# Patient Record
Sex: MALE | Race: WHITE | NOT HISPANIC OR LATINO | Employment: OTHER | URBAN - METROPOLITAN AREA
[De-identification: names, ages, dates, MRNs, and addresses within clinical notes are randomized per-mention and may not be internally consistent; named-entity substitution may affect disease eponyms.]

---

## 2017-01-30 ENCOUNTER — TELEPHONE (OUTPATIENT)
Dept: NEUROLOGY | Facility: CLINIC | Age: 72
End: 2017-01-30

## 2017-02-10 ENCOUNTER — OFFICE VISIT (OUTPATIENT)
Dept: NEUROLOGY | Facility: CLINIC | Age: 72
End: 2017-02-10

## 2017-02-10 VITALS
HEART RATE: 83 BPM | DIASTOLIC BLOOD PRESSURE: 70 MMHG | SYSTOLIC BLOOD PRESSURE: 138 MMHG | OXYGEN SATURATION: 97 % | HEIGHT: 74 IN | BODY MASS INDEX: 27.21 KG/M2 | WEIGHT: 212 LBS

## 2017-02-10 DIAGNOSIS — G44.301 INTRACTABLE POST-TRAUMATIC HEADACHE, UNSPECIFIED CHRONICITY PATTERN: ICD-10-CM

## 2017-02-10 DIAGNOSIS — G40.219 PARTIAL SYMPTOMATIC EPILEPSY WITH COMPLEX PARTIAL SEIZURES, INTRACTABLE, WITHOUT STATUS EPILEPTICUS (HCC): ICD-10-CM

## 2017-02-10 PROBLEM — G44.309 POST-TRAUMATIC HEADACHE: Status: ACTIVE | Noted: 2017-02-10

## 2017-02-10 PROCEDURE — 99214 OFFICE O/P EST MOD 30 MIN: CPT | Performed by: PSYCHIATRY & NEUROLOGY

## 2017-02-10 RX ORDER — TOPIRAMATE 25 MG/1
25 CAPSULE, COATED PELLETS ORAL DAILY
COMMUNITY
End: 2017-06-16

## 2017-02-10 NOTE — PROGRESS NOTES
Subjective   Jason Arceo is a 71 y.o. male complex partial epilepsy came for follow-up appointment.    History of Present Illness he was accompanied by his wife and according to them, he did not have any seizures since last visit but complaining of worsening of episodes of smoking smell and having multiple episodes a day and complaint with the medication topiramate and denies any side effects. Patient had a fall twice and trips while walking and fell on the left side of the head with no loss of consciousness, jerking movements or seizure-like activity and since then patient complaining of headaches mainly on the left side of the head and pressure-like pain but not associated with nausea, photophobia or phonophobia and denies any blurred vision, double vision, weakness, tingling numbness, dizziness or balance problems when walking.  Denies any passing out spells or recent hospitalizations since last visit.  Denies any major depression or anxiety issues.    The following portions of the patient's history were reviewed and updated as appropriate: allergies, current medications, past family history, past medical history, past social history, past surgical history and problem list.    Review of Systems   Constitutional: Negative for chills, fatigue and fever.   HENT: Positive for hearing loss. Negative for tinnitus and trouble swallowing.    Eyes: Positive for pain and visual disturbance.   Respiratory: Negative for shortness of breath and wheezing.    Cardiovascular: Negative for chest pain and palpitations.   Gastrointestinal: Negative for constipation, diarrhea, nausea and vomiting.   Endocrine: Negative for cold intolerance and heat intolerance.   Genitourinary: Negative for decreased urine volume, difficulty urinating and urgency.   Musculoskeletal: Positive for gait problem (HAD 2 FALLS IN DEC.). Negative for back pain, neck pain and neck stiffness.   Skin: Negative for rash and wound.   Allergic/Immunologic:  Negative for environmental allergies and food allergies.   Neurological: Positive for headaches (PRESSURE). Negative for dizziness, weakness, light-headedness and numbness.   Hematological: Does not bruise/bleed easily.   Psychiatric/Behavioral: Negative for confusion, hallucinations and sleep disturbance.       Objective   Physical Exam   Vitals reviewed.    GENERAL EXAMINATION : Patient is well-developed, well-nourished, well-groomed, alert and approriate in no apparent distress.  HEENT: Normocephalic, normal funduscopic exam, no visible oral lesions.  NECK : Normal range of motion, no tenderness, no malalignment.  CARDIAC : Regular rate and rhythm, no murmurs.  CHEST : Clear to auscultation, bilateral.   No wheezing .  ABDOMEN : Soft, non tender and non distended. EXTREMITIES: No edema. SKIN : No rashes or lesions visible. MUSCULOSKELETAL : No muscle weakness or muscle pain. No joint pains. PSYCHIATRIC : Awake and follwoing verbal commands well.     NEUROLOGICAL EXAMINATION  :  Higher integrative functions : No aphasia or dysarthria.  CRANIAL NERVES : Cranial nerve II: Normal visual acuity and visual fields.  On funduscopic exam, discs are flat with sharp margins cranial nerves III, IV, VI: Extraocular movements are full without nystagmus; pupils are equal, round and reactive to light.  Cranial nerve V: Normal facial sensation and strength of muscles of mastication.  Cranial nerve: VII: Facial movements are symmetric.  No weakness.  Cranial nerve VIII: Auditory acuity is normal.  Cranial nerve IX, X: Symmetric palate movement.  Cranial nerve XI sternocleidomastoid and trapezius are normal.  Cranial nerve XII: Tongue in the midline with no atrophy or fasciculations.      MOTOR : Normal muscle strength and tone.  SENSATION : Normal to light touch, pinprick, vibration sensations intact and Romberg is negative.  MUSCLE STRETCH REFLEXES : Normal and symmetric in the upper extremities and lower extremities and the  plantar responses are flexor bilaterally. COORDINATION : Finger to nose test showed no dysmetria.  Rapid alternating movements are normal.   GAIT : Walks on heels, toes except for mild difficulty with tandem walk.      Assessment/Plan   Jason was seen today for seizures.    Diagnoses and all orders for this visit:    Partial symptomatic epilepsy with complex partial seizures, intractable, without status epilepticus  -     EEG (Hospital Performed); Future    Intractable post-traumatic headache, unspecified chronicity pattern  -     CT Head Without Contrast; Future    71-year-old right-handed white male with history of complex partial epilepsy and had a recent fall and complaining of headaches concerning for posttraumatic headache came for follow-up appointment.  On exam, no new focal deficits were seen except for mild difficulty with tandem walk.  Discussed with the patient and his wife regarding his signs and symptoms and ordered for CT head without contrast to evaluate for posttraumatic headaches.  Also ordered for 4 hour EEG to evaluate for these episodes of smoking smell.  Told him to continue topiramate 25 mg by mouth twice a day and discussed about side effects and will consider increasing the dose in future depending on EEG.  Discussed about occasional or use headaches and told him not to take over-the-counter medication as it can cause rebound headaches.  Discussed about seizure precautions and told him not to drive for 90 days after last seizure, no climbing ladders or cleaning gutters, no swimming while alone and no tub baths while alone.  Will follow-up in 4 months or earlier if problems arise.    Thank you for allowing me to participate in the care of the patient.  Please feel free to call for any questions at your convenience.    Yours sincerely,    Genevieve Joshi M.D

## 2017-03-02 ENCOUNTER — HOSPITAL ENCOUNTER (OUTPATIENT)
Dept: CT IMAGING | Facility: HOSPITAL | Age: 72
Discharge: HOME | End: 2017-03-02
Attending: PSYCHIATRY & NEUROLOGY

## 2017-03-02 ENCOUNTER — HOSPITAL ENCOUNTER (OUTPATIENT)
Dept: NEUROLOGY | Facility: HOSPITAL | Age: 72
Discharge: HOME | End: 2017-03-02
Attending: PSYCHIATRY & NEUROLOGY | Admitting: PSYCHIATRY & NEUROLOGY

## 2017-03-02 DIAGNOSIS — G40.219 PARTIAL SYMPTOMATIC EPILEPSY WITH COMPLEX PARTIAL SEIZURES, INTRACTABLE, WITHOUT STATUS EPILEPTICUS (HCC): ICD-10-CM

## 2017-03-02 DIAGNOSIS — G44.301 INTRACTABLE POST-TRAUMATIC HEADACHE, UNSPECIFIED CHRONICITY PATTERN: ICD-10-CM

## 2017-03-02 PROCEDURE — 95813 EEG EXTND MNTR 61-119 MIN: CPT | Performed by: PSYCHIATRY & NEUROLOGY

## 2017-03-02 PROCEDURE — 70450 CT HEAD/BRAIN W/O DYE: CPT

## 2017-03-02 PROCEDURE — 95813 EEG EXTND MNTR 61-119 MIN: CPT

## 2017-03-02 NOTE — PROCEDURES
DATE OF EVALUATION:  03/02/2017     TEST DURATION: 2 hours 43 minutes.     INDICATIONS: Epilepsy.     TECHNIQUE: This is a digitally recorded non-video monitored EEG study. The 10-20 placement electrode system was utilized for this test. Hyperventilation was not performed.     FINDINGS:   There was severe muscular artifact affecting all electrode locations at the onset of this recording. The resting heart rate is 70. With reduction of muscle artifact, there is electrode artifact prominent at T6. During drowsiness, there is a posterior dominant rhythm of about 6 Hz theta. With deeper stages of drowsiness, 14 Hz sleep spindles, vertex sharp waves and K-complexes are noted.  Within 30 minutes at the onset of the recording, the F7 electrode showed severe artifact. Muscular artifact continued. With further relaxation, the artifact resolved. Mostly sleep spindle activity was intermixed with theta as described earlier and these activities were also identified over the bilateral temporal head regions. During the prolonged drowsy/waking recording, focal slowing of the background was not noted.  Spikes and sharp waves were not seen. During wakefulness 8.5 to 9 Hz low amplitude rhythmic alpha could be seen intermittently over the bioccipital head regions. T6 artifact remained fairly constant throughout the recording. Muscle artifact also remained severe throughout this EEG.     INTERPRETATION: During drowsiness and light sleep, the background appeared normal.  During wakefulness, there was severe artifact, although the posterior rhythm appeared intact and within the normal limits.     EEG CODE: 41174      JEANINE Arreola:jeffrey  D:   03/02/2017 09:53:47  T:   03/02/2017 12:54:56  Job ID:   [id number]  Document ID:   [id number]  cc:

## 2017-03-07 ENCOUNTER — TELEPHONE (OUTPATIENT)
Dept: NEUROLOGY | Facility: CLINIC | Age: 72
End: 2017-03-07

## 2017-03-10 ENCOUNTER — TELEPHONE (OUTPATIENT)
Dept: NEUROLOGY | Facility: CLINIC | Age: 72
End: 2017-03-10

## 2017-03-13 ENCOUNTER — TELEPHONE (OUTPATIENT)
Dept: NEUROLOGY | Facility: CLINIC | Age: 72
End: 2017-03-13

## 2017-06-16 ENCOUNTER — OFFICE VISIT (OUTPATIENT)
Dept: NEUROLOGY | Facility: CLINIC | Age: 72
End: 2017-06-16

## 2017-06-16 VITALS
WEIGHT: 212 LBS | BODY MASS INDEX: 27.21 KG/M2 | OXYGEN SATURATION: 97 % | SYSTOLIC BLOOD PRESSURE: 134 MMHG | DIASTOLIC BLOOD PRESSURE: 72 MMHG | HEIGHT: 74 IN | HEART RATE: 62 BPM

## 2017-06-16 DIAGNOSIS — G40.209 PARTIAL SYMPTOMATIC EPILEPSY WITH COMPLEX PARTIAL SEIZURES, NOT INTRACTABLE, WITHOUT STATUS EPILEPTICUS (HCC): ICD-10-CM

## 2017-06-16 PROBLEM — G44.309 POST-TRAUMATIC HEADACHE: Status: RESOLVED | Noted: 2017-02-10 | Resolved: 2017-06-16

## 2017-06-16 PROCEDURE — 99212 OFFICE O/P EST SF 10 MIN: CPT | Performed by: PSYCHIATRY & NEUROLOGY

## 2017-06-16 RX ORDER — TOPIRAMATE 25 MG/1
TABLET ORAL
COMMUNITY
Start: 2017-05-04 | End: 2017-06-16 | Stop reason: SDUPTHER

## 2017-06-16 RX ORDER — POTASSIUM CHLORIDE 750 MG/1
TABLET, FILM COATED, EXTENDED RELEASE ORAL
COMMUNITY
Start: 2017-05-04 | End: 2018-12-18 | Stop reason: SDUPTHER

## 2017-06-16 RX ORDER — TOPIRAMATE 25 MG/1
25 TABLET ORAL 2 TIMES DAILY
Qty: 180 TABLET | Refills: 3 | Status: SHIPPED | OUTPATIENT
Start: 2017-06-16 | End: 2020-12-31

## 2017-06-16 RX ORDER — AZITHROMYCIN 250 MG/1
TABLET, FILM COATED ORAL
Refills: 0 | COMMUNITY
Start: 2017-05-18 | End: 2017-12-18

## 2017-06-16 NOTE — PROGRESS NOTES
Subjective   Jason Arceo is a 72 y.o. male with history of complex partial epilepsy and posttraumatic headaches came for follow-up appointment.    History of Present Illness   He was accompanied by his wife and according to them, he denies any seizures since last visit and denies any abnormal smell and complaint with the medication topiramate and denies any side effects.  Denies any headaches since the last visit and denies any blurred vision, double vision, weakness, tingling numbness, dizziness or balance problems when walking.  Denies any falls, passing out spells or recent hospitalizations since last visit except for hospitalized for pneumonia and had rib fractures.  Denies any major depression or anxiety issues.    The following portions of the patient's history were reviewed and updated as appropriate: allergies, current medications, past family history, past medical history, past social history, past surgical history and problem list.    Review of Systems   Constitutional: Positive for fatigue. Negative for chills and fever.   HENT: Negative for hearing loss, tinnitus and trouble swallowing.    Eyes: Negative for pain, redness and itching.   Respiratory: Negative for cough, shortness of breath and wheezing.    Cardiovascular: Negative for chest pain, palpitations and leg swelling.   Gastrointestinal: Negative for constipation, diarrhea, nausea and vomiting.   Endocrine: Negative for cold intolerance and heat intolerance.   Genitourinary: Negative for decreased urine volume, difficulty urinating, frequency and urgency.   Musculoskeletal: Negative for back pain, gait problem, neck pain and neck stiffness.   Skin: Negative for color change, rash and wound.   Allergic/Immunologic: Negative for environmental allergies and food allergies.   Neurological: Negative for dizziness, tremors, seizures, syncope, weakness, light-headedness, numbness and headaches.   Hematological: Negative for adenopathy. Does not  bruise/bleed easily.   Psychiatric/Behavioral: Negative for confusion, hallucinations and sleep disturbance. The patient is not nervous/anxious.        Objective   Physical Exam   Vitals reviewed.    GENERAL EXAMINATION : Patient is well-developed, well-nourished, well-groomed, alert and approriate in no apparent distress.  HEENT: Normocephalic, normal funduscopic exam, no visible oral lesions.  NECK : Normal range of motion, no tenderness, no malalignment.  CARDIAC : Regular rate and rhythm, no murmurs.  CHEST : Clear to auscultation, bilateral.   No wheezing .  ABDOMEN : Soft, non tender and non distended. EXTREMITIES: No edema. SKIN : No rashes or lesions visible. MUSCULOSKELETAL : No muscle weakness or muscle pain. No joint pains. PSYCHIATRIC : Awake and follwoing verbal commands well.     NEUROLOGICAL EXAMINATION  :  Higher integrative functions : No aphasia or dysarthria.  CRANIAL NERVES : Cranial nerve II: Normal visual acuity and visual fields.  On funduscopic exam, discs are flat with sharp margins cranial nerves III, IV, VI: Extraocular movements are full without nystagmus; pupils are equal, round and reactive to light.  Cranial nerve V: Normal facial sensation and strength of muscles of mastication.  Cranial nerve: VII: Facial movements are symmetric.  No weakness.  Cranial nerve VIII: Auditory acuity is normal.  Cranial nerve IX, X: Symmetric palate movement.  Cranial nerve XI sternocleidomastoid and trapezius are normal.  Cranial nerve XII: Tongue in the midline with no atrophy or fasciculations.      MOTOR : Normal muscle strength and tone.  SENSATION : Normal to light touch, pinprick, vibration sensations intact and Romberg is negative.  MUSCLE STRETCH REFLEXES : Normal and symmetric in the upper extremities and lower extremities and the plantar responses are flexor bilaterally. COORDINATION : Finger to nose test showed no dysmetria.  Rapid alternating movements are normal.   GAIT : Walks on heels,  toes, except for mild difficulty with tandem walk.    Assessment/Plan   Jason was seen today for seizures.    Diagnoses and all orders for this visit:    Partial symptomatic epilepsy with complex partial seizures, not intractable, without status epilepticus    Other orders  -     topiramate (TOPAMAX) 25 MG tablet; Take 1 tablet by mouth 2 (Two) Times a Day for 90 days.    72-year-old right-handed white male with history of complex partial epilepsy with posttraumatic headaches came for follow-up appointment.  On exam, no new focal deficits were seen except for mild difficulty with tandem walk.  Reviewed hospital records and CT head dated March 2017 and showed atrophy with encephalomalacia in the right frontal lobe and did not any acute abnormality and also reviewed 4 hour EEG and did not show any epileptic discharges and discussed with the patient and his wife regarding his signs and symptoms and told him to continue topiramate 25 mg by mouth twice a day and discussed about side effects.  Discussed about seizure precautions and told him not to drive for 90 days after last seizure, no climbing ladders or cleaning gutters, no swimming while alone and not a baths while alone.  Will follow-up in 6 months or earlier if problems arise.    Thank you for allowing me to participate in the care of the patient.  Please feel free to call for any questions at your convenience.    Yours sincerely,    Genevieve Joshi M.D

## 2017-12-18 ENCOUNTER — OFFICE VISIT (OUTPATIENT)
Dept: NEUROLOGY | Facility: CLINIC | Age: 72
End: 2017-12-18

## 2017-12-18 VITALS
HEIGHT: 74 IN | BODY MASS INDEX: 28.75 KG/M2 | SYSTOLIC BLOOD PRESSURE: 122 MMHG | WEIGHT: 224 LBS | HEART RATE: 60 BPM | OXYGEN SATURATION: 96 % | DIASTOLIC BLOOD PRESSURE: 68 MMHG

## 2017-12-18 DIAGNOSIS — G40.209 PARTIAL SYMPTOMATIC EPILEPSY WITH COMPLEX PARTIAL SEIZURES, NOT INTRACTABLE, WITHOUT STATUS EPILEPTICUS (HCC): Primary | ICD-10-CM

## 2017-12-18 DIAGNOSIS — G56.03 BILATERAL CARPAL TUNNEL SYNDROME: ICD-10-CM

## 2017-12-18 DIAGNOSIS — S06.9X5D CLOSED TRAUMATIC BRAIN INJURY, WITH LOSS OF CONSCIOUSNESS GREATER THAN 24 HOURS WITH RETURN TO PRE-EXISTING CONSCIOUS LEVEL, SUBSEQUENT ENCOUNTER: ICD-10-CM

## 2017-12-18 PROBLEM — S06.9XAA TRAUMATIC BRAIN INJURY, CLOSED: Status: ACTIVE | Noted: 2017-12-18

## 2017-12-18 PROCEDURE — 99214 OFFICE O/P EST MOD 30 MIN: CPT | Performed by: PSYCHIATRY & NEUROLOGY

## 2017-12-18 RX ORDER — TOPIRAMATE 25 MG/1
TABLET ORAL
COMMUNITY
Start: 2017-11-04 | End: 2021-01-01 | Stop reason: HOSPADM

## 2017-12-18 RX ORDER — PANTOPRAZOLE SODIUM 40 MG/1
TABLET, DELAYED RELEASE ORAL
COMMUNITY
Start: 2017-10-21 | End: 2021-01-01 | Stop reason: HOSPADM

## 2017-12-18 RX ORDER — LOSARTAN POTASSIUM 50 MG/1
TABLET ORAL
COMMUNITY
Start: 2017-10-21 | End: 2021-01-01 | Stop reason: HOSPADM

## 2017-12-18 RX ORDER — POTASSIUM CHLORIDE 750 MG/1
TABLET, EXTENDED RELEASE ORAL
COMMUNITY
Start: 2017-10-21 | End: 2017-12-18 | Stop reason: SDUPTHER

## 2017-12-18 RX ORDER — PREDNISONE 1 MG/1
TABLET ORAL
COMMUNITY
Start: 2017-10-26 | End: 2017-12-18

## 2017-12-18 RX ORDER — AMOXICILLIN 500 MG/1
CAPSULE ORAL
COMMUNITY
Start: 2017-10-13 | End: 2017-12-18

## 2017-12-18 RX ORDER — ROSUVASTATIN CALCIUM 5 MG/1
TABLET, COATED ORAL
Status: ON HOLD | COMMUNITY
Start: 2017-11-16 | End: 2020-12-26

## 2017-12-18 NOTE — PROGRESS NOTES
Subjective:     Patient ID: Jason Arceo is a 72 y.o. male.    History of Present Illness  The following portions of the patient's history were reviewed and updated as appropriate: allergies, current medications, past family history, past medical history, past social history, past surgical history and problem list.    [facility]- May 2010. At the shop, fell 8  Feet to concrete floor, struck right side of head.He was unconscious x 11 days, (blood clots in legs, then 59 d of rehab at [facility]. Couldn't walk or feed himself.  He returned to normal about a year. Deficits since then- ? Prior to injury hea  Added lists of numbers in his head easily, but this is no longer possible. Still farms, works on a tractor, and has no complaint of imbalance or motor deficit.       One seizure in  October 2010.  Also under a lot of stress at that time. Went to . Was previously on Vimpat but had allergy, and and also allergic to keppra.  SO--  On tpmx- 25 bid- seems to ameliorate all seizures and headache  No real headaches at this time.     Drives tractor, car.     EEG- 2017 normal    CT 2/10/17- old encephalomalacia, RF.    Other hx- DM oral agents; HTN- off and on x 25 yrs;  AMI x2 with CABG in 2006; hip replacement.     New diagnosis today-typical symptoms of carpal tunnel syndrome with bilateral numbness and tingling.  Has to shake his hands at night.  Bothers him while using the tractor.  Review of Systems   Constitutional: Negative for activity change, appetite change and fatigue.   HENT: Negative for ear pain, facial swelling and trouble swallowing.    Eyes: Positive for visual disturbance. Negative for photophobia and pain.   Respiratory: Negative for choking, chest tightness and shortness of breath.    Cardiovascular: Positive for leg swelling. Negative for chest pain and palpitations.   Gastrointestinal: Negative for abdominal pain, constipation and nausea.   Endocrine: Negative for polydipsia, polyphagia and polyuria.    Genitourinary: Negative for difficulty urinating, frequency and urgency.   Musculoskeletal: Positive for neck pain. Negative for back pain and gait problem.   Skin: Negative for color change, rash and wound.   Allergic/Immunologic: Negative for environmental allergies, food allergies and immunocompromised state.   Neurological: Negative for dizziness, tremors, seizures, syncope, facial asymmetry, speech difficulty, weakness, light-headedness, numbness and headaches.   Hematological: Negative for adenopathy. Bruises/bleeds easily.   Psychiatric/Behavioral: Negative for agitation, behavioral problems, confusion, decreased concentration, dysphoric mood, hallucinations, self-injury, sleep disturbance and suicidal ideas. The patient is not nervous/anxious and is not hyperactive.         Objective:    Neurologic Exam  This patient was well-developed, well-nourished and in no acute distress.      GAIT:  Gait, station, heel, toe and tandem walk were normal.  There was no drift of the arms.  Romberg’s sign was not present.      VASCULAR: The carotid arteries had normal upstroke to palpation without bruits.     MENTAL STATUS: This patient was alert and oriented to person, place, time and situation. Fund of knowledge was intact.    CRANIAL NERVES:  Olfaction-not tested.  Visual fields full in all quadrants to confrontation.  The pupils were equally round and reactive to light at 4 mm.  There was no evidence of a Braxton Parvez pupil.  Funduscopic exam revealed no papilledema, hemorrhage or exudate.  The gaze was conjugate. The vertical and horizontal eye movements were full without nystagmus.  There was no facial weakness.  There was no facial sensory loss to pinprick bilaterally.  Hearing was normal for light finger rub and casual speech.  Speech is normal without dysarthria.  The neck is supple and strength is normal in rotation, flexion and extension.  Tongue and palate movements are normal.    MOTOR UPPER EXTREMTIES:    Bilaterally the deltoid, biceps, triceps, wrist extensorsgrade 5 and normal.  Bulk, tone and strength of these muscles were normal without abnormal movements.    Bilaterally the APB strength was grade 4 worse on the right than the left with bilateral atrophy also worse on the right.    MOTOR LOWER EXTREMITIES: Bilaterally the hip flexors, hip abductors, knee flexors and extensors, ankle plantar flexors and dorsiflexors were grade 5 with normal. Bulk, tone and strength of these muscles were normal without abnormal movements    DEEP TENDON REFLEXES:  Bilateral biceps, triceps, and brachioradialis reflexes were trace symmetric.  Bilateral knee, hamstrings and ankle reflexes were trace and symmetric.   Ankle clonus was not present.    CEREBELLAR:  Finger to nose, rapid finger opposition, and gait were performed normally, bilaterally.    MUSCULOSKELETAL:  Normal range of motion of the neck is noted.  There was no back pain with straight leg raise.  l.     SENSORY: There was normal upper and lower extremity sensation to vibration, and pinprick with the following exception.  Markedly decreased sensation to pinprick over the volar aspect of the index and long fingers down to the level of the MP joint.  Normal pinprick sensation in ulnar and radial distribution      Speech-slight hesitance but intact and fluent  Physical Exam   Constitutional: He appears well-developed and well-nourished.   Neck: Normal range of motion. Neck supple.   Cardiovascular: Normal rate.    Pulmonary/Chest: Effort normal.   Musculoskeletal:   Bilateral thenar atrophy.  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   Nursing note and vitals reviewed.      Assessment/Plan:       Problems Addressed this Visit        Unprioritized    Complex partial epilepsy - Primary    Relevant Medications    topiramate (TOPAMAX) 25 MG tablet    Traumatic brain injury, closed    Bilateral carpal tunnel syndrome    Relevant  Orders    EMG & Nerve Conduction Test           Brain injury by CT- frontal.  This is stable.    Seizure disorder-a single seizure.  None since then.  Stable on very low-dose Topamax.  No renal stones and no adverse effects on this drug.  Weight is stable.  Renal stone risk  discussed      Bilateral carpal tunnel syndrome.  This is severe.  Worse on the right.  I will refer for EMG nerve conduction studies.  Diabetes is a mitigating factor as is his work

## 2017-12-28 ENCOUNTER — HOSPITAL ENCOUNTER (OUTPATIENT)
Dept: INFUSION THERAPY | Facility: HOSPITAL | Age: 72
Discharge: HOME | End: 2017-12-28
Attending: PSYCHIATRY & NEUROLOGY | Admitting: PSYCHIATRY & NEUROLOGY

## 2017-12-28 DIAGNOSIS — G56.03 BILATERAL CARPAL TUNNEL SYNDROME: ICD-10-CM

## 2017-12-28 PROCEDURE — 95886 MUSC TEST DONE W/N TEST COMP: CPT

## 2017-12-28 PROCEDURE — 95911 NRV CNDJ TEST 9-10 STUDIES: CPT | Performed by: PSYCHIATRY & NEUROLOGY

## 2017-12-28 PROCEDURE — 95886 MUSC TEST DONE W/N TEST COMP: CPT | Performed by: PSYCHIATRY & NEUROLOGY

## 2017-12-28 PROCEDURE — 95911 NRV CNDJ TEST 9-10 STUDIES: CPT

## 2017-12-28 PROCEDURE — 95910 NRV CNDJ TEST 7-8 STUDIES: CPT

## 2018-09-04 ENCOUNTER — AMBULATORY SURGICAL CENTER (AMBULATORY)
Dept: URBAN - METROPOLITAN AREA SURGERY 17 | Facility: SURGERY | Age: 73
End: 2018-09-04

## 2018-09-04 VITALS
SYSTOLIC BLOOD PRESSURE: 90 MMHG | SYSTOLIC BLOOD PRESSURE: 123 MMHG | WEIGHT: 195 LBS | HEART RATE: 49 BPM | DIASTOLIC BLOOD PRESSURE: 69 MMHG | SYSTOLIC BLOOD PRESSURE: 95 MMHG | DIASTOLIC BLOOD PRESSURE: 43 MMHG | RESPIRATION RATE: 26 BRPM | OXYGEN SATURATION: 88 % | RESPIRATION RATE: 17 BRPM | DIASTOLIC BLOOD PRESSURE: 70 MMHG | SYSTOLIC BLOOD PRESSURE: 134 MMHG | DIASTOLIC BLOOD PRESSURE: 62 MMHG | RESPIRATION RATE: 25 BRPM | SYSTOLIC BLOOD PRESSURE: 83 MMHG | HEART RATE: 58 BPM | DIASTOLIC BLOOD PRESSURE: 42 MMHG | SYSTOLIC BLOOD PRESSURE: 85 MMHG | HEART RATE: 57 BPM | SYSTOLIC BLOOD PRESSURE: 74 MMHG | HEART RATE: 50 BPM | DIASTOLIC BLOOD PRESSURE: 66 MMHG | OXYGEN SATURATION: 100 % | SYSTOLIC BLOOD PRESSURE: 118 MMHG | DIASTOLIC BLOOD PRESSURE: 56 MMHG | DIASTOLIC BLOOD PRESSURE: 54 MMHG | TEMPERATURE: 97 F | SYSTOLIC BLOOD PRESSURE: 73 MMHG | DIASTOLIC BLOOD PRESSURE: 38 MMHG | DIASTOLIC BLOOD PRESSURE: 55 MMHG | HEART RATE: 55 BPM | OXYGEN SATURATION: 96 % | RESPIRATION RATE: 18 BRPM | RESPIRATION RATE: 20 BRPM | DIASTOLIC BLOOD PRESSURE: 61 MMHG | DIASTOLIC BLOOD PRESSURE: 60 MMHG | DIASTOLIC BLOOD PRESSURE: 35 MMHG | OXYGEN SATURATION: 98 % | SYSTOLIC BLOOD PRESSURE: 128 MMHG | HEIGHT: 75 IN | HEART RATE: 51 BPM | HEART RATE: 54 BPM | TEMPERATURE: 96.8 F | OXYGEN SATURATION: 99 % | RESPIRATION RATE: 19 BRPM | SYSTOLIC BLOOD PRESSURE: 80 MMHG | HEART RATE: 53 BPM | SYSTOLIC BLOOD PRESSURE: 94 MMHG | HEART RATE: 45 BPM | DIASTOLIC BLOOD PRESSURE: 63 MMHG | RESPIRATION RATE: 16 BRPM | HEART RATE: 52 BPM

## 2018-09-04 DIAGNOSIS — K57.30 DIVERTICULOSIS OF LARGE INTESTINE WITHOUT PERFORATION OR ABS: ICD-10-CM

## 2018-09-04 DIAGNOSIS — K64.8 OTHER HEMORRHOIDS: ICD-10-CM

## 2018-09-04 DIAGNOSIS — Z86.010 PERSONAL HISTORY OF COLONIC POLYPS: ICD-10-CM

## 2018-09-04 PROCEDURE — G0105 COLORECTAL SCRN; HI RISK IND: HCPCS

## 2018-12-18 ENCOUNTER — OFFICE VISIT (OUTPATIENT)
Dept: NEUROLOGY | Facility: CLINIC | Age: 73
End: 2018-12-18

## 2018-12-18 VITALS
WEIGHT: 206 LBS | HEART RATE: 60 BPM | OXYGEN SATURATION: 98 % | BODY MASS INDEX: 26.44 KG/M2 | SYSTOLIC BLOOD PRESSURE: 152 MMHG | HEIGHT: 74 IN | DIASTOLIC BLOOD PRESSURE: 70 MMHG

## 2018-12-18 DIAGNOSIS — G40.209 PARTIAL SYMPTOMATIC EPILEPSY WITH COMPLEX PARTIAL SEIZURES, NOT INTRACTABLE, WITHOUT STATUS EPILEPTICUS (HCC): Primary | ICD-10-CM

## 2018-12-18 DIAGNOSIS — M65.311 TRIGGER THUMB OF RIGHT HAND: ICD-10-CM

## 2018-12-18 DIAGNOSIS — G56.03 BILATERAL CARPAL TUNNEL SYNDROME: ICD-10-CM

## 2018-12-18 DIAGNOSIS — M19.049 CMC ARTHRITIS: ICD-10-CM

## 2018-12-18 DIAGNOSIS — R43.9 DYSOSMIA: ICD-10-CM

## 2018-12-18 PROCEDURE — 99214 OFFICE O/P EST MOD 30 MIN: CPT | Performed by: PSYCHIATRY & NEUROLOGY

## 2018-12-18 RX ORDER — POTASSIUM CHLORIDE 750 MG/1
10 TABLET, EXTENDED RELEASE ORAL EVERY MORNING
COMMUNITY

## 2018-12-18 RX ORDER — DEXLANSOPRAZOLE 60 MG/1
60 CAPSULE, DELAYED RELEASE ORAL DAILY
COMMUNITY
End: 2021-10-11

## 2018-12-18 RX ORDER — SENNA PLUS 8.6 MG/1
TABLET ORAL
Status: ON HOLD | COMMUNITY
End: 2020-12-26

## 2018-12-18 RX ORDER — AMOXICILLIN 500 MG/1
CAPSULE ORAL
Refills: 0 | COMMUNITY
Start: 2018-11-26 | End: 2021-06-13

## 2018-12-18 RX ORDER — FOLIC ACID 1 MG/1
1000 TABLET ORAL DAILY
Refills: 11 | COMMUNITY
Start: 2018-10-22

## 2018-12-18 NOTE — PROGRESS NOTES
Subjective:     Patient ID: Jason Arceo is a 73 y.o. male.    History of Present Illness  The following portions of the patient's history were reviewed and updated as appropriate: allergies, current medications, past family history, past medical history, past social history, past surgical history and problem list.    SEIZURE DISORDER-     [facility]- May 2010. At the shop, fell 8  Feet to concrete floor, struck right side of head.He was unconscious x 11 days, (blood clots in legs, then 59 d of rehab at [facility]. Couldn't walk or feed himself.  He returned to normal about a year LATER.     One seizure in  October 2010.  Also under a lot of stress at that time. Went to . Was previously on Vimpat but had allergy, and and also allergic to keppra. Now on low-dose topiramate.    On tpmx- 25 bid- seems to ameliorate all seizures and headache  No real headaches at this time.          Deficits :Added lists of numbers in his head easily, but this is no longer possible. Still farms, works on a tractor, and has no complaint of imbalance or motor deficit. 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 Driving w/o difficulty     LAB REVIEW-      EEG- 2017 normal     CT 2/10/17- old encephalomalacia, RF.     Other hx- DM oral agents; HTN- off and on x 25 yrs;  AMI x2 with CABG in 2006; hip replacement.     He has new sx of dysosmia- for the last month gets a' smell of tobacco' and this smells 'bad, but all other odors are fine. This is almost daily occurrence. ]No seizures.] It lasts hours. Unlikely to be a seizure due to daily nature, lasting hours.  He has had some bronchitis sx, spitting up dark color mucus f same duration as the dysosmia , about 3 weeks.      Carpal tunnel syndrome:  will perform surgery on the right hand which was successful.  Patient notes some trigger finger problems involving the right thumb and some arthritis at the base of the thumb.  .  Review of Systems   Constitutional: Positive for appetite change.  Negative for activity change and fatigue.   HENT: Positive for trouble swallowing. Negative for ear pain and facial swelling.    Eyes: Negative for photophobia, pain and visual disturbance.   Respiratory: Negative for choking, chest tightness and shortness of breath.    Cardiovascular: Negative for chest pain, palpitations and leg swelling.   Gastrointestinal: Positive for abdominal pain, constipation and nausea.   Endocrine: Negative for polydipsia, polyphagia and polyuria.   Genitourinary: Negative for difficulty urinating, frequency and urgency.   Musculoskeletal: Positive for back pain. Negative for gait problem and neck pain.   Skin: Negative for color change, rash and wound.   Allergic/Immunologic: Negative for environmental allergies, food allergies and immunocompromised state.   Neurological: Positive for headaches (feels like he has pressure just nehind his temples on both side). Negative for dizziness, tremors, seizures, syncope, facial asymmetry, speech difficulty, weakness, light-headedness and numbness.   Hematological: Negative for adenopathy. Does not bruise/bleed easily.   Psychiatric/Behavioral: Positive for confusion and decreased concentration. Negative for agitation, behavioral problems, dysphoric mood, hallucinations, self-injury, sleep disturbance and suicidal ideas. The patient is not nervous/anxious and is not hyperactive.         Objective:    Neurologic Exam     Mental Status   Oriented to person, place, and time.   Attention: normal. Concentration: normal.   Speech: speech is normal   Level of consciousness: alert  Knowledge: good. Unable to perform simple calculations.   Able to name object. Able to read.     Cranial Nerves     CN II   Visual fields full to confrontation.     CN III, IV, VI   Pupils are equal, round, and reactive to light.  Extraocular motions are normal.   Right pupil: Size: 4 mm.   Left pupil: Size: 4 mm.     CN V   Facial sensation intact.     CN VII   Facial  expression full, symmetric.     CN VIII   Hearing: intact    CN XI   CN XI normal.     CN XII   CN XII normal.   Normal fundi     Motor Exam Grade 5 strength throughout.  Intrinsic hand muscles graded 5.  However APB is atrophied right worse than left with about grade 4 strength on the right.  No fasciculations.     Sensory Exam   Light touch normal.     Gait, Coordination, and Reflexes     Gait  Gait: normal    Tremor   Resting tremor: absent  Intention tremor: absent  Action tremor: absent    Reflexes   Right triceps: 1+  Left triceps: 1+With the eyes closed he sways slightly but does not lose his balance.       Physical Exam   Constitutional: He is oriented to person, place, and time. He appears well-developed and well-nourished.   Eyes: EOM are normal. Pupils are equal, round, and reactive to light.   Neck: Normal range of motion. Neck supple.   Cardiovascular: Normal rate.   Pulmonary/Chest: Effort normal.   Musculoskeletal:   Arthritic deformities of the PIP and DIP joints bilaterally in the hands.  Finkelstein's maneuver negative on the right.  CMC grind test positive on the right.  Mild catching of the right thumb consistent with trigger thumb.   Neurological: He is oriented to person, place, and time. Gait normal.   Reflex Scores:       Tricep reflexes are 1+ on the right side and 1+ on the left side.  Psychiatric: He has a normal mood and affect. His speech is normal.   Nursing note and vitals reviewed.      Assessment/Plan:       Problems Addressed this Visit        Unprioritized    Complex partial epilepsy (CMS/HCC) - Primary    Bilateral carpal tunnel syndrome    Dysosmia    CMC arthritis    Trigger thumb of right hand           epilepsy is stable and he can remain on low-dose topiramate indefinitely.  Due to his abnormal brain anatomy with injury I would recommend that he remain on low-dose seizure medicine.      His hand problems are going to be readdressed by Dr. Cardoso.  He may need a steroid  injection for a diagnosis of trigger thumb and 4 a diagnosis of CMC arthritis.    Dysosmia is likely due to a recent upper respiratory infection, possibly sinusitis or bronchitis.  He will discuss this with his PCP.  I showed him an anatomical drawing of the olfactory nerve in the mucosa and noted that this is how infections can cause dysosmia    Follow-up will be when necessary

## 2019-02-11 ENCOUNTER — HOSPITAL ENCOUNTER (OUTPATIENT)
Dept: OTHER | Facility: HOSPITAL | Age: 74
Discharge: HOME | End: 2019-02-11
Attending: FAMILY MEDICINE

## 2019-07-30 ENCOUNTER — HOSPITAL ENCOUNTER (OUTPATIENT)
Dept: OTHER | Facility: HOSPITAL | Age: 74
Discharge: HOME | End: 2019-07-30
Attending: FAMILY MEDICINE

## 2019-10-27 VITALS
SYSTOLIC BLOOD PRESSURE: 112 MMHG | HEART RATE: 64 BPM | WEIGHT: 211 LBS | DIASTOLIC BLOOD PRESSURE: 62 MMHG | HEIGHT: 75 IN

## 2019-10-27 PROBLEM — Z86.010 PERSONAL HISTORY OF COLONIC POLYPS: Status: ACTIVE | Noted: 2018-09-04

## 2019-10-27 PROBLEM — K57.30 DVRTCLOS OF LG INT W/O PERFORATION OR ABSCESS W/O BLEEDING: Status: ACTIVE | Noted: 2018-09-04

## 2019-11-06 ENCOUNTER — OFFICE (AMBULATORY)
Dept: URBAN - METROPOLITAN AREA CLINIC 75 | Facility: CLINIC | Age: 74
End: 2019-11-06

## 2019-11-06 DIAGNOSIS — K64.8 OTHER HEMORRHOIDS: ICD-10-CM

## 2019-11-06 DIAGNOSIS — Z86.010 PERSONAL HISTORY OF COLONIC POLYPS: ICD-10-CM

## 2019-11-06 DIAGNOSIS — K57.30 DIVERTICULOSIS OF LARGE INTESTINE WITHOUT PERFORATION OR ABS: ICD-10-CM

## 2019-11-06 PROCEDURE — 99213 OFFICE O/P EST LOW 20 MIN: CPT

## 2019-11-25 ENCOUNTER — TELEPHONE (OUTPATIENT)
Dept: NEUROLOGY | Facility: CLINIC | Age: 74
End: 2019-11-25

## 2019-11-25 NOTE — TELEPHONE ENCOUNTER
I called and spoke to Mrs. Arceo and she stated they will call back when he decides what he is going to do.    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 Wife is wanting to know if patient needs to make an appointment for paperwork to be signed.

## 2019-12-05 ENCOUNTER — OFFICE (AMBULATORY)
Dept: URBAN - METROPOLITAN AREA CLINIC 75 | Facility: CLINIC | Age: 74
End: 2019-12-05

## 2019-12-05 VITALS — HEIGHT: 75 IN

## 2019-12-05 DIAGNOSIS — K64.1 SECOND DEGREE HEMORRHOIDS: ICD-10-CM

## 2019-12-05 PROBLEM — K62.5 RECTAL BLEEDING: Status: ACTIVE | Noted: 2019-12-05

## 2019-12-05 PROCEDURE — 46221 LIGATION OF HEMORRHOID(S): CPT | Performed by: INTERNAL MEDICINE

## 2019-12-05 NOTE — SERVICEHPINOTES
The patient presents with symptomatic grade   2  internal hemorrhoids , unresponsive to maximal medical therapy, requesting rubber band ligation of   his   hemorrhoidal disease. Xarelto has been held by his cardiologist due to significant rectal bleeding. He is on aspirin. Blood drips into the toilet. He was planned to have flex sig with banding by Dr. Ramirez but due to an emergency this needed to be rescheduled and he presents here for in office hemorrhoid banding today.We discussed that we would start the banding series today, and he could follow up with Dr. Ramirez. If bleeding persists, I would advise he go ahead with flex sig to ensure no lesions more proximal (polyp, mass, cancer, proctitis, diverticulosis) as etiology of bleeding and he and his wife agree.

## 2019-12-19 ENCOUNTER — OFFICE (AMBULATORY)
Dept: URBAN - METROPOLITAN AREA CLINIC 75 | Facility: CLINIC | Age: 74
End: 2019-12-19

## 2019-12-19 VITALS — HEIGHT: 75 IN

## 2019-12-19 DIAGNOSIS — K62.5 HEMORRHAGE OF ANUS AND RECTUM: ICD-10-CM

## 2019-12-19 DIAGNOSIS — K64.8 OTHER HEMORRHOIDS: ICD-10-CM

## 2019-12-19 DIAGNOSIS — K64.1 SECOND DEGREE HEMORRHOIDS: ICD-10-CM

## 2019-12-19 PROCEDURE — 46221 LIGATION OF HEMORRHOID(S): CPT | Performed by: INTERNAL MEDICINE

## 2019-12-19 NOTE — SERVICEHPINOTES
The patient presents with symptomatic grade 2 internal hemorrhoids , unresponsive to maximal medical therapy, requesting rubber band ligation of his hemorrhoidal disease. Xarelto has been held by his cardiologist due to significant rectal bleeding. He is on aspirin. Blood drips into the toilet. Plan is for flex sig with Dr. Ramirez if bleeding persists after banding session. BR

## 2020-01-15 ENCOUNTER — OFFICE (AMBULATORY)
Dept: URBAN - METROPOLITAN AREA CLINIC 75 | Facility: CLINIC | Age: 75
End: 2020-01-15

## 2020-01-15 VITALS — HEIGHT: 75 IN

## 2020-01-15 DIAGNOSIS — K64.1 SECOND DEGREE HEMORRHOIDS: ICD-10-CM

## 2020-01-15 PROBLEM — K64.8 OTHER HEMORRHOIDS: Status: ACTIVE | Noted: 2018-09-04

## 2020-01-15 PROCEDURE — 46221 LIGATION OF HEMORRHOID(S): CPT | Performed by: INTERNAL MEDICINE

## 2020-01-15 NOTE — SERVICEHPINOTES
The patient presents with symptomatic grade 2 internal hemorrhoids , unresponsive to maximal medical therapy, requesting rubber band ligation of his hemorrhoidal disease. Xarelto has been held by his cardiologist due to significant rectal bleeding. He is on aspirin. Plan is for flex sig with Dr. Ramirez if bleeding persists after banding session.

## 2020-02-18 ENCOUNTER — HOSPITAL ENCOUNTER (OUTPATIENT)
Dept: OTHER | Facility: HOSPITAL | Age: 75
Discharge: HOME | End: 2020-02-18

## 2020-12-26 ENCOUNTER — HOSPITAL ENCOUNTER (INPATIENT)
Facility: HOSPITAL | Age: 75
LOS: 6 days | Discharge: HOME HEALTH | End: 2021-01-01
Attending: ORTHOPAEDIC SURGERY | Admitting: INTERNAL MEDICINE

## 2020-12-26 DIAGNOSIS — S72.001A CLOSED FRACTURE OF RIGHT HIP, INITIAL ENCOUNTER (HCC): Primary | ICD-10-CM

## 2020-12-26 PROBLEM — E87.6 HYPOKALEMIA: Status: ACTIVE | Noted: 2020-12-26

## 2020-12-26 PROBLEM — G89.29 CHRONIC BACK PAIN: Status: ACTIVE | Noted: 2020-12-26

## 2020-12-26 PROBLEM — I48.91 ATRIAL FIBRILLATION AND FLUTTER (HCC): Status: ACTIVE | Noted: 2020-12-26

## 2020-12-26 PROBLEM — I48.92 ATRIAL FIBRILLATION AND FLUTTER: Status: ACTIVE | Noted: 2020-12-26

## 2020-12-26 PROBLEM — I74.5: Status: ACTIVE | Noted: 2020-12-26

## 2020-12-26 PROBLEM — S09.90XA CLOSED HEAD INJURY: Status: ACTIVE | Noted: 2020-12-26

## 2020-12-26 PROBLEM — N40.0 BPH (BENIGN PROSTATIC HYPERPLASIA): Status: ACTIVE | Noted: 2020-12-26

## 2020-12-26 PROBLEM — M54.9 CHRONIC BACK PAIN: Status: ACTIVE | Noted: 2020-12-26

## 2020-12-26 PROBLEM — K21.9 GASTROESOPHAGEAL REFLUX DISEASE: Status: ACTIVE | Noted: 2020-12-26

## 2020-12-26 PROBLEM — I25.10 ATHEROSCLEROSIS OF CORONARY ARTERY: Status: ACTIVE | Noted: 2020-12-26

## 2020-12-26 PROBLEM — I21.9 MYOCARDIAL INFARCTION: Status: ACTIVE | Noted: 2020-12-26

## 2020-12-26 PROBLEM — E11.9 TYPE 2 DIABETES MELLITUS: Status: ACTIVE | Noted: 2020-12-26

## 2020-12-26 PROBLEM — S72.009A HIP FRACTURE: Status: ACTIVE | Noted: 2020-12-26

## 2020-12-26 LAB
BASOPHILS # BLD AUTO: 0 10*3/MM3 (ref 0–0.2)
BASOPHILS NFR BLD AUTO: 0.4 % (ref 0–1.5)
DEPRECATED RDW RBC AUTO: 57.3 FL (ref 37–54)
EOSINOPHIL # BLD AUTO: 0 10*3/MM3 (ref 0–0.4)
EOSINOPHIL NFR BLD AUTO: 0.6 % (ref 0.3–6.2)
ERYTHROCYTE [DISTWIDTH] IN BLOOD BY AUTOMATED COUNT: 16.9 % (ref 12.3–15.4)
HCT VFR BLD AUTO: 30.6 % (ref 37.5–51)
HGB BLD-MCNC: 10.1 G/DL (ref 13–17.7)
LYMPHOCYTES # BLD AUTO: 0.6 10*3/MM3 (ref 0.7–3.1)
LYMPHOCYTES NFR BLD AUTO: 9 % (ref 19.6–45.3)
MCH RBC QN AUTO: 32.2 PG (ref 26.6–33)
MCHC RBC AUTO-ENTMCNC: 33 G/DL (ref 31.5–35.7)
MCV RBC AUTO: 97.7 FL (ref 79–97)
MONOCYTES # BLD AUTO: 0.5 10*3/MM3 (ref 0.1–0.9)
MONOCYTES NFR BLD AUTO: 8.7 % (ref 5–12)
NEUTROPHILS NFR BLD AUTO: 5.1 10*3/MM3 (ref 1.7–7)
NEUTROPHILS NFR BLD AUTO: 81.3 % (ref 42.7–76)
NRBC BLD AUTO-RTO: 0.1 /100 WBC (ref 0–0.2)
PLATELET # BLD AUTO: 179 10*3/MM3 (ref 140–450)
PMV BLD AUTO: 7.2 FL (ref 6–12)
RBC # BLD AUTO: 3.13 10*6/MM3 (ref 4.14–5.8)
SARS-COV-2 RNA PNL SPEC NAA+PROBE: NOT DETECTED
WBC # BLD AUTO: 6.3 10*3/MM3 (ref 3.4–10.8)

## 2020-12-26 PROCEDURE — 99222 1ST HOSP IP/OBS MODERATE 55: CPT | Performed by: STUDENT IN AN ORGANIZED HEALTH CARE EDUCATION/TRAINING PROGRAM

## 2020-12-26 PROCEDURE — U0003 INFECTIOUS AGENT DETECTION BY NUCLEIC ACID (DNA OR RNA); SEVERE ACUTE RESPIRATORY SYNDROME CORONAVIRUS 2 (SARS-COV-2) (CORONAVIRUS DISEASE [COVID-19]), AMPLIFIED PROBE TECHNIQUE, MAKING USE OF HIGH THROUGHPUT TECHNOLOGIES AS DESCRIBED BY CMS-2020-01-R: HCPCS | Performed by: ORTHOPAEDIC SURGERY

## 2020-12-26 PROCEDURE — 83036 HEMOGLOBIN GLYCOSYLATED A1C: CPT | Performed by: STUDENT IN AN ORGANIZED HEALTH CARE EDUCATION/TRAINING PROGRAM

## 2020-12-26 PROCEDURE — 85025 COMPLETE CBC W/AUTO DIFF WBC: CPT | Performed by: STUDENT IN AN ORGANIZED HEALTH CARE EDUCATION/TRAINING PROGRAM

## 2020-12-26 PROCEDURE — 93005 ELECTROCARDIOGRAM TRACING: CPT | Performed by: INTERNAL MEDICINE

## 2020-12-26 PROCEDURE — 80048 BASIC METABOLIC PNL TOTAL CA: CPT | Performed by: STUDENT IN AN ORGANIZED HEALTH CARE EDUCATION/TRAINING PROGRAM

## 2020-12-26 RX ORDER — SODIUM CHLORIDE 0.9 % (FLUSH) 0.9 %
10 SYRINGE (ML) INJECTION EVERY 12 HOURS SCHEDULED
Status: DISCONTINUED | OUTPATIENT
Start: 2020-12-26 | End: 2021-01-01 | Stop reason: HOSPADM

## 2020-12-26 RX ORDER — POTASSIUM CHLORIDE 7.45 MG/ML
10 INJECTION INTRAVENOUS
Status: DISCONTINUED | OUTPATIENT
Start: 2020-12-26 | End: 2021-01-01 | Stop reason: HOSPADM

## 2020-12-26 RX ORDER — POTASSIUM CHLORIDE 20 MEQ/1
40 TABLET, EXTENDED RELEASE ORAL AS NEEDED
Status: DISCONTINUED | OUTPATIENT
Start: 2020-12-26 | End: 2021-01-01 | Stop reason: HOSPADM

## 2020-12-26 RX ORDER — TAMSULOSIN HYDROCHLORIDE 0.4 MG/1
0.4 CAPSULE ORAL DAILY
Status: DISCONTINUED | OUTPATIENT
Start: 2020-12-27 | End: 2020-12-30

## 2020-12-26 RX ORDER — ACETAMINOPHEN 650 MG/1
650 SUPPOSITORY RECTAL EVERY 4 HOURS PRN
Status: DISCONTINUED | OUTPATIENT
Start: 2020-12-26 | End: 2021-01-01 | Stop reason: HOSPADM

## 2020-12-26 RX ORDER — CHOLECALCIFEROL (VITAMIN D3) 125 MCG
5 CAPSULE ORAL NIGHTLY PRN
Status: DISCONTINUED | OUTPATIENT
Start: 2020-12-26 | End: 2021-01-01 | Stop reason: HOSPADM

## 2020-12-26 RX ORDER — TOPIRAMATE 25 MG/1
50 TABLET ORAL DAILY
Status: DISCONTINUED | OUTPATIENT
Start: 2020-12-27 | End: 2021-01-01 | Stop reason: HOSPADM

## 2020-12-26 RX ORDER — ACETAMINOPHEN 325 MG/1
650 TABLET ORAL EVERY 4 HOURS PRN
Status: DISCONTINUED | OUTPATIENT
Start: 2020-12-26 | End: 2021-01-01 | Stop reason: HOSPADM

## 2020-12-26 RX ORDER — MAGNESIUM SULFATE HEPTAHYDRATE 40 MG/ML
2 INJECTION, SOLUTION INTRAVENOUS AS NEEDED
Status: DISCONTINUED | OUTPATIENT
Start: 2020-12-26 | End: 2021-01-01 | Stop reason: HOSPADM

## 2020-12-26 RX ORDER — ONDANSETRON 4 MG/1
4 TABLET, FILM COATED ORAL EVERY 6 HOURS PRN
Status: DISCONTINUED | OUTPATIENT
Start: 2020-12-26 | End: 2021-01-01 | Stop reason: HOSPADM

## 2020-12-26 RX ORDER — NITROGLYCERIN 0.4 MG/1
0.4 TABLET SUBLINGUAL
Status: DISCONTINUED | OUTPATIENT
Start: 2020-12-26 | End: 2021-01-01 | Stop reason: HOSPADM

## 2020-12-26 RX ORDER — INSULIN LISPRO 100 [IU]/ML
0-9 INJECTION, SOLUTION INTRAVENOUS; SUBCUTANEOUS AS NEEDED
Status: DISCONTINUED | OUTPATIENT
Start: 2020-12-26 | End: 2021-01-01 | Stop reason: HOSPADM

## 2020-12-26 RX ORDER — PANTOPRAZOLE SODIUM 40 MG/1
40 TABLET, DELAYED RELEASE ORAL
Status: DISCONTINUED | OUTPATIENT
Start: 2020-12-27 | End: 2021-01-01 | Stop reason: HOSPADM

## 2020-12-26 RX ORDER — NICOTINE POLACRILEX 4 MG
15 LOZENGE BUCCAL
Status: DISCONTINUED | OUTPATIENT
Start: 2020-12-26 | End: 2021-01-01 | Stop reason: HOSPADM

## 2020-12-26 RX ORDER — FENTANYL CITRATE 50 UG/ML
25 INJECTION, SOLUTION INTRAMUSCULAR; INTRAVENOUS EVERY 4 HOURS PRN
Status: DISCONTINUED | OUTPATIENT
Start: 2020-12-26 | End: 2021-01-01 | Stop reason: HOSPADM

## 2020-12-26 RX ORDER — INSULIN LISPRO 100 [IU]/ML
0-9 INJECTION, SOLUTION INTRAVENOUS; SUBCUTANEOUS
Status: DISCONTINUED | OUTPATIENT
Start: 2020-12-27 | End: 2021-01-01 | Stop reason: HOSPADM

## 2020-12-26 RX ORDER — ACETAMINOPHEN 160 MG/5ML
650 SOLUTION ORAL EVERY 4 HOURS PRN
Status: DISCONTINUED | OUTPATIENT
Start: 2020-12-26 | End: 2021-01-01 | Stop reason: HOSPADM

## 2020-12-26 RX ORDER — ONDANSETRON 2 MG/ML
4 INJECTION INTRAMUSCULAR; INTRAVENOUS EVERY 6 HOURS PRN
Status: DISCONTINUED | OUTPATIENT
Start: 2020-12-26 | End: 2021-01-01 | Stop reason: HOSPADM

## 2020-12-26 RX ORDER — FOLIC ACID 1 MG/1
1000 TABLET ORAL DAILY
Status: DISCONTINUED | OUTPATIENT
Start: 2020-12-27 | End: 2021-01-01 | Stop reason: HOSPADM

## 2020-12-26 RX ORDER — SODIUM CHLORIDE 0.9 % (FLUSH) 0.9 %
10 SYRINGE (ML) INJECTION AS NEEDED
Status: DISCONTINUED | OUTPATIENT
Start: 2020-12-26 | End: 2021-01-01 | Stop reason: HOSPADM

## 2020-12-26 RX ORDER — HYDROCODONE BITARTRATE AND ACETAMINOPHEN 5; 325 MG/1; MG/1
1 TABLET ORAL EVERY 6 HOURS PRN
Status: DISCONTINUED | OUTPATIENT
Start: 2020-12-26 | End: 2021-01-01 | Stop reason: HOSPADM

## 2020-12-26 RX ORDER — LOSARTAN POTASSIUM 50 MG/1
50 TABLET ORAL
Status: DISCONTINUED | OUTPATIENT
Start: 2020-12-27 | End: 2020-12-28

## 2020-12-26 RX ORDER — POTASSIUM CHLORIDE 1.5 G/1.77G
40 POWDER, FOR SOLUTION ORAL AS NEEDED
Status: DISCONTINUED | OUTPATIENT
Start: 2020-12-26 | End: 2021-01-01 | Stop reason: HOSPADM

## 2020-12-26 RX ORDER — MAGNESIUM SULFATE 1 G/100ML
1 INJECTION INTRAVENOUS AS NEEDED
Status: DISCONTINUED | OUTPATIENT
Start: 2020-12-26 | End: 2021-01-01 | Stop reason: HOSPADM

## 2020-12-26 RX ORDER — SODIUM CHLORIDE 9 MG/ML
100 INJECTION, SOLUTION INTRAVENOUS CONTINUOUS
Status: DISCONTINUED | OUTPATIENT
Start: 2020-12-27 | End: 2021-01-01 | Stop reason: HOSPADM

## 2020-12-26 RX ORDER — DEXTROSE MONOHYDRATE 25 G/50ML
25 INJECTION, SOLUTION INTRAVENOUS
Status: DISCONTINUED | OUTPATIENT
Start: 2020-12-26 | End: 2021-01-01 | Stop reason: HOSPADM

## 2020-12-26 RX ADMIN — Medication 10 ML: at 21:53

## 2020-12-26 RX ADMIN — SODIUM CHLORIDE 100 ML/HR: 9 INJECTION, SOLUTION INTRAVENOUS at 23:57

## 2020-12-27 ENCOUNTER — APPOINTMENT (OUTPATIENT)
Dept: GENERAL RADIOLOGY | Facility: HOSPITAL | Age: 75
End: 2020-12-27

## 2020-12-27 ENCOUNTER — ANESTHESIA (OUTPATIENT)
Dept: SURGERY | Facility: HOSPITAL | Age: 75
End: 2020-12-27

## 2020-12-27 ENCOUNTER — ANESTHESIA EVENT (OUTPATIENT)
Dept: SURGERY | Facility: HOSPITAL | Age: 75
End: 2020-12-27

## 2020-12-27 PROBLEM — E11.9 TYPE 2 DIABETES MELLITUS: Chronic | Status: ACTIVE | Noted: 2020-12-26

## 2020-12-27 PROBLEM — Z87.820 HISTORY OF TRAUMATIC BRAIN INJURY: Status: ACTIVE | Noted: 2017-12-18

## 2020-12-27 PROBLEM — S06.9XAA TRAUMATIC BRAIN INJURY, CLOSED: Chronic | Status: ACTIVE | Noted: 2017-12-18

## 2020-12-27 PROBLEM — Z87.820 HISTORY OF TRAUMATIC BRAIN INJURY: Chronic | Status: ACTIVE | Noted: 2017-12-18

## 2020-12-27 PROBLEM — Z86.718 HISTORY OF ARTERIAL THROMBOSIS: Status: ACTIVE | Noted: 2020-12-26

## 2020-12-27 PROBLEM — I48.0 PAROXYSMAL ATRIAL FIBRILLATION (HCC): Chronic | Status: ACTIVE | Noted: 2020-12-27

## 2020-12-27 PROBLEM — Z86.718 HISTORY OF ARTERIAL THROMBOSIS: Chronic | Status: ACTIVE | Noted: 2020-12-26

## 2020-12-27 PROBLEM — N40.0 BPH (BENIGN PROSTATIC HYPERPLASIA): Chronic | Status: ACTIVE | Noted: 2020-12-26

## 2020-12-27 PROBLEM — K21.9 GASTROESOPHAGEAL REFLUX DISEASE: Chronic | Status: ACTIVE | Noted: 2020-12-26

## 2020-12-27 LAB
ABO GROUP BLD: NORMAL
ANION GAP SERPL CALCULATED.3IONS-SCNC: 5 MMOL/L (ref 5–15)
ANION GAP SERPL CALCULATED.3IONS-SCNC: 6 MMOL/L (ref 5–15)
BASOPHILS # BLD AUTO: 0 10*3/MM3 (ref 0–0.2)
BASOPHILS NFR BLD AUTO: 0.5 % (ref 0–1.5)
BLD GP AB SCN SERPL QL: NEGATIVE
BUN SERPL-MCNC: 15 MG/DL (ref 8–23)
BUN SERPL-MCNC: 17 MG/DL (ref 8–23)
BUN/CREAT SERPL: 11 (ref 7–25)
BUN/CREAT SERPL: 12.8 (ref 7–25)
CALCIUM SPEC-SCNC: 8.6 MG/DL (ref 8.6–10.5)
CALCIUM SPEC-SCNC: 8.6 MG/DL (ref 8.6–10.5)
CHLORIDE SERPL-SCNC: 111 MMOL/L (ref 98–107)
CHLORIDE SERPL-SCNC: 111 MMOL/L (ref 98–107)
CO2 SERPL-SCNC: 24 MMOL/L (ref 22–29)
CO2 SERPL-SCNC: 25 MMOL/L (ref 22–29)
CREAT SERPL-MCNC: 1.33 MG/DL (ref 0.76–1.27)
CREAT SERPL-MCNC: 1.36 MG/DL (ref 0.76–1.27)
DEPRECATED RDW RBC AUTO: 57.8 FL (ref 37–54)
EOSINOPHIL # BLD AUTO: 0.1 10*3/MM3 (ref 0–0.4)
EOSINOPHIL NFR BLD AUTO: 1.2 % (ref 0.3–6.2)
ERYTHROCYTE [DISTWIDTH] IN BLOOD BY AUTOMATED COUNT: 17.1 % (ref 12.3–15.4)
GFR SERPL CREATININE-BSD FRML MDRD: 51 ML/MIN/1.73
GFR SERPL CREATININE-BSD FRML MDRD: 52 ML/MIN/1.73
GLUCOSE BLDC GLUCOMTR-MCNC: 105 MG/DL (ref 70–105)
GLUCOSE BLDC GLUCOMTR-MCNC: 105 MG/DL (ref 70–105)
GLUCOSE BLDC GLUCOMTR-MCNC: 107 MG/DL (ref 70–105)
GLUCOSE BLDC GLUCOMTR-MCNC: 88 MG/DL (ref 70–105)
GLUCOSE BLDC GLUCOMTR-MCNC: 91 MG/DL (ref 70–105)
GLUCOSE SERPL-MCNC: 113 MG/DL (ref 65–99)
GLUCOSE SERPL-MCNC: 131 MG/DL (ref 65–99)
HCT VFR BLD AUTO: 29.5 % (ref 37.5–51)
HGB BLD-MCNC: 9.8 G/DL (ref 13–17.7)
LYMPHOCYTES # BLD AUTO: 0.5 10*3/MM3 (ref 0.7–3.1)
LYMPHOCYTES NFR BLD AUTO: 9 % (ref 19.6–45.3)
MAGNESIUM SERPL-MCNC: 2.1 MG/DL (ref 1.6–2.4)
MCH RBC QN AUTO: 32.8 PG (ref 26.6–33)
MCHC RBC AUTO-ENTMCNC: 33.3 G/DL (ref 31.5–35.7)
MCV RBC AUTO: 98.5 FL (ref 79–97)
MONOCYTES # BLD AUTO: 0.7 10*3/MM3 (ref 0.1–0.9)
MONOCYTES NFR BLD AUTO: 11.5 % (ref 5–12)
NEUTROPHILS NFR BLD AUTO: 4.7 10*3/MM3 (ref 1.7–7)
NEUTROPHILS NFR BLD AUTO: 77.8 % (ref 42.7–76)
NRBC BLD AUTO-RTO: 0 /100 WBC (ref 0–0.2)
PLATELET # BLD AUTO: 169 10*3/MM3 (ref 140–450)
PMV BLD AUTO: 8.1 FL (ref 6–12)
POTASSIUM SERPL-SCNC: 4 MMOL/L (ref 3.5–5.2)
POTASSIUM SERPL-SCNC: 4 MMOL/L (ref 3.5–5.2)
RBC # BLD AUTO: 2.99 10*6/MM3 (ref 4.14–5.8)
RH BLD: POSITIVE
SODIUM SERPL-SCNC: 141 MMOL/L (ref 136–145)
SODIUM SERPL-SCNC: 141 MMOL/L (ref 136–145)
T&S EXPIRATION DATE: NORMAL
WBC # BLD AUTO: 6 10*3/MM3 (ref 3.4–10.8)

## 2020-12-27 PROCEDURE — 80048 BASIC METABOLIC PNL TOTAL CA: CPT | Performed by: STUDENT IN AN ORGANIZED HEALTH CARE EDUCATION/TRAINING PROGRAM

## 2020-12-27 PROCEDURE — 86900 BLOOD TYPING SEROLOGIC ABO: CPT | Performed by: STUDENT IN AN ORGANIZED HEALTH CARE EDUCATION/TRAINING PROGRAM

## 2020-12-27 PROCEDURE — 73502 X-RAY EXAM HIP UNI 2-3 VIEWS: CPT

## 2020-12-27 PROCEDURE — 86901 BLOOD TYPING SEROLOGIC RH(D): CPT | Performed by: STUDENT IN AN ORGANIZED HEALTH CARE EDUCATION/TRAINING PROGRAM

## 2020-12-27 PROCEDURE — 99232 SBSQ HOSP IP/OBS MODERATE 35: CPT | Performed by: INTERNAL MEDICINE

## 2020-12-27 PROCEDURE — 86900 BLOOD TYPING SEROLOGIC ABO: CPT

## 2020-12-27 PROCEDURE — 86850 RBC ANTIBODY SCREEN: CPT | Performed by: STUDENT IN AN ORGANIZED HEALTH CARE EDUCATION/TRAINING PROGRAM

## 2020-12-27 PROCEDURE — 82962 GLUCOSE BLOOD TEST: CPT

## 2020-12-27 PROCEDURE — 83735 ASSAY OF MAGNESIUM: CPT | Performed by: STUDENT IN AN ORGANIZED HEALTH CARE EDUCATION/TRAINING PROGRAM

## 2020-12-27 PROCEDURE — 86901 BLOOD TYPING SEROLOGIC RH(D): CPT

## 2020-12-27 PROCEDURE — 85025 COMPLETE CBC W/AUTO DIFF WBC: CPT | Performed by: STUDENT IN AN ORGANIZED HEALTH CARE EDUCATION/TRAINING PROGRAM

## 2020-12-27 PROCEDURE — 86923 COMPATIBILITY TEST ELECTRIC: CPT

## 2020-12-27 RX ORDER — POLYETHYLENE GLYCOL 3350 17 G/17G
17 POWDER, FOR SOLUTION ORAL DAILY
Status: DISCONTINUED | OUTPATIENT
Start: 2020-12-27 | End: 2021-01-01 | Stop reason: HOSPADM

## 2020-12-27 RX ADMIN — FOLIC ACID 1000 MCG: 1 TABLET ORAL at 10:00

## 2020-12-27 RX ADMIN — PANTOPRAZOLE SODIUM 40 MG: 40 TABLET, DELAYED RELEASE ORAL at 17:21

## 2020-12-27 RX ADMIN — POLYETHYLENE GLYCOL 3350 17 G: 17 POWDER, FOR SOLUTION ORAL at 21:52

## 2020-12-27 RX ADMIN — TOPIRAMATE 50 MG: 25 TABLET, FILM COATED ORAL at 10:00

## 2020-12-27 RX ADMIN — HYDROCODONE BITARTRATE AND ACETAMINOPHEN 1 TABLET: 5; 325 TABLET ORAL at 06:13

## 2020-12-27 RX ADMIN — LOSARTAN POTASSIUM 50 MG: 50 TABLET, FILM COATED ORAL at 10:00

## 2020-12-27 RX ADMIN — Medication 10 ML: at 10:01

## 2020-12-27 RX ADMIN — TAMSULOSIN HYDROCHLORIDE 0.4 MG: 0.4 CAPSULE ORAL at 10:00

## 2020-12-27 RX ADMIN — HYDROCODONE BITARTRATE AND ACETAMINOPHEN 1 TABLET: 5; 325 TABLET ORAL at 21:51

## 2020-12-27 RX ADMIN — Medication 10 ML: at 21:52

## 2020-12-28 ENCOUNTER — APPOINTMENT (OUTPATIENT)
Dept: CARDIOLOGY | Facility: HOSPITAL | Age: 75
End: 2020-12-28

## 2020-12-28 ENCOUNTER — APPOINTMENT (OUTPATIENT)
Dept: OTHER | Facility: HOSPITAL | Age: 75
End: 2020-12-28

## 2020-12-28 ENCOUNTER — ANESTHESIA EVENT (OUTPATIENT)
Dept: PERIOP | Facility: HOSPITAL | Age: 75
End: 2020-12-28

## 2020-12-28 DIAGNOSIS — Z09 FOLLOW UP: ICD-10-CM

## 2020-12-28 PROBLEM — S72.001A CLOSED FRACTURE OF RIGHT HIP (HCC): Status: ACTIVE | Noted: 2020-12-26

## 2020-12-28 LAB
ANION GAP SERPL CALCULATED.3IONS-SCNC: 7 MMOL/L (ref 5–15)
BASOPHILS # BLD AUTO: 0 10*3/MM3 (ref 0–0.2)
BASOPHILS NFR BLD AUTO: 0.5 % (ref 0–1.5)
BUN SERPL-MCNC: 15 MG/DL (ref 8–23)
BUN/CREAT SERPL: 11.4 (ref 7–25)
CALCIUM SPEC-SCNC: 8.6 MG/DL (ref 8.6–10.5)
CHLORIDE SERPL-SCNC: 109 MMOL/L (ref 98–107)
CO2 SERPL-SCNC: 24 MMOL/L (ref 22–29)
CREAT SERPL-MCNC: 1.32 MG/DL (ref 0.76–1.27)
DEPRECATED RDW RBC AUTO: 62.6 FL (ref 37–54)
EOSINOPHIL # BLD AUTO: 0.2 10*3/MM3 (ref 0–0.4)
EOSINOPHIL NFR BLD AUTO: 2.9 % (ref 0.3–6.2)
ERYTHROCYTE [DISTWIDTH] IN BLOOD BY AUTOMATED COUNT: 18.1 % (ref 12.3–15.4)
GFR SERPL CREATININE-BSD FRML MDRD: 53 ML/MIN/1.73
GLUCOSE BLDC GLUCOMTR-MCNC: 104 MG/DL (ref 70–105)
GLUCOSE BLDC GLUCOMTR-MCNC: 128 MG/DL (ref 70–105)
GLUCOSE BLDC GLUCOMTR-MCNC: 161 MG/DL (ref 70–105)
GLUCOSE BLDC GLUCOMTR-MCNC: 93 MG/DL (ref 70–105)
GLUCOSE SERPL-MCNC: 94 MG/DL (ref 65–99)
HBA1C MFR BLD: 5.9 % (ref 3.5–5.6)
HCT VFR BLD AUTO: 32.8 % (ref 37.5–51)
HGB BLD-MCNC: 10.7 G/DL (ref 13–17.7)
LYMPHOCYTES # BLD AUTO: 0.8 10*3/MM3 (ref 0.7–3.1)
LYMPHOCYTES NFR BLD AUTO: 12.9 % (ref 19.6–45.3)
MAGNESIUM SERPL-MCNC: 2.1 MG/DL (ref 1.6–2.4)
MCH RBC QN AUTO: 32.5 PG (ref 26.6–33)
MCHC RBC AUTO-ENTMCNC: 32.8 G/DL (ref 31.5–35.7)
MCV RBC AUTO: 99.1 FL (ref 79–97)
MONOCYTES # BLD AUTO: 0.9 10*3/MM3 (ref 0.1–0.9)
MONOCYTES NFR BLD AUTO: 15 % (ref 5–12)
NEUTROPHILS NFR BLD AUTO: 4 10*3/MM3 (ref 1.7–7)
NEUTROPHILS NFR BLD AUTO: 68.7 % (ref 42.7–76)
NRBC BLD AUTO-RTO: 0.1 /100 WBC (ref 0–0.2)
PLATELET # BLD AUTO: 159 10*3/MM3 (ref 140–450)
PMV BLD AUTO: 8.2 FL (ref 6–12)
POTASSIUM SERPL-SCNC: 4.1 MMOL/L (ref 3.5–5.2)
RBC # BLD AUTO: 3.31 10*6/MM3 (ref 4.14–5.8)
SODIUM SERPL-SCNC: 140 MMOL/L (ref 136–145)
WBC # BLD AUTO: 5.9 10*3/MM3 (ref 3.4–10.8)

## 2020-12-28 PROCEDURE — 82962 GLUCOSE BLOOD TEST: CPT

## 2020-12-28 PROCEDURE — 99233 SBSQ HOSP IP/OBS HIGH 50: CPT | Performed by: INTERNAL MEDICINE

## 2020-12-28 PROCEDURE — 83735 ASSAY OF MAGNESIUM: CPT | Performed by: STUDENT IN AN ORGANIZED HEALTH CARE EDUCATION/TRAINING PROGRAM

## 2020-12-28 PROCEDURE — 85025 COMPLETE CBC W/AUTO DIFF WBC: CPT | Performed by: STUDENT IN AN ORGANIZED HEALTH CARE EDUCATION/TRAINING PROGRAM

## 2020-12-28 PROCEDURE — 93306 TTE W/DOPPLER COMPLETE: CPT | Performed by: INTERNAL MEDICINE

## 2020-12-28 PROCEDURE — 93010 ELECTROCARDIOGRAM REPORT: CPT | Performed by: INTERNAL MEDICINE

## 2020-12-28 PROCEDURE — 93306 TTE W/DOPPLER COMPLETE: CPT

## 2020-12-28 PROCEDURE — 80048 BASIC METABOLIC PNL TOTAL CA: CPT | Performed by: STUDENT IN AN ORGANIZED HEALTH CARE EDUCATION/TRAINING PROGRAM

## 2020-12-28 PROCEDURE — 63710000001 INSULIN LISPRO (HUMAN) PER 5 UNITS: Performed by: STUDENT IN AN ORGANIZED HEALTH CARE EDUCATION/TRAINING PROGRAM

## 2020-12-28 PROCEDURE — 99222 1ST HOSP IP/OBS MODERATE 55: CPT | Performed by: INTERNAL MEDICINE

## 2020-12-28 RX ORDER — LOSARTAN POTASSIUM 25 MG/1
25 TABLET ORAL
Status: DISCONTINUED | OUTPATIENT
Start: 2020-12-29 | End: 2021-01-01 | Stop reason: HOSPADM

## 2020-12-28 RX ADMIN — INSULIN LISPRO 2 UNITS: 100 INJECTION, SOLUTION INTRAVENOUS; SUBCUTANEOUS at 17:19

## 2020-12-28 RX ADMIN — Medication 10 ML: at 09:03

## 2020-12-28 RX ADMIN — Medication 10 ML: at 21:19

## 2020-12-28 RX ADMIN — SODIUM CHLORIDE 100 ML/HR: 9 INJECTION, SOLUTION INTRAVENOUS at 09:31

## 2020-12-28 RX ADMIN — TAMSULOSIN HYDROCHLORIDE 0.4 MG: 0.4 CAPSULE ORAL at 09:03

## 2020-12-28 RX ADMIN — HYDROCODONE BITARTRATE AND ACETAMINOPHEN 1 TABLET: 5; 325 TABLET ORAL at 19:51

## 2020-12-28 RX ADMIN — FOLIC ACID 1000 MCG: 1 TABLET ORAL at 09:03

## 2020-12-28 RX ADMIN — PANTOPRAZOLE SODIUM 40 MG: 40 TABLET, DELAYED RELEASE ORAL at 07:27

## 2020-12-28 RX ADMIN — PANTOPRAZOLE SODIUM 40 MG: 40 TABLET, DELAYED RELEASE ORAL at 16:57

## 2020-12-28 RX ADMIN — TOPIRAMATE 50 MG: 25 TABLET, FILM COATED ORAL at 09:03

## 2020-12-29 ENCOUNTER — APPOINTMENT (OUTPATIENT)
Dept: GENERAL RADIOLOGY | Facility: HOSPITAL | Age: 75
End: 2020-12-29

## 2020-12-29 ENCOUNTER — ANESTHESIA (OUTPATIENT)
Dept: PERIOP | Facility: HOSPITAL | Age: 75
End: 2020-12-29

## 2020-12-29 LAB
ANION GAP SERPL CALCULATED.3IONS-SCNC: 7 MMOL/L (ref 5–15)
BASOPHILS # BLD AUTO: 0 10*3/MM3 (ref 0–0.2)
BASOPHILS NFR BLD AUTO: 0.5 % (ref 0–1.5)
BH BB BLOOD EXPIRATION DATE: NORMAL
BH BB BLOOD EXPIRATION DATE: NORMAL
BH BB BLOOD TYPE BARCODE: 5100
BH BB BLOOD TYPE BARCODE: 5100
BH BB DISPENSE STATUS: NORMAL
BH BB DISPENSE STATUS: NORMAL
BH BB PRODUCT CODE: NORMAL
BH BB PRODUCT CODE: NORMAL
BH BB UNIT NUMBER: NORMAL
BH BB UNIT NUMBER: NORMAL
BH CV ECHO MEAS - ACS: 2.2 CM
BH CV ECHO MEAS - AO MAX PG (FULL): 8 MMHG
BH CV ECHO MEAS - AO MAX PG: 11.4 MMHG
BH CV ECHO MEAS - AO MEAN PG (FULL): 4.4 MMHG
BH CV ECHO MEAS - AO MEAN PG: 6.3 MMHG
BH CV ECHO MEAS - AO ROOT AREA (BSA CORRECTED): 1.3
BH CV ECHO MEAS - AO ROOT AREA: 6.8 CM^2
BH CV ECHO MEAS - AO ROOT DIAM: 2.9 CM
BH CV ECHO MEAS - AO V2 MAX: 169.2 CM/SEC
BH CV ECHO MEAS - AO V2 MEAN: 118.7 CM/SEC
BH CV ECHO MEAS - AO V2 VTI: 37.9 CM
BH CV ECHO MEAS - AORTIC HR: 58.8 BPM
BH CV ECHO MEAS - AORTIC R-R: 1 SEC
BH CV ECHO MEAS - ASC AORTA: 3.5 CM
BH CV ECHO MEAS - AVA(I,A): 2.1 CM^2
BH CV ECHO MEAS - AVA(I,D): 2.1 CM^2
BH CV ECHO MEAS - AVA(V,A): 1.9 CM^2
BH CV ECHO MEAS - AVA(V,D): 1.9 CM^2
BH CV ECHO MEAS - BSA(HAYCOCK): 2.2 M^2
BH CV ECHO MEAS - BSA: 2.2 M^2
BH CV ECHO MEAS - BZI_BMI: 26.4 KILOGRAMS/M^2
BH CV ECHO MEAS - BZI_METRIC_HEIGHT: 188 CM
BH CV ECHO MEAS - BZI_METRIC_WEIGHT: 93.4 KG
BH CV ECHO MEAS - CI(AO): 6.9 L/MIN/M^2
BH CV ECHO MEAS - CI(LVOT): 2.1 L/MIN/M^2
BH CV ECHO MEAS - CO(AO): 15.1 L/MIN
BH CV ECHO MEAS - CO(LVOT): 4.7 L/MIN
BH CV ECHO MEAS - EDV(CUBED): 73.7 ML
BH CV ECHO MEAS - EDV(MOD-SP2): 92.3 ML
BH CV ECHO MEAS - EDV(MOD-SP4): 100 ML
BH CV ECHO MEAS - EDV(TEICH): 78.2 ML
BH CV ECHO MEAS - EF(CUBED): 50.6 %
BH CV ECHO MEAS - EF(MOD-BP): 57 %
BH CV ECHO MEAS - EF(MOD-SP2): 59.1 %
BH CV ECHO MEAS - EF(MOD-SP4): 56.1 %
BH CV ECHO MEAS - EF(TEICH): 43 %
BH CV ECHO MEAS - ESV(CUBED): 36.4 ML
BH CV ECHO MEAS - ESV(MOD-SP2): 37.7 ML
BH CV ECHO MEAS - ESV(MOD-SP4): 43.9 ML
BH CV ECHO MEAS - ESV(TEICH): 44.6 ML
BH CV ECHO MEAS - FS: 20.9 %
BH CV ECHO MEAS - IVS/LVPW: 0.8
BH CV ECHO MEAS - IVSD: 1.3 CM
BH CV ECHO MEAS - LA DIMENSION(2D): 4.5 CM
BH CV ECHO MEAS - LA DIMENSION: 4.8 CM
BH CV ECHO MEAS - LA/AO: 1.6
BH CV ECHO MEAS - LV DIASTOLIC VOL/BSA (35-75): 45.4 ML/M^2
BH CV ECHO MEAS - LV MASS(C)D: 253.6 GRAMS
BH CV ECHO MEAS - LV MASS(C)DI: 115.2 GRAMS/M^2
BH CV ECHO MEAS - LV MAX PG: 3.5 MMHG
BH CV ECHO MEAS - LV MEAN PG: 1.9 MMHG
BH CV ECHO MEAS - LV SYSTOLIC VOL/BSA (12-30): 19.9 ML/M^2
BH CV ECHO MEAS - LV V1 MAX: 93.4 CM/SEC
BH CV ECHO MEAS - LV V1 MEAN: 65.6 CM/SEC
BH CV ECHO MEAS - LV V1 VTI: 23.2 CM
BH CV ECHO MEAS - LVIDD: 4.2 CM
BH CV ECHO MEAS - LVIDS: 3.3 CM
BH CV ECHO MEAS - LVOT AREA: 3.4 CM^2
BH CV ECHO MEAS - LVOT DIAM: 2.1 CM
BH CV ECHO MEAS - LVPWD: 1.7 CM
BH CV ECHO MEAS - MR MAX PG: 88.1 MMHG
BH CV ECHO MEAS - MR MAX VEL: 469.3 CM/SEC
BH CV ECHO MEAS - MV A MAX VEL: 46.7 CM/SEC
BH CV ECHO MEAS - MV DEC SLOPE: 235.1 CM/SEC^2
BH CV ECHO MEAS - MV DEC TIME: 0.32 SEC
BH CV ECHO MEAS - MV E MAX VEL: 74.8 CM/SEC
BH CV ECHO MEAS - MV E/A: 1.6
BH CV ECHO MEAS - MV MAX PG: 3.6 MMHG
BH CV ECHO MEAS - MV MEAN PG: 1.1 MMHG
BH CV ECHO MEAS - MV V2 MAX: 95.1 CM/SEC
BH CV ECHO MEAS - MV V2 MEAN: 49.1 CM/SEC
BH CV ECHO MEAS - MV V2 VTI: 28.8 CM
BH CV ECHO MEAS - MVA(VTI): 2.8 CM^2
BH CV ECHO MEAS - PA ACC TIME: 0.14 SEC
BH CV ECHO MEAS - PA MAX PG (FULL): 0.9 MMHG
BH CV ECHO MEAS - PA MAX PG: 3.5 MMHG
BH CV ECHO MEAS - PA MEAN PG (FULL): 0.94 MMHG
BH CV ECHO MEAS - PA MEAN PG: 2.2 MMHG
BH CV ECHO MEAS - PA PR(ACCEL): 15.6 MMHG
BH CV ECHO MEAS - PA V2 MAX: 94 CM/SEC
BH CV ECHO MEAS - PA V2 MEAN: 71.5 CM/SEC
BH CV ECHO MEAS - PA V2 VTI: 23.1 CM
BH CV ECHO MEAS - PI END-D VEL: 130.8 CM/SEC
BH CV ECHO MEAS - PI MAX PG: 14.5 MMHG
BH CV ECHO MEAS - PI MAX VEL: 190.4 CM/SEC
BH CV ECHO MEAS - PULM A REVS DUR: 0.21 SEC
BH CV ECHO MEAS - PULM A REVS VEL: 33.1 CM/SEC
BH CV ECHO MEAS - PULM DIAS VEL: 63 CM/SEC
BH CV ECHO MEAS - PULM S/D: 0.83
BH CV ECHO MEAS - PULM SYS VEL: 52.6 CM/SEC
BH CV ECHO MEAS - PVA(I,A): 5.5 CM^2
BH CV ECHO MEAS - PVA(I,D): 5.5 CM^2
BH CV ECHO MEAS - PVA(V,A): 5.5 CM^2
BH CV ECHO MEAS - PVA(V,D): 5.5 CM^2
BH CV ECHO MEAS - QP/QS: 1.6
BH CV ECHO MEAS - RAP SYSTOLE: 8 MMHG
BH CV ECHO MEAS - RV MAX PG: 2.6 MMHG
BH CV ECHO MEAS - RV MEAN PG: 1.3 MMHG
BH CV ECHO MEAS - RV V1 MAX: 81.1 CM/SEC
BH CV ECHO MEAS - RV V1 MEAN: 52.3 CM/SEC
BH CV ECHO MEAS - RV V1 VTI: 19.9 CM
BH CV ECHO MEAS - RVDD: 2.4 CM
BH CV ECHO MEAS - RVOT AREA: 6.4 CM^2
BH CV ECHO MEAS - RVOT DIAM: 2.9 CM
BH CV ECHO MEAS - RVSP: 62.2 MMHG
BH CV ECHO MEAS - SI(AO): 116.7 ML/M^2
BH CV ECHO MEAS - SI(CUBED): 16.9 ML/M^2
BH CV ECHO MEAS - SI(LVOT): 36.3 ML/M^2
BH CV ECHO MEAS - SI(MOD-SP2): 24.8 ML/M^2
BH CV ECHO MEAS - SI(MOD-SP4): 25.5 ML/M^2
BH CV ECHO MEAS - SI(TEICH): 15.3 ML/M^2
BH CV ECHO MEAS - SV(AO): 256.7 ML
BH CV ECHO MEAS - SV(CUBED): 37.2 ML
BH CV ECHO MEAS - SV(LVOT): 79.9 ML
BH CV ECHO MEAS - SV(MOD-SP2): 54.6 ML
BH CV ECHO MEAS - SV(MOD-SP4): 56.1 ML
BH CV ECHO MEAS - SV(RVOT): 127.8 ML
BH CV ECHO MEAS - SV(TEICH): 33.6 ML
BH CV ECHO MEAS - TR MAX VEL: 367.8 CM/SEC
BUN SERPL-MCNC: 14 MG/DL (ref 8–23)
BUN/CREAT SERPL: 11.3 (ref 7–25)
CALCIUM SPEC-SCNC: 8.8 MG/DL (ref 8.6–10.5)
CHLORIDE SERPL-SCNC: 110 MMOL/L (ref 98–107)
CO2 SERPL-SCNC: 23 MMOL/L (ref 22–29)
CREAT SERPL-MCNC: 1.24 MG/DL (ref 0.76–1.27)
CROSSMATCH INTERPRETATION: NORMAL
CROSSMATCH INTERPRETATION: NORMAL
DEPRECATED RDW RBC AUTO: 57.8 FL (ref 37–54)
EOSINOPHIL # BLD AUTO: 0.2 10*3/MM3 (ref 0–0.4)
EOSINOPHIL NFR BLD AUTO: 2.5 % (ref 0.3–6.2)
ERYTHROCYTE [DISTWIDTH] IN BLOOD BY AUTOMATED COUNT: 17.1 % (ref 12.3–15.4)
GFR SERPL CREATININE-BSD FRML MDRD: 57 ML/MIN/1.73
GLUCOSE BLDC GLUCOMTR-MCNC: 103 MG/DL (ref 70–105)
GLUCOSE BLDC GLUCOMTR-MCNC: 109 MG/DL (ref 70–105)
GLUCOSE BLDC GLUCOMTR-MCNC: 110 MG/DL (ref 70–105)
GLUCOSE BLDC GLUCOMTR-MCNC: 130 MG/DL (ref 70–105)
GLUCOSE BLDC GLUCOMTR-MCNC: 191 MG/DL (ref 70–105)
GLUCOSE SERPL-MCNC: 121 MG/DL (ref 65–99)
HCT VFR BLD AUTO: 31.1 % (ref 37.5–51)
HGB BLD-MCNC: 10.4 G/DL (ref 13–17.7)
LYMPHOCYTES # BLD AUTO: 0.7 10*3/MM3 (ref 0.7–3.1)
LYMPHOCYTES NFR BLD AUTO: 9.8 % (ref 19.6–45.3)
MAGNESIUM SERPL-MCNC: 2 MG/DL (ref 1.6–2.4)
MCH RBC QN AUTO: 32.4 PG (ref 26.6–33)
MCHC RBC AUTO-ENTMCNC: 33.3 G/DL (ref 31.5–35.7)
MCV RBC AUTO: 97.1 FL (ref 79–97)
MONOCYTES # BLD AUTO: 1.1 10*3/MM3 (ref 0.1–0.9)
MONOCYTES NFR BLD AUTO: 15.3 % (ref 5–12)
NEUTROPHILS NFR BLD AUTO: 5 10*3/MM3 (ref 1.7–7)
NEUTROPHILS NFR BLD AUTO: 71.9 % (ref 42.7–76)
NRBC BLD AUTO-RTO: 0 /100 WBC (ref 0–0.2)
PLATELET # BLD AUTO: 170 10*3/MM3 (ref 140–450)
PMV BLD AUTO: 7.9 FL (ref 6–12)
POTASSIUM SERPL-SCNC: 4.1 MMOL/L (ref 3.5–5.2)
RBC # BLD AUTO: 3.2 10*6/MM3 (ref 4.14–5.8)
SODIUM SERPL-SCNC: 140 MMOL/L (ref 136–145)
UNIT  ABO: NORMAL
UNIT  ABO: NORMAL
UNIT  RH: NORMAL
UNIT  RH: NORMAL
WBC # BLD AUTO: 6.9 10*3/MM3 (ref 3.4–10.8)

## 2020-12-29 PROCEDURE — C1713 ANCHOR/SCREW BN/BN,TIS/BN: HCPCS | Performed by: ORTHOPAEDIC SURGERY

## 2020-12-29 PROCEDURE — C1776 JOINT DEVICE (IMPLANTABLE): HCPCS | Performed by: ORTHOPAEDIC SURGERY

## 2020-12-29 PROCEDURE — 85018 HEMOGLOBIN: CPT | Performed by: STUDENT IN AN ORGANIZED HEALTH CARE EDUCATION/TRAINING PROGRAM

## 2020-12-29 PROCEDURE — 25010000002 PROPOFOL 500 MG/50ML EMULSION: Performed by: ANESTHESIOLOGY

## 2020-12-29 PROCEDURE — 82962 GLUCOSE BLOOD TEST: CPT

## 2020-12-29 PROCEDURE — 0SPR0JZ REMOVAL OF SYNTHETIC SUBSTITUTE FROM RIGHT HIP JOINT, FEMORAL SURFACE, OPEN APPROACH: ICD-10-PCS | Performed by: ORTHOPAEDIC SURGERY

## 2020-12-29 PROCEDURE — 99232 SBSQ HOSP IP/OBS MODERATE 35: CPT | Performed by: INTERNAL MEDICINE

## 2020-12-29 PROCEDURE — 25010000002 DEXAMETHASONE PER 1 MG: Performed by: ANESTHESIOLOGY

## 2020-12-29 PROCEDURE — 0SRR0JA REPLACEMENT OF RIGHT HIP JOINT, FEMORAL SURFACE WITH SYNTHETIC SUBSTITUTE, UNCEMENTED, OPEN APPROACH: ICD-10-PCS | Performed by: ORTHOPAEDIC SURGERY

## 2020-12-29 PROCEDURE — 85014 HEMATOCRIT: CPT | Performed by: STUDENT IN AN ORGANIZED HEALTH CARE EDUCATION/TRAINING PROGRAM

## 2020-12-29 PROCEDURE — 25010000002 CEFAZOLIN PER 500 MG: Performed by: ORTHOPAEDIC SURGERY

## 2020-12-29 PROCEDURE — 36430 TRANSFUSION BLD/BLD COMPNT: CPT

## 2020-12-29 PROCEDURE — 86900 BLOOD TYPING SEROLOGIC ABO: CPT

## 2020-12-29 PROCEDURE — P9016 RBC LEUKOCYTES REDUCED: HCPCS

## 2020-12-29 PROCEDURE — 25010000002 PROPOFOL 200 MG/20ML EMULSION: Performed by: ANESTHESIOLOGY

## 2020-12-29 PROCEDURE — 25010000002 ONDANSETRON PER 1 MG: Performed by: STUDENT IN AN ORGANIZED HEALTH CARE EDUCATION/TRAINING PROGRAM

## 2020-12-29 PROCEDURE — 25010000002 ONDANSETRON PER 1 MG: Performed by: ORTHOPAEDIC SURGERY

## 2020-12-29 PROCEDURE — 25010000002 FENTANYL CITRATE (PF) 100 MCG/2ML SOLUTION: Performed by: STUDENT IN AN ORGANIZED HEALTH CARE EDUCATION/TRAINING PROGRAM

## 2020-12-29 PROCEDURE — 80048 BASIC METABOLIC PNL TOTAL CA: CPT | Performed by: STUDENT IN AN ORGANIZED HEALTH CARE EDUCATION/TRAINING PROGRAM

## 2020-12-29 PROCEDURE — 83735 ASSAY OF MAGNESIUM: CPT | Performed by: STUDENT IN AN ORGANIZED HEALTH CARE EDUCATION/TRAINING PROGRAM

## 2020-12-29 PROCEDURE — 73502 X-RAY EXAM HIP UNI 2-3 VIEWS: CPT

## 2020-12-29 PROCEDURE — 85025 COMPLETE CBC W/AUTO DIFF WBC: CPT | Performed by: STUDENT IN AN ORGANIZED HEALTH CARE EDUCATION/TRAINING PROGRAM

## 2020-12-29 DEVICE — ACCORD 2.0MM COBALT CHROME CABLE
Type: IMPLANTABLE DEVICE | Site: HIP | Status: FUNCTIONAL
Brand: ACCORD

## 2020-12-29 DEVICE — IMPLANTABLE DEVICE: Type: IMPLANTABLE DEVICE | Site: HIP | Status: FUNCTIONAL

## 2020-12-29 DEVICE — DEV CONTRL TISS STRATAFIX SPIRAL PDS PLS CT1 2-0 1/2 30CM: Type: IMPLANTABLE DEVICE | Site: HIP | Status: FUNCTIONAL

## 2020-12-29 DEVICE — ACCORD STANDARD 195MM 8 CABLE                                    TROCHANTERIC GRIP
Type: IMPLANTABLE DEVICE | Site: HIP | Status: FUNCTIONAL
Brand: ACCORD

## 2020-12-29 DEVICE — OXINIUM FEMORAL HEAD 12/14 TAPER                                    28 MM +8
Type: IMPLANTABLE DEVICE | Site: HIP | Status: FUNCTIONAL
Brand: OXINIUM

## 2020-12-29 DEVICE — REDAPT 240MM SLEEVELESS REVISION                                    STEM SIZE 17 STANDARD OFFSET
Type: IMPLANTABLE DEVICE | Site: HIP | Status: FUNCTIONAL
Brand: REDAPT

## 2020-12-29 DEVICE — DEV CONTRL TISS STRATAFIX SPIRAL PLS PDS SH 2/0 30CM: Type: IMPLANTABLE DEVICE | Site: HIP | Status: FUNCTIONAL

## 2020-12-29 DEVICE — DEV CONTRL TISS STRATAFIX PDS PLS SZ1 VIL 18IN 45 CM: Type: IMPLANTABLE DEVICE | Site: HIP | Status: FUNCTIONAL

## 2020-12-29 RX ORDER — SODIUM CHLORIDE, SODIUM LACTATE, POTASSIUM CHLORIDE, CALCIUM CHLORIDE 600; 310; 30; 20 MG/100ML; MG/100ML; MG/100ML; MG/100ML
9 INJECTION, SOLUTION INTRAVENOUS CONTINUOUS PRN
Status: DISCONTINUED | OUTPATIENT
Start: 2020-12-29 | End: 2020-12-29 | Stop reason: HOSPADM

## 2020-12-29 RX ORDER — BUPIVACAINE HYDROCHLORIDE 2.5 MG/ML
INJECTION, SOLUTION EPIDURAL; INFILTRATION; INTRACAUDAL AS NEEDED
Status: DISCONTINUED | OUTPATIENT
Start: 2020-12-29 | End: 2020-12-29 | Stop reason: HOSPADM

## 2020-12-29 RX ORDER — EPHEDRINE SULFATE 50 MG/ML
INJECTION INTRAVENOUS AS NEEDED
Status: DISCONTINUED | OUTPATIENT
Start: 2020-12-29 | End: 2020-12-29 | Stop reason: SURG

## 2020-12-29 RX ORDER — NEOSTIGMINE METHYLSULFATE 5 MG/5 ML
SYRINGE (ML) INTRAVENOUS AS NEEDED
Status: DISCONTINUED | OUTPATIENT
Start: 2020-12-29 | End: 2020-12-29 | Stop reason: SURG

## 2020-12-29 RX ORDER — IPRATROPIUM BROMIDE AND ALBUTEROL SULFATE 2.5; .5 MG/3ML; MG/3ML
3 SOLUTION RESPIRATORY (INHALATION) ONCE AS NEEDED
Status: DISCONTINUED | OUTPATIENT
Start: 2020-12-29 | End: 2020-12-29 | Stop reason: HOSPADM

## 2020-12-29 RX ORDER — SODIUM CHLORIDE 0.9 % (FLUSH) 0.9 %
10 SYRINGE (ML) INJECTION EVERY 12 HOURS SCHEDULED
Status: DISCONTINUED | OUTPATIENT
Start: 2020-12-29 | End: 2020-12-29 | Stop reason: HOSPADM

## 2020-12-29 RX ORDER — DEXAMETHASONE SODIUM PHOSPHATE 4 MG/ML
8 INJECTION, SOLUTION INTRA-ARTICULAR; INTRALESIONAL; INTRAMUSCULAR; INTRAVENOUS; SOFT TISSUE ONCE AS NEEDED
Status: DISCONTINUED | OUTPATIENT
Start: 2020-12-29 | End: 2020-12-29 | Stop reason: HOSPADM

## 2020-12-29 RX ORDER — HYDROCODONE BITARTRATE AND ACETAMINOPHEN 5; 325 MG/1; MG/1
1 TABLET ORAL EVERY 6 HOURS PRN
Status: DISCONTINUED | OUTPATIENT
Start: 2020-12-29 | End: 2020-12-30

## 2020-12-29 RX ORDER — LIDOCAINE HYDROCHLORIDE 10 MG/ML
INJECTION, SOLUTION EPIDURAL; INFILTRATION; INTRACAUDAL; PERINEURAL AS NEEDED
Status: DISCONTINUED | OUTPATIENT
Start: 2020-12-29 | End: 2020-12-29 | Stop reason: SURG

## 2020-12-29 RX ORDER — ACETAMINOPHEN 650 MG
TABLET, EXTENDED RELEASE ORAL AS NEEDED
Status: DISCONTINUED | OUTPATIENT
Start: 2020-12-29 | End: 2020-12-29 | Stop reason: HOSPADM

## 2020-12-29 RX ORDER — SODIUM CHLORIDE 0.9 % (FLUSH) 0.9 %
10 SYRINGE (ML) INJECTION AS NEEDED
Status: DISCONTINUED | OUTPATIENT
Start: 2020-12-29 | End: 2020-12-29 | Stop reason: HOSPADM

## 2020-12-29 RX ORDER — FENTANYL CITRATE 50 UG/ML
50 INJECTION, SOLUTION INTRAMUSCULAR; INTRAVENOUS
Status: DISCONTINUED | OUTPATIENT
Start: 2020-12-29 | End: 2020-12-29 | Stop reason: HOSPADM

## 2020-12-29 RX ORDER — PROPOFOL 10 MG/ML
INJECTION, EMULSION INTRAVENOUS CONTINUOUS PRN
Status: DISCONTINUED | OUTPATIENT
Start: 2020-12-29 | End: 2020-12-29 | Stop reason: SURG

## 2020-12-29 RX ORDER — ROCURONIUM BROMIDE 10 MG/ML
INJECTION, SOLUTION INTRAVENOUS AS NEEDED
Status: DISCONTINUED | OUTPATIENT
Start: 2020-12-29 | End: 2020-12-29 | Stop reason: SURG

## 2020-12-29 RX ORDER — ONDANSETRON 2 MG/ML
4 INJECTION INTRAMUSCULAR; INTRAVENOUS ONCE AS NEEDED
Status: DISCONTINUED | OUTPATIENT
Start: 2020-12-29 | End: 2020-12-29 | Stop reason: HOSPADM

## 2020-12-29 RX ORDER — PROPOFOL 10 MG/ML
INJECTION, EMULSION INTRAVENOUS AS NEEDED
Status: DISCONTINUED | OUTPATIENT
Start: 2020-12-29 | End: 2020-12-29 | Stop reason: SURG

## 2020-12-29 RX ORDER — DEXAMETHASONE SODIUM PHOSPHATE 4 MG/ML
INJECTION, SOLUTION INTRA-ARTICULAR; INTRALESIONAL; INTRAMUSCULAR; INTRAVENOUS; SOFT TISSUE AS NEEDED
Status: DISCONTINUED | OUTPATIENT
Start: 2020-12-29 | End: 2020-12-29 | Stop reason: SURG

## 2020-12-29 RX ADMIN — ROCURONIUM BROMIDE 50 MG: 10 INJECTION, SOLUTION INTRAVENOUS at 14:45

## 2020-12-29 RX ADMIN — EPHEDRINE SULFATE 10 MG: 50 INJECTION, SOLUTION INTRAVENOUS at 15:03

## 2020-12-29 RX ADMIN — HYDROCODONE BITARTRATE AND ACETAMINOPHEN 1 TABLET: 5; 325 TABLET ORAL at 12:10

## 2020-12-29 RX ADMIN — FENTANYL CITRATE 50 MCG: 50 INJECTION, SOLUTION INTRAMUSCULAR; INTRAVENOUS at 14:42

## 2020-12-29 RX ADMIN — PROPOFOL 50 MG: 10 INJECTION, EMULSION INTRAVENOUS at 14:55

## 2020-12-29 RX ADMIN — EPHEDRINE SULFATE 10 MG: 50 INJECTION, SOLUTION INTRAVENOUS at 14:43

## 2020-12-29 RX ADMIN — DEXAMETHASONE SODIUM PHOSPHATE 4 MG: 4 INJECTION, SOLUTION INTRAMUSCULAR; INTRAVENOUS at 15:14

## 2020-12-29 RX ADMIN — PROPOFOL 50 MG: 10 INJECTION, EMULSION INTRAVENOUS at 14:49

## 2020-12-29 RX ADMIN — PROPOFOL 100 MG: 10 INJECTION, EMULSION INTRAVENOUS at 14:42

## 2020-12-29 RX ADMIN — ONDANSETRON 4 MG: 2 INJECTION INTRAMUSCULAR; INTRAVENOUS at 16:32

## 2020-12-29 RX ADMIN — TRANEXAMIC ACID 1000 MG: 100 INJECTION, SOLUTION INTRAVENOUS at 14:52

## 2020-12-29 RX ADMIN — Medication 4 MG: at 16:27

## 2020-12-29 RX ADMIN — TRANEXAMIC ACID 1000 MG: 100 INJECTION, SOLUTION INTRAVENOUS at 16:05

## 2020-12-29 RX ADMIN — HYDROCODONE BITARTRATE AND ACETAMINOPHEN 1 TABLET: 5; 325 TABLET ORAL at 23:26

## 2020-12-29 RX ADMIN — FENTANYL CITRATE 25 MCG: 50 INJECTION, SOLUTION INTRAMUSCULAR; INTRAVENOUS at 15:57

## 2020-12-29 RX ADMIN — CEFAZOLIN SODIUM 2 G: 10 INJECTION, POWDER, FOR SOLUTION INTRAVENOUS at 21:44

## 2020-12-29 RX ADMIN — ONDANSETRON 4 MG: 2 INJECTION INTRAMUSCULAR; INTRAVENOUS at 23:26

## 2020-12-29 RX ADMIN — Medication 10 ML: at 13:51

## 2020-12-29 RX ADMIN — FENTANYL CITRATE 25 MCG: 50 INJECTION, SOLUTION INTRAMUSCULAR; INTRAVENOUS at 14:50

## 2020-12-29 RX ADMIN — PROPOFOL 100 MCG/KG/MIN: 10 INJECTION, EMULSION INTRAVENOUS at 14:54

## 2020-12-29 RX ADMIN — CEFAZOLIN SODIUM 2 G: 1 INJECTION, POWDER, FOR SOLUTION INTRAMUSCULAR; INTRAVENOUS at 14:39

## 2020-12-29 RX ADMIN — EPHEDRINE SULFATE 10 MG: 50 INJECTION, SOLUTION INTRAVENOUS at 15:57

## 2020-12-29 RX ADMIN — GLYCOPYRROLATE 0.6 MG: 0.2 INJECTION, SOLUTION INTRAMUSCULAR; INTRAVITREAL at 16:27

## 2020-12-29 RX ADMIN — ROCURONIUM BROMIDE 10 MG: 10 INJECTION, SOLUTION INTRAVENOUS at 15:32

## 2020-12-29 RX ADMIN — Medication 10 ML: at 21:44

## 2020-12-29 RX ADMIN — LIDOCAINE HYDROCHLORIDE 50 MG: 10 INJECTION, SOLUTION EPIDURAL; INFILTRATION; INTRACAUDAL; PERINEURAL at 14:42

## 2020-12-29 RX ADMIN — SODIUM CHLORIDE: 9 INJECTION, SOLUTION INTRAVENOUS at 16:01

## 2020-12-29 NOTE — ANESTHESIA PREPROCEDURE EVALUATION
Anesthesia Evaluation     NPO Solid Status: > 8 hours  NPO Liquid Status: > 8 hours           Airway   Mallampati: II  TM distance: >3 FB  No difficulty expected  Dental    (+) partials    Pulmonary    Cardiovascular   Exercise tolerance: good (4-7 METS)    PT is on anticoagulation therapy    (+) hypertension, past MI  >12 months, CAD, dysrhythmias Atrial Fib,     ROS comment: · Estimated left ventricular EF was in agreement with the calculated left ventricular EF. Left ventricular ejection fraction appears to be 56 - 60%. Left ventricular systolic function is normal.  · There is mild, bileaflet mitral valve thickening present.  · Left ventricular wall thickness is consistent with moderate concentric hypertrophy.  · Severe tricuspid valve regurgitation is present.  · Estimated right ventricular systolic pressure from tricuspid regurgitation is markedly elevated (>55 mmHg). Calculated right ventricular systolic pressure from tricuspid regurgitation is 62.2 mmHg.  · Left ventricular diastolic function is consistent with (grade Ia w/high LAP) impaired relaxation.  · The right ventricular cavity is mild to moderately dilated.    Neuro/Psych  (+) seizures well controlled, numbness,     GI/Hepatic/Renal/Endo    (+)  GERD,  diabetes mellitus well controlled,     Musculoskeletal     (+) gait problem,   Abdominal    Substance History      OB/GYN          Other   arthritis,      ROS/Med Hx Other: Right hip fracture  Hx of dvt on eliquis  Sp ivc filter  Hx afib : Sp dccv this year  History of coronary artery disease status post remote CABG [facility]  TBI history with epilepsy  Status post hip and knee replacements   Diabetes mellitus type 2, chronic  GERD  Essential Hypertension                  Anesthesia Plan    ASA 3     general   total IV anesthesia(Postop delirium prior, will plan TIVA )  intravenous induction     Anesthetic plan, all risks, benefits, and alternatives have been provided, discussed and informed  consent has been obtained with: patient.

## 2020-12-29 NOTE — ANESTHESIA POSTPROCEDURE EVALUATION
Patient: Jason Arceo    Procedure Summary     Date: 12/29/20 Room / Location: [facility] OR 12 / [facility]    Anesthesia Start: 1438 Anesthesia Stop: 1640    Procedure: right posterior TOTAL HIP ARTHROPLASTY REVISION, pegboard lateral (Right Hip) Diagnosis:       Closed fracture of right hip, initial encounter (CMS/[facility])      (Closed fracture of right hip, initial encounter (CMS/[facility]) [S72.001A])    Surgeon: Damian Quijano II, MD Provider: Dustin Velasquez MD    Anesthesia Type: general ASA Status: 3          Anesthesia Type: general    Vitals  Vitals Value Taken Time   /54 12/29/20 1810   Temp 97.3 °F (36.3 °C) 12/29/20 1750   Pulse 55 12/29/20 1817   Resp 10 12/29/20 1810   SpO2 96 % 12/29/20 1817   Vitals shown include unvalidated device data.        Post Anesthesia Care and Evaluation    Patient location during evaluation: PACU  Patient participation: complete - patient participated  Level of consciousness: awake  Pain scale: See nurse's notes for pain score.  Pain management: adequate  Airway patency: patent  Anesthetic complications: No anesthetic complications  PONV Status: none  Cardiovascular status: acceptable  Respiratory status: acceptable  Hydration status: acceptable    Comments: Patient seen and examined postoperatively; vital signs stable; SpO2 greater than or equal to 90%; cardiopulmonary status stable; nausea/vomiting adequately controlled; pain adequately controlled; no apparent anesthesia complications; patient discharged from anesthesia care when discharge criteria were met

## 2020-12-30 ENCOUNTER — APPOINTMENT (OUTPATIENT)
Dept: CT IMAGING | Facility: HOSPITAL | Age: 75
End: 2020-12-30

## 2020-12-30 LAB
ANION GAP SERPL CALCULATED.3IONS-SCNC: 8 MMOL/L (ref 5–15)
BASOPHILS # BLD AUTO: 0 10*3/MM3 (ref 0–0.2)
BASOPHILS NFR BLD AUTO: 0.1 % (ref 0–1.5)
BUN SERPL-MCNC: 13 MG/DL (ref 8–23)
BUN/CREAT SERPL: 11.4 (ref 7–25)
CALCIUM SPEC-SCNC: 8.6 MG/DL (ref 8.6–10.5)
CHLORIDE SERPL-SCNC: 109 MMOL/L (ref 98–107)
CO2 SERPL-SCNC: 22 MMOL/L (ref 22–29)
CREAT SERPL-MCNC: 1.14 MG/DL (ref 0.76–1.27)
DEPRECATED RDW RBC AUTO: 57.8 FL (ref 37–54)
EOSINOPHIL # BLD AUTO: 0 10*3/MM3 (ref 0–0.4)
EOSINOPHIL NFR BLD AUTO: 0 % (ref 0.3–6.2)
ERYTHROCYTE [DISTWIDTH] IN BLOOD BY AUTOMATED COUNT: 17.2 % (ref 12.3–15.4)
GFR SERPL CREATININE-BSD FRML MDRD: 63 ML/MIN/1.73
GLUCOSE BLDC GLUCOMTR-MCNC: 130 MG/DL (ref 70–105)
GLUCOSE BLDC GLUCOMTR-MCNC: 151 MG/DL (ref 70–105)
GLUCOSE BLDC GLUCOMTR-MCNC: 153 MG/DL (ref 70–105)
GLUCOSE BLDC GLUCOMTR-MCNC: 188 MG/DL (ref 70–105)
GLUCOSE SERPL-MCNC: 146 MG/DL (ref 65–99)
HCT VFR BLD AUTO: 33.3 % (ref 37.5–51)
HCT VFR BLD AUTO: 34.8 % (ref 37.5–51)
HGB BLD-MCNC: 11.1 G/DL (ref 13–17.7)
HGB BLD-MCNC: 11.5 G/DL (ref 13–17.7)
HOLD SPECIMEN: NORMAL
HOLD SPECIMEN: NORMAL
LYMPHOCYTES # BLD AUTO: 0.5 10*3/MM3 (ref 0.7–3.1)
LYMPHOCYTES NFR BLD AUTO: 3.5 % (ref 19.6–45.3)
MAGNESIUM SERPL-MCNC: 1.9 MG/DL (ref 1.6–2.4)
MCH RBC QN AUTO: 32.4 PG (ref 26.6–33)
MCHC RBC AUTO-ENTMCNC: 33.5 G/DL (ref 31.5–35.7)
MCV RBC AUTO: 96.6 FL (ref 79–97)
MONOCYTES # BLD AUTO: 1.1 10*3/MM3 (ref 0.1–0.9)
MONOCYTES NFR BLD AUTO: 8.6 % (ref 5–12)
NEUTROPHILS NFR BLD AUTO: 11.5 10*3/MM3 (ref 1.7–7)
NEUTROPHILS NFR BLD AUTO: 87.8 % (ref 42.7–76)
NRBC BLD AUTO-RTO: 0 /100 WBC (ref 0–0.2)
PLATELET # BLD AUTO: 178 10*3/MM3 (ref 140–450)
PMV BLD AUTO: 8.2 FL (ref 6–12)
POTASSIUM SERPL-SCNC: 4.2 MMOL/L (ref 3.5–5.2)
RBC # BLD AUTO: 3.44 10*6/MM3 (ref 4.14–5.8)
SODIUM SERPL-SCNC: 139 MMOL/L (ref 136–145)
WBC # BLD AUTO: 13.1 10*3/MM3 (ref 3.4–10.8)

## 2020-12-30 PROCEDURE — 25010000002 CEFAZOLIN PER 500 MG: Performed by: ORTHOPAEDIC SURGERY

## 2020-12-30 PROCEDURE — 80048 BASIC METABOLIC PNL TOTAL CA: CPT | Performed by: ORTHOPAEDIC SURGERY

## 2020-12-30 PROCEDURE — 99232 SBSQ HOSP IP/OBS MODERATE 35: CPT | Performed by: INTERNAL MEDICINE

## 2020-12-30 PROCEDURE — 25010000002 HALOPERIDOL LACTATE PER 5 MG: Performed by: STUDENT IN AN ORGANIZED HEALTH CARE EDUCATION/TRAINING PROGRAM

## 2020-12-30 PROCEDURE — 70450 CT HEAD/BRAIN W/O DYE: CPT

## 2020-12-30 PROCEDURE — 97116 GAIT TRAINING THERAPY: CPT

## 2020-12-30 PROCEDURE — 97162 PT EVAL MOD COMPLEX 30 MIN: CPT

## 2020-12-30 PROCEDURE — 83735 ASSAY OF MAGNESIUM: CPT | Performed by: ORTHOPAEDIC SURGERY

## 2020-12-30 PROCEDURE — 85025 COMPLETE CBC W/AUTO DIFF WBC: CPT | Performed by: ORTHOPAEDIC SURGERY

## 2020-12-30 PROCEDURE — 82962 GLUCOSE BLOOD TEST: CPT

## 2020-12-30 RX ORDER — CHOLECALCIFEROL (VITAMIN D3) 125 MCG
5 CAPSULE ORAL NIGHTLY
Status: DISCONTINUED | OUTPATIENT
Start: 2020-12-30 | End: 2021-01-01 | Stop reason: HOSPADM

## 2020-12-30 RX ORDER — TAMSULOSIN HYDROCHLORIDE 0.4 MG/1
0.8 CAPSULE ORAL DAILY
Status: DISCONTINUED | OUTPATIENT
Start: 2020-12-31 | End: 2021-01-01 | Stop reason: HOSPADM

## 2020-12-30 RX ORDER — HALOPERIDOL 5 MG/ML
2 INJECTION INTRAMUSCULAR ONCE
Status: COMPLETED | OUTPATIENT
Start: 2020-12-30 | End: 2020-12-30

## 2020-12-30 RX ADMIN — HALOPERIDOL LACTATE 2 MG: 5 INJECTION INTRAMUSCULAR at 04:42

## 2020-12-30 RX ADMIN — TOPIRAMATE 50 MG: 25 TABLET, FILM COATED ORAL at 09:24

## 2020-12-30 RX ADMIN — APIXABAN 5 MG: 5 TABLET, FILM COATED ORAL at 21:46

## 2020-12-30 RX ADMIN — TAMSULOSIN HYDROCHLORIDE 0.4 MG: 0.4 CAPSULE ORAL at 09:24

## 2020-12-30 RX ADMIN — APIXABAN 5 MG: 5 TABLET, FILM COATED ORAL at 09:24

## 2020-12-30 RX ADMIN — FOLIC ACID 1000 MCG: 1 TABLET ORAL at 09:24

## 2020-12-30 RX ADMIN — PANTOPRAZOLE SODIUM 40 MG: 40 TABLET, DELAYED RELEASE ORAL at 16:52

## 2020-12-30 RX ADMIN — CEFAZOLIN SODIUM 2 G: 10 INJECTION, POWDER, FOR SOLUTION INTRAVENOUS at 06:32

## 2020-12-30 RX ADMIN — LOSARTAN POTASSIUM 25 MG: 25 TABLET, FILM COATED ORAL at 09:24

## 2020-12-30 RX ADMIN — POLYETHYLENE GLYCOL 3350 17 G: 17 POWDER, FOR SOLUTION ORAL at 09:23

## 2020-12-30 RX ADMIN — Medication 5 MG: at 21:47

## 2020-12-30 RX ADMIN — PANTOPRAZOLE SODIUM 40 MG: 40 TABLET, DELAYED RELEASE ORAL at 09:23

## 2020-12-30 RX ADMIN — CEFAZOLIN SODIUM 2 G: 10 INJECTION, POWDER, FOR SOLUTION INTRAVENOUS at 14:03

## 2020-12-30 RX ADMIN — Medication 10 ML: at 21:50

## 2020-12-31 LAB
ANION GAP SERPL CALCULATED.3IONS-SCNC: 9 MMOL/L (ref 5–15)
BASOPHILS # BLD AUTO: 0 10*3/MM3 (ref 0–0.2)
BASOPHILS NFR BLD AUTO: 0.2 % (ref 0–1.5)
BH BB BLOOD EXPIRATION DATE: NORMAL
BH BB BLOOD TYPE BARCODE: 5100
BH BB DISPENSE STATUS: NORMAL
BH BB PRODUCT CODE: NORMAL
BH BB UNIT NUMBER: NORMAL
BUN SERPL-MCNC: 14 MG/DL (ref 8–23)
BUN/CREAT SERPL: 11 (ref 7–25)
CALCIUM SPEC-SCNC: 8.5 MG/DL (ref 8.6–10.5)
CHLORIDE SERPL-SCNC: 104 MMOL/L (ref 98–107)
CO2 SERPL-SCNC: 22 MMOL/L (ref 22–29)
CREAT SERPL-MCNC: 1.27 MG/DL (ref 0.76–1.27)
CROSSMATCH INTERPRETATION: NORMAL
DEPRECATED RDW RBC AUTO: 58.6 FL (ref 37–54)
EOSINOPHIL # BLD AUTO: 0 10*3/MM3 (ref 0–0.4)
EOSINOPHIL NFR BLD AUTO: 0.1 % (ref 0.3–6.2)
ERYTHROCYTE [DISTWIDTH] IN BLOOD BY AUTOMATED COUNT: 17.4 % (ref 12.3–15.4)
GFR SERPL CREATININE-BSD FRML MDRD: 55 ML/MIN/1.73
GLUCOSE BLDC GLUCOMTR-MCNC: 116 MG/DL (ref 70–105)
GLUCOSE BLDC GLUCOMTR-MCNC: 122 MG/DL (ref 70–105)
GLUCOSE BLDC GLUCOMTR-MCNC: 126 MG/DL (ref 70–105)
GLUCOSE BLDC GLUCOMTR-MCNC: 128 MG/DL (ref 70–105)
GLUCOSE BLDC GLUCOMTR-MCNC: 129 MG/DL (ref 70–105)
GLUCOSE SERPL-MCNC: 116 MG/DL (ref 65–99)
HCT VFR BLD AUTO: 29.7 % (ref 37.5–51)
HGB BLD-MCNC: 9.8 G/DL (ref 13–17.7)
LYMPHOCYTES # BLD AUTO: 0.7 10*3/MM3 (ref 0.7–3.1)
LYMPHOCYTES NFR BLD AUTO: 6 % (ref 19.6–45.3)
MAGNESIUM SERPL-MCNC: 1.8 MG/DL (ref 1.6–2.4)
MCH RBC QN AUTO: 31.8 PG (ref 26.6–33)
MCHC RBC AUTO-ENTMCNC: 33 G/DL (ref 31.5–35.7)
MCV RBC AUTO: 96.6 FL (ref 79–97)
MONOCYTES # BLD AUTO: 2.2 10*3/MM3 (ref 0.1–0.9)
MONOCYTES NFR BLD AUTO: 18 % (ref 5–12)
NEUTROPHILS NFR BLD AUTO: 75.7 % (ref 42.7–76)
NEUTROPHILS NFR BLD AUTO: 9.2 10*3/MM3 (ref 1.7–7)
NRBC BLD AUTO-RTO: 0 /100 WBC (ref 0–0.2)
PLATELET # BLD AUTO: 174 10*3/MM3 (ref 140–450)
PMV BLD AUTO: 8.6 FL (ref 6–12)
POTASSIUM SERPL-SCNC: 4.1 MMOL/L (ref 3.5–5.2)
QT INTERVAL: 457 MS
RBC # BLD AUTO: 3.07 10*6/MM3 (ref 4.14–5.8)
SODIUM SERPL-SCNC: 135 MMOL/L (ref 136–145)
UNIT  ABO: NORMAL
UNIT  RH: NORMAL
WBC # BLD AUTO: 12.1 10*3/MM3 (ref 3.4–10.8)

## 2020-12-31 PROCEDURE — 97116 GAIT TRAINING THERAPY: CPT

## 2020-12-31 PROCEDURE — 80048 BASIC METABOLIC PNL TOTAL CA: CPT | Performed by: ORTHOPAEDIC SURGERY

## 2020-12-31 PROCEDURE — 83735 ASSAY OF MAGNESIUM: CPT | Performed by: ORTHOPAEDIC SURGERY

## 2020-12-31 PROCEDURE — 85025 COMPLETE CBC W/AUTO DIFF WBC: CPT | Performed by: ORTHOPAEDIC SURGERY

## 2020-12-31 PROCEDURE — 97530 THERAPEUTIC ACTIVITIES: CPT

## 2020-12-31 PROCEDURE — 82962 GLUCOSE BLOOD TEST: CPT

## 2020-12-31 PROCEDURE — 99232 SBSQ HOSP IP/OBS MODERATE 35: CPT | Performed by: INTERNAL MEDICINE

## 2020-12-31 PROCEDURE — 97110 THERAPEUTIC EXERCISES: CPT

## 2020-12-31 RX ORDER — TOPIRAMATE 50 MG/1
50 TABLET, FILM COATED ORAL DAILY
Start: 2021-01-01 | End: 2020-12-31 | Stop reason: HOSPADM

## 2020-12-31 RX ORDER — HYDROCODONE BITARTRATE AND ACETAMINOPHEN 5; 325 MG/1; MG/1
1 TABLET ORAL EVERY 6 HOURS PRN
Qty: 10 TABLET | Refills: 0 | Status: SHIPPED | OUTPATIENT
Start: 2020-12-31 | End: 2021-01-05

## 2020-12-31 RX ORDER — TAMSULOSIN HYDROCHLORIDE 0.4 MG/1
0.8 CAPSULE ORAL DAILY
Qty: 30 CAPSULE
Start: 2021-01-01 | End: 2020-12-31

## 2020-12-31 RX ORDER — TAMSULOSIN HYDROCHLORIDE 0.4 MG/1
0.8 CAPSULE ORAL DAILY
Qty: 30 CAPSULE | Refills: 3 | Status: SHIPPED | OUTPATIENT
Start: 2021-01-01 | End: 2021-01-31

## 2020-12-31 RX ORDER — TOPIRAMATE 25 MG/1
25 TABLET ORAL 2 TIMES DAILY
Qty: 180 TABLET | Refills: 3
Start: 2020-12-31 | End: 2021-10-11

## 2020-12-31 RX ORDER — LOSARTAN POTASSIUM 25 MG/1
25 TABLET ORAL
Start: 2021-01-01 | End: 2021-10-11

## 2020-12-31 RX ADMIN — TOPIRAMATE 50 MG: 25 TABLET, FILM COATED ORAL at 10:39

## 2020-12-31 RX ADMIN — APIXABAN 5 MG: 5 TABLET, FILM COATED ORAL at 10:39

## 2020-12-31 RX ADMIN — Medication 5 MG: at 20:23

## 2020-12-31 RX ADMIN — PANTOPRAZOLE SODIUM 40 MG: 40 TABLET, DELAYED RELEASE ORAL at 07:46

## 2020-12-31 RX ADMIN — SODIUM CHLORIDE 100 ML/HR: 9 INJECTION, SOLUTION INTRAVENOUS at 17:21

## 2020-12-31 RX ADMIN — FOLIC ACID 1000 MCG: 1 TABLET ORAL at 10:39

## 2020-12-31 RX ADMIN — TAMSULOSIN HYDROCHLORIDE 0.8 MG: 0.4 CAPSULE ORAL at 10:39

## 2020-12-31 RX ADMIN — POLYETHYLENE GLYCOL 3350 17 G: 17 POWDER, FOR SOLUTION ORAL at 10:39

## 2020-12-31 RX ADMIN — LOSARTAN POTASSIUM 25 MG: 25 TABLET, FILM COATED ORAL at 10:39

## 2020-12-31 RX ADMIN — SODIUM CHLORIDE 100 ML/HR: 9 INJECTION, SOLUTION INTRAVENOUS at 07:46

## 2020-12-31 RX ADMIN — PANTOPRAZOLE SODIUM 40 MG: 40 TABLET, DELAYED RELEASE ORAL at 17:21

## 2020-12-31 RX ADMIN — Medication 10 ML: at 20:23

## 2020-12-31 RX ADMIN — Medication 10 ML: at 10:47

## 2020-12-31 RX ADMIN — APIXABAN 5 MG: 5 TABLET, FILM COATED ORAL at 20:23

## 2021-01-01 VITALS
HEIGHT: 74 IN | WEIGHT: 208.78 LBS | HEART RATE: 66 BPM | DIASTOLIC BLOOD PRESSURE: 54 MMHG | RESPIRATION RATE: 14 BRPM | BODY MASS INDEX: 26.79 KG/M2 | SYSTOLIC BLOOD PRESSURE: 111 MMHG | TEMPERATURE: 98 F | OXYGEN SATURATION: 98 %

## 2021-01-01 LAB
ANION GAP SERPL CALCULATED.3IONS-SCNC: 9 MMOL/L (ref 5–15)
BASOPHILS # BLD AUTO: 0 10*3/MM3 (ref 0–0.2)
BASOPHILS NFR BLD AUTO: 0.4 % (ref 0–1.5)
BUN SERPL-MCNC: 15 MG/DL (ref 8–23)
BUN/CREAT SERPL: 12.9 (ref 7–25)
CALCIUM SPEC-SCNC: 8.2 MG/DL (ref 8.6–10.5)
CHLORIDE SERPL-SCNC: 107 MMOL/L (ref 98–107)
CO2 SERPL-SCNC: 20 MMOL/L (ref 22–29)
CREAT SERPL-MCNC: 1.16 MG/DL (ref 0.76–1.27)
DEPRECATED RDW RBC AUTO: 63 FL (ref 37–54)
EOSINOPHIL # BLD AUTO: 0 10*3/MM3 (ref 0–0.4)
EOSINOPHIL NFR BLD AUTO: 0.3 % (ref 0.3–6.2)
ERYTHROCYTE [DISTWIDTH] IN BLOOD BY AUTOMATED COUNT: 17.3 % (ref 12.3–15.4)
GFR SERPL CREATININE-BSD FRML MDRD: 61 ML/MIN/1.73
GLUCOSE BLDC GLUCOMTR-MCNC: 109 MG/DL (ref 70–105)
GLUCOSE BLDC GLUCOMTR-MCNC: 126 MG/DL (ref 70–105)
GLUCOSE SERPL-MCNC: 107 MG/DL (ref 65–99)
HCT VFR BLD AUTO: 28.2 % (ref 37.5–51)
HGB BLD-MCNC: 9.1 G/DL (ref 13–17.7)
LYMPHOCYTES # BLD AUTO: 0.9 10*3/MM3 (ref 0.7–3.1)
LYMPHOCYTES NFR BLD AUTO: 7.7 % (ref 19.6–45.3)
MAGNESIUM SERPL-MCNC: 2.1 MG/DL (ref 1.6–2.4)
MCH RBC QN AUTO: 32.3 PG (ref 26.6–33)
MCHC RBC AUTO-ENTMCNC: 32.3 G/DL (ref 31.5–35.7)
MCV RBC AUTO: 100.1 FL (ref 79–97)
MONOCYTES # BLD AUTO: 1.8 10*3/MM3 (ref 0.1–0.9)
MONOCYTES NFR BLD AUTO: 15.6 % (ref 5–12)
NEUTROPHILS NFR BLD AUTO: 76 % (ref 42.7–76)
NEUTROPHILS NFR BLD AUTO: 8.8 10*3/MM3 (ref 1.7–7)
NRBC BLD AUTO-RTO: 0 /100 WBC (ref 0–0.2)
PLATELET # BLD AUTO: 175 10*3/MM3 (ref 140–450)
PMV BLD AUTO: 8.8 FL (ref 6–12)
POTASSIUM SERPL-SCNC: 4 MMOL/L (ref 3.5–5.2)
RBC # BLD AUTO: 2.82 10*6/MM3 (ref 4.14–5.8)
SODIUM SERPL-SCNC: 136 MMOL/L (ref 136–145)
WBC # BLD AUTO: 11.6 10*3/MM3 (ref 3.4–10.8)

## 2021-01-01 PROCEDURE — 99239 HOSP IP/OBS DSCHRG MGMT >30: CPT | Performed by: INTERNAL MEDICINE

## 2021-01-01 PROCEDURE — 82962 GLUCOSE BLOOD TEST: CPT

## 2021-01-01 PROCEDURE — 80048 BASIC METABOLIC PNL TOTAL CA: CPT | Performed by: ORTHOPAEDIC SURGERY

## 2021-01-01 PROCEDURE — 83735 ASSAY OF MAGNESIUM: CPT | Performed by: ORTHOPAEDIC SURGERY

## 2021-01-01 PROCEDURE — 85025 COMPLETE CBC W/AUTO DIFF WBC: CPT | Performed by: ORTHOPAEDIC SURGERY

## 2021-01-01 RX ADMIN — POLYETHYLENE GLYCOL 3350 17 G: 17 POWDER, FOR SOLUTION ORAL at 08:52

## 2021-01-01 RX ADMIN — LOSARTAN POTASSIUM 25 MG: 25 TABLET, FILM COATED ORAL at 08:52

## 2021-01-01 RX ADMIN — PANTOPRAZOLE SODIUM 40 MG: 40 TABLET, DELAYED RELEASE ORAL at 08:53

## 2021-01-01 RX ADMIN — Medication 10 ML: at 08:52

## 2021-01-01 RX ADMIN — APIXABAN 5 MG: 5 TABLET, FILM COATED ORAL at 08:52

## 2021-01-01 RX ADMIN — TOPIRAMATE 50 MG: 25 TABLET, FILM COATED ORAL at 08:52

## 2021-01-01 RX ADMIN — TAMSULOSIN HYDROCHLORIDE 0.8 MG: 0.4 CAPSULE ORAL at 08:52

## 2021-01-01 RX ADMIN — HYDROCODONE BITARTRATE AND ACETAMINOPHEN 1 TABLET: 5; 325 TABLET ORAL at 02:32

## 2021-01-01 RX ADMIN — FOLIC ACID 1000 MCG: 1 TABLET ORAL at 08:53

## 2021-01-01 NOTE — PROGRESS NOTES
[facility] Medicine Services Daily Progress Note      Hospitalist Team  LOS 6 days      Patient Care Team:  Ravi Guevara MD as PCP - General (Family Medicine)    Patient Location: 4108/1      Subjective   Subjective     Chief Complaint / Subjective  hip pain    Brief Synopsis of Hospital Course/HPI  Mr. Arceo is a 75 y.o. male who presents to [facility] as a transfer from [facility] with a history of diabetes mellitus type 2, traumatic brain injury, epilepsy, atrial fibrillation complaining of fall.  Patient's wife states patient is a poor historian due to his traumatic brain injury.  HPI information from patient's wife who is at bedside.  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      At [facility], bilateral hip x-ray shows mildly displaced Tommy prosthetic fracture along the proximal femoral diaphyseal component of the patient's total right hip arthroplasty, left hip arthroplasty appears intact no additional fractures.  X-ray right knee shows no fracture identified, a joint effusion is present which is nonspecific and may be related to internal derangement, significant tricompartmental osteoarthritic changes are present most present within the medial compartment.  Hospitalist consulted for medical management of patient.         Date::      12/28  Co rt hip pain  Cardiac hx reviewed  Consult card for preop clearance  ekg reveiwed  dw pt and wife at bedside      12/29  Patient went for surgery late and was not able to see the patient as he stayed in the OR late at night.    ROS  All other systems reviewed and neg    Objective   Objective      Vital Signs  Temp:  [98 °F (36.7 °C)-98.7 °F (37.1 °C)] 98 °F (36.7 °C)  Heart Rate:  [61-68] 66  Resp:  [14-17] 14  BP: (100-111)/(48-62) 111/54  Oxygen Therapy  SpO2: 98 %  Pulse Oximetry Type: Intermittent  Device (Oxygen Therapy): room  "air  Device (Oxygen Therapy): room air  Flow (L/min): 0  Flowsheet Rows      First Filed Value   Admission Height  188 cm (74.02\") Documented at 12/26/2020 2130   Admission Weight  93.8 kg (206 lb 12.7 oz) Documented at 12/26/2020 2128        Intake & Output (last 3 days)       12/29 0701 - 12/30 0700 12/30 0701 - 12/31 0700 12/31 0701 - 01/01 0700 01/01 0701 - 01/02 0700    P.O.  1920 1200 960    I.V. (mL/kg) 1400 (15.1)       Blood 300       Total Intake(mL/kg) 1700 (18.4) 1920 (21.4) 1200 (12.7) 960 (10.1)    Urine (mL/kg/hr) 2250 (1) 3400 (1.6) 2100 (0.9) 200 (0.2)    Stool    375    Blood 1000       Total Output 3250 3400 2100 575    Net -1550 -1480 -900 +385            Urine Unmeasured Occurrence   2 x         Lines, Drains & Airways    Active LDAs     Name:   Placement date:   Placement time:   Site:   Days:    Peripheral IV 12/26/20 Anterior;Right Forearm   12/26/20    --    Forearm   2                  Physical Exam:    Physical Exam  Vitals signs and nursing note reviewed.   Constitutional:       General: He is not in acute distress.     Appearance: Normal appearance. He is well-developed. He is not ill-appearing, toxic-appearing or diaphoretic.   HENT:      Head: Normocephalic and atraumatic.      Right Ear: Ear canal and external ear normal.      Left Ear: Ear canal and external ear normal.      Nose: Nose normal. No congestion or rhinorrhea.      Mouth/Throat:      Mouth: Mucous membranes are moist.      Pharynx: No oropharyngeal exudate.   Eyes:      General: No scleral icterus.        Right eye: No discharge.         Left eye: No discharge.      Extraocular Movements: Extraocular movements intact.      Conjunctiva/sclera: Conjunctivae normal.      Pupils: Pupils are equal, round, and reactive to light.   Neck:      Musculoskeletal: Normal range of motion and neck supple. No neck rigidity or muscular tenderness.      Thyroid: No thyromegaly.      Vascular: No carotid bruit or JVD.      Trachea: No " tracheal deviation.   Cardiovascular:      Rate and Rhythm: Normal rate and regular rhythm.      Pulses: Normal pulses.      Heart sounds: Normal heart sounds. No murmur. No friction rub. No gallop.    Pulmonary:      Effort: Pulmonary effort is normal. No respiratory distress.      Breath sounds: Normal breath sounds. No stridor. No wheezing, rhonchi or rales.   Chest:      Chest wall: No tenderness.   Abdominal:      General: Bowel sounds are normal. There is no distension.      Palpations: Abdomen is soft. There is no mass.      Tenderness: There is no abdominal tenderness. There is no guarding or rebound.      Hernia: No hernia is present.   Musculoskeletal: Normal range of motion.         General: No swelling, tenderness, deformity or signs of injury.      Right lower leg: No edema.      Left lower leg: No edema.   Lymphadenopathy:      Cervical: No cervical adenopathy.   Skin:     General: Skin is warm and dry.      Coloration: Skin is not jaundiced or pale.      Findings: No bruising, erythema or rash.   Neurological:      General: No focal deficit present.      Mental Status: He is alert and oriented to person, place, and time. Mental status is at baseline.      Cranial Nerves: No cranial nerve deficit.      Sensory: No sensory deficit.      Motor: No weakness or abnormal muscle tone.      Coordination: Coordination normal.   Psychiatric:         Mood and Affect: Mood normal.         Behavior: Behavior normal.         Thought Content: Thought content normal.         Judgment: Judgment normal.              Wounds (last 24 hours)      LDA Wound     Row Name 01/01/21 0728 12/31/20 1915          Wound 12/28/20 2150 Bilateral medial coccyx Pressure Injury    Wound - Properties Group Placement Date: 12/28/20  -AB Placement Time: 2150 -AB Side: Bilateral  -AB, medial  Orientation: medial  -AB Location: coccyx  -AB Primary Wound Type: Pressure inj  -AB Stage, Pressure Injury : Stage 1  -AB Additional Comments: skin  barrier applied and mepilex applied, added patient to turn team  -AB    Dressing Appearance  dry;intact;no drainage  -HO  dry;intact;no drainage  -JR     Closure  ANA  -HO  ANA  -JR     Base  dressing in place, unable to visualize  -HO  dressing in place, unable to visualize  -JR     Drainage Amount  none  -HO  --     Dressing Care  silicone  -HO  silicone  -JR     Retired Wound - Properties Group Date first assessed: 12/28/20  -AB Time first assessed: 2150  -AB Side: Bilateral  -AB, medial  Location: coccyx  -AB Primary Wound Type: Pressure inj  -AB Additional Comments: skin barrier applied and mepilex applied, added patient to turn team  -AB       Wound 12/29/20 1520 Right anterior greater trochanter    Wound - Properties Group Placement Date: 12/29/20  -CW Placement Time: 1520  -CW Present on Hospital Admission: N  -CW Side: Right  -CW Orientation: anterior  -CW Location: greater trochanter  -CW    Dressing Appearance  dry;intact;no drainage  -HO  dry;intact;no drainage  -JR     Closure  ANA  -HO  ANA  -JR     Base  dressing in place, unable to visualize  -HO  dressing in place, unable to visualize  -JR     Drainage Amount  none  -HO  --     Retired Wound - Properties Group Date first assessed: 12/29/20  -CW Time first assessed: 1520  -CW Present on Hospital Admission: N  -CW Side: Right  -CW Location: greater trochanter  -CW      User Key  (r) = Recorded By, (t) = Taken By, (c) = Cosigned By    Initials Name Provider Type    CW Angely Figueroa RN Registered Nurse    La Chowdhury LPN Licensed Nurse    Heydi Francis LPN Licensed Nurse    Jessica Elias LPN Licensed Nurse          Procedures:    Procedure(s):  HIP CANNULATED SCREW PLACEMENT          Results Review:     I reviewed the patient's new clinical results.      Lab Results (last 24 hours)     Procedure Component Value Units Date/Time    POC Glucose Once [[id number]]  (Abnormal) Collected: 01/01/21 1143    Specimen: Blood Updated:  01/01/21 1152     Glucose 109 mg/dL      Comment: Serial Number: [id number]:  [id number]       POC Glucose Once [[id number]]  (Abnormal) Collected: 01/01/21 0821    Specimen: Blood Updated: 01/01/21 0822     Glucose 126 mg/dL      Comment: Serial Number: [id number]:  [id number]       Magnesium [[id number]]  (Normal) Collected: 01/01/21 0208    Specimen: Blood Updated: 01/01/21 0326     Magnesium 2.1 mg/dL     Basic Metabolic Panel [[id number]]  (Abnormal) Collected: 01/01/21 0208    Specimen: Blood Updated: 01/01/21 0326     Glucose 107 mg/dL      BUN 15 mg/dL      Creatinine 1.16 mg/dL      Sodium 136 mmol/L      Potassium 4.0 mmol/L      Comment: Slight hemolysis detected by analyzer. Results may be affected.        Chloride 107 mmol/L      CO2 20.0 mmol/L      Calcium 8.2 mg/dL      eGFR Non African Amer 61 mL/min/1.73      BUN/Creatinine Ratio 12.9     Anion Gap 9.0 mmol/L     Narrative:      GFR Normal >60  Chronic Kidney Disease <60  Kidney Failure <15      CBC Auto Differential [[id number]]  (Abnormal) Collected: 01/01/21 0208    Specimen: Blood Updated: 01/01/21 0253     WBC 11.60 10*3/mm3      RBC 2.82 10*6/mm3      Hemoglobin 9.1 g/dL      Hematocrit 28.2 %      .1 fL      MCH 32.3 pg      MCHC 32.3 g/dL      RDW 17.3 %      RDW-SD 63.0 fl      MPV 8.8 fL      Platelets 175 10*3/mm3      Neutrophil % 76.0 %      Lymphocyte % 7.7 %      Monocyte % 15.6 %      Eosinophil % 0.3 %      Basophil % 0.4 %      Neutrophils, Absolute 8.80 10*3/mm3      Lymphocytes, Absolute 0.90 10*3/mm3      Monocytes, Absolute 1.80 10*3/mm3      Eosinophils, Absolute 0.00 10*3/mm3      Basophils, Absolute 0.00 10*3/mm3      nRBC 0.0 /100 WBC     POC Glucose Once [[id number]]  (Abnormal) Collected: 12/31/20 2107    Specimen: Blood Updated: 12/31/20 2109     Glucose 122 mg/dL      Comment: Serial Number: [id number]:  [id number]       POC Glucose Once [[id number]]  (Abnormal) Collected: 12/31/20 7099    Specimen:  Blood Updated: 12/31/20 1650     Glucose 126 mg/dL      Comment: Serial Number: [id number]:  [id number]           No results found for: HGBA1C            No results found for: LIPASE  No results found for: CHOL, CHLPL, TRIG, HDL, LDL, LDLDIRECT    No results found for: INTRAOP, PREDX, FINALDX, COMDX    Microbiology Results (last 10 days)     Procedure Component Value - Date/Time    COVID-19,CEPHEID,COR/REBEKAH/PAD IN-HOUSE(OR EMERGENT/ADD-ON),NP SWAB IN TRANSPORT MEDIA 3-4 HR TAT - Swab, Nasopharynx [[id number]]  (Normal) Collected: 12/26/20 2149    Lab Status: Final result Specimen: Swab from Nasopharynx Updated: 12/26/20 2248     COVID19 Not Detected    Narrative:      Fact sheet for providers: https://www.fda.gov/media/667062/download     Fact sheet for patients: https://www.fda.gov/media/111811/download          ECG/EMG Results (most recent)     Procedure Component Value Units Date/Time    Adult Transthoracic Echo Complete W/ Cont if Necessary Per Protocol [[id number]] Collected: 12/28/20 1614     Updated: 12/29/20 0959     BSA 2.2 m^2      RVIDd 2.4 cm      IVSd 1.3 cm      LVIDd 4.2 cm      LVIDs 3.3 cm      LVPWd 1.7 cm      IVS/LVPW 0.8     FS 20.9 %      EDV(Teich) 78.2 ml      ESV(Teich) 44.6 ml      EF(Teich) 43.0 %      EDV(cubed) 73.7 ml      ESV(cubed) 36.4 ml      EF(cubed) 50.6 %      LV mass(C)d 253.6 grams      LV mass(C)dI 115.2 grams/m^2      SV(Teich) 33.6 ml      SI(Teich) 15.3 ml/m^2      SV(cubed) 37.2 ml      SI(cubed) 16.9 ml/m^2      Ao root diam 2.9 cm      Ao root area 6.8 cm^2      ACS 2.2 cm      LA dimension 4.8 cm      asc Aorta Diam 3.5 cm      LA/Ao 1.6     LVOT diam 2.1 cm      LVOT area 3.4 cm^2      RVOT diam 2.9 cm      RVOT area 6.4 cm^2      EDV(MOD-sp4) 100.0 ml      ESV(MOD-sp4) 43.9 ml      EF(MOD-sp4) 56.1 %      EDV(MOD-sp2) 92.3 ml      ESV(MOD-sp2) 37.7 ml      EF(MOD-sp2) 59.1 %      SV(MOD-sp4) 56.1 ml      SI(MOD-sp4) 25.5 ml/m^2      SV(MOD-sp2) 54.6 ml       SI(MOD-sp2) 24.8 ml/m^2      Ao root area (BSA corrected) 1.3     LV Pierre Vol (BSA corrected) 45.4 ml/m^2      LV Sys Vol (BSA corrected) 19.9 ml/m^2      Aortic R-R 1.0 sec      Aortic HR 58.8 BPM      MV E max quintin 74.8 cm/sec      MV A max quintin 46.7 cm/sec      MV E/A 1.6     MV V2 max 95.1 cm/sec      MV max PG 3.6 mmHg      MV V2 mean 49.1 cm/sec      MV mean PG 1.1 mmHg      MV V2 VTI 28.8 cm      MVA(VTI) 2.8 cm^2      MV dec slope 235.1 cm/sec^2      MV dec time 0.32 sec      Ao pk quintin 169.2 cm/sec      Ao max PG 11.4 mmHg      Ao max PG (full) 8.0 mmHg      Ao V2 mean 118.7 cm/sec      Ao mean PG 6.3 mmHg      Ao mean PG (full) 4.4 mmHg      Ao V2 VTI 37.9 cm      MINERVA(I,A) 2.1 cm^2      MINERVA(I,D) 2.1 cm^2      MINERVA(V,A) 1.9 cm^2      MINERVA(V,D) 1.9 cm^2      LV V1 max PG 3.5 mmHg      LV V1 mean PG 1.9 mmHg      LV V1 max 93.4 cm/sec      LV V1 mean 65.6 cm/sec      LV V1 VTI 23.2 cm      MR max quintin 469.3 cm/sec      MR max PG 88.1 mmHg      CO(Ao) 15.1 l/min      CI(Ao) 6.9 l/min/m^2      SV(Ao) 256.7 ml      SI(Ao) 116.7 ml/m^2      CO(LVOT) 4.7 l/min      CI(LVOT) 2.1 l/min/m^2      SV(LVOT) 79.9 ml      SV(RVOT) 127.8 ml      SI(LVOT) 36.3 ml/m^2      PA V2 max 94.0 cm/sec      PA max PG 3.5 mmHg      PA max PG (full) 0.9 mmHg      PA V2 mean 71.5 cm/sec      PA mean PG 2.2 mmHg      PA mean PG (full) 0.94 mmHg      PA V2 VTI 23.1 cm      PVA(I,A) 5.5 cm^2      BH CV ECHO SHAJI - PVA(I,D) 5.5 cm^2      BH CV ECHO SHAJI - PVA(V,A) 5.5 cm^2      BH CV ECHO SHAJI - PVA(V,D) 5.5 cm^2      PA acc time 0.14 sec      PI end-d quintin 130.8 cm/sec      PI max quintin 190.4 cm/sec      PI max PG 14.5 mmHg      RV V1 max PG 2.6 mmHg      RV V1 mean PG 1.3 mmHg      RV V1 max 81.1 cm/sec      RV V1 mean 52.3 cm/sec      RV V1 VTI 19.9 cm      TR max quintin 367.8 cm/sec      RVSP(TR) 62.2 mmHg      RAP systole 8.0 mmHg      PA pr(Accel) 15.6 mmHg      Pulm Sys Quintin 52.6 cm/sec      Pulm Pierre Quintin 63.0 cm/sec      Pulm S/D 0.83     Qp/Qs  1.6     Pulm A Revs Dur 0.21 sec      Pulm A Revs Quintin 33.1 cm/sec       CV ECHO SHAJI - BZI_BMI 26.4 kilograms/m^2       CV ECHO SHAJI - BSA(HAYCOCK) 2.2 m^2       CV ECHO SHAJI - BZI_METRIC_WEIGHT 93.4 kg       CV ECHO SHAJI - BZI_METRIC_HEIGHT 188.0 cm      EF(MOD-bp) 57.0 %      LA dimension(2D) 4.5 cm     Narrative:      · Estimated left ventricular EF was in agreement with the calculated left   ventricular EF. Left ventricular ejection fraction appears to be 56 - 60%.   Left ventricular systolic function is normal.  · There is mild, bileaflet mitral valve thickening present.  · Left ventricular wall thickness is consistent with moderate concentric   hypertrophy.  · Severe tricuspid valve regurgitation is present.  · Estimated right ventricular systolic pressure from tricuspid   regurgitation is markedly elevated (>55 mmHg). Calculated right   ventricular systolic pressure from tricuspid regurgitation is 62.2 mmHg.  · Left ventricular diastolic function is consistent with (grade Ia w/high   LAP) impaired relaxation.  · The right ventricular cavity is mild to moderately dilated.       ECG 12 Lead [[id number]] Collected: 12/28/20 0947     Updated: 12/31/20 1606     QT Interval 457 ms     Narrative:      HEART RATE= 59  bpm  RR Interval= 1020  ms  CA Interval= 215  ms  P Horizontal Axis= 47  deg  P Front Axis= 27  deg  QRSD Interval= 132  ms  QT Interval= 457  ms  QRS Axis= -65  deg  T Wave Axis= 73  deg  - ABNORMAL ECG -  Sinus bradycardia  Right bundle branch block  Borderline prolonged CA interval  No previous ECG available for comparison  Electronically Signed By: Zeyad Flores ([facility]) 31-Dec-2020 15:57:05  Date and Time of Study: 2020-12-28 09:47:45               Results for orders placed during the hospital encounter of 12/26/20   Adult Transthoracic Echo Complete W/ Cont if Necessary Per Protocol    Narrative · Estimated left ventricular EF was in agreement with the calculated left   ventricular EF.  Left ventricular ejection fraction appears to be 56 - 60%.   Left ventricular systolic function is normal.  · There is mild, bileaflet mitral valve thickening present.  · Left ventricular wall thickness is consistent with moderate concentric   hypertrophy.  · Severe tricuspid valve regurgitation is present.  · Estimated right ventricular systolic pressure from tricuspid   regurgitation is markedly elevated (>55 mmHg). Calculated right   ventricular systolic pressure from tricuspid regurgitation is 62.2 mmHg.  · Left ventricular diastolic function is consistent with (grade Ia w/high   LAP) impaired relaxation.  · The right ventricular cavity is mild to moderately dilated.          Ct Head Without Contrast    Result Date: 12/29/2020  1. No acute intracranial abnormality. 2. Mild to moderate volume loss and chronic microvascular ischemic changes. Arteriosclerosis. 3. Right MCA territory encephalomalacia consistent with remote insult. Left temporal encephalomalacia consistent with remote insult. Electronically signed by:  Malcolm Rueda M.D.  12/29/2020 11:21 PM    Xr Hip With Or Without Pelvis 2 - 3 View Right    Result Date: 12/29/2020  The patient has a right hip arthroplasty in place. There has been interval placement of a lateral plate and cerclage wires which fixate the comminuted fracture of the proximal right femur as described.  Electronically Signed By-Rivas Ramesh MD On:12/29/2020 8:02 PM This report was finalized on [id number] by  Rivas Ramesh MD.    Xr Hip With Or Without Pelvis 2 - 3 View Right    Result Date: 12/27/2020  There is a displaced periprosthetic fracture involving the proximal right femur as described.  Electronically Signed By-Rivas Ramesh MD On:12/27/2020 9:12 AM This report was finalized on [id number] by  Rivas Ramesh MD.          Xrays, labs reviewed personally by physician.    Medication Review:   I have reviewed the patient's current medication list      Scheduled Meds      Meds  Infusions  No current facility-administered medications for this encounter.       Meds PRN          Assessment/Plan   Assessment/Plan     Active Hospital Problems    Diagnosis  POA   • **Type 2 diabetes mellitus (CMS/[facility]) [E11.9]  Yes   • Paroxysmal atrial fibrillation (CMS/[facility]) [I48.0]  Yes   • Hip fracture (CMS/[facility]) [S72.009A]  Yes   • History of arterial thrombosis [Z86.718]  Not Applicable   • Closed fracture of right hip (CMS/[facility]) [S72.001A]  Yes   • History of traumatic brain injury [Z87.820]  Not Applicable   • Complex partial epilepsy (CMS/[facility]) [G40.209]  Yes   • S/P hip replacement [Z96.649]  Not Applicable   • Essential hypertension [I10]  Yes      Resolved Hospital Problems   No resolved problems to display.       MEDICAL DECISION MAKING COMPLEXITY BY PROBLEM:       Right hip fracture  -reop clearnace by cards    Hx of dvt on eliquis  Sp ivc filter  Hx afib : failed watcheman in the past  Sp dccv this year     History of coronary artery disease status post remote [facility]    TBI history with epilepsy  -Continue , Topamax  - sz precaution    Status post hip and knee replacements     Diabetes mellitus type 2, chronic  -Glucose pending, no labs collected at outlying facility  -A1C  -Hold PO diabetes medications  -Start SSI  -Monitor glucose AC/HS     GERD  -Continue PPI     Essential Hypertension, Chronic, Controlled  Decrease losartan w precautions     RA?  Patient takes methotrexate weekly hold for now  -Continue folic acid     BPH  -Continue tamsulosin       VTE Prophylaxis -   Mechanical Order History:      Ordered        12/26/20 2149  Place Sequential Compression Device  Once         12/26/20 2149  Maintain Sequential Compression Device  Continuous                 Pharmalogical Order History:      Ordered     Dose Route Frequency Stop    12/26/20 2148  apixaban (ELIQUIS) tablet 5 mg  Status:  Discontinued      5 mg PO Every 12 Hours Scheduled 12/27/20 1020                  Code Status -    Code Status and Medical Interventions:   Ordered at: 12/26/20 2149     Code Status:    CPR     Medical Interventions (Level of Support Prior to Arrest):    Full       This patient has been examined wearing appropriate Personal Protective Equipment and discussed with hospital infection control department. 01/01/21        Discharge Planning  rehab        Electronically signed by Jesús Lackey MD, 01/01/21, 16:46 EST.  [facility] Hospitalist Team

## 2021-01-01 NOTE — CONSULTS
Urology Consult Note    Patient:Jason Arceo :1945  Room:[facility]  Admit Date2020  Age:75 y.o.     SEX:male     DOS:2021     MR:[id number]     Visit:[id number]       Attending: Jesús Lackey *  Referring Provider: SAMAN Weir   Reason for Consultation: Incomplete emptying    Patient Care Team:  Ravi Guevara MD as PCP - General (Family Medicine)    Chief complaint I am going ok now    Subjective .     History of present illness:  Pleasant 75 year old male who reports difficulty with urination. Wife states he has seen a urologist in distant past, approximately 20 years ago.  He denies any difficulty with urination in the last several months. While hospitalized he has had some urinary retention and has had to have CIC. He was on Flomax and wife reports this has been increased to 0.8mg daily.  He denies any difficulty with urination this morning. He was able to void spontaneously and his PVR is 38cc.      Review of Systems  Constitutional:  No recent weight gain/loss, fever, chills  Opthalmalogic: No change in vision or blurred vision  Otolaryngeal:  No epistaxis  Cardiovascular: No recent chest pain  Pulmonary:  No cough, sputum production, hemoptysis  Gastrointestinal: No melena, hematochezia, BRBPR, hematemesis  Genitourinary:  See HPI  Hematologic:  +Bruises easily   Musculoskeletal: Hip and back pain   Neurologic:  Np new focal deficits      History  Past Medical History:   Diagnosis Date   • Atherosclerosis of coronary artery 2020   • Atrial fibrillation and flutter (CMS/HCC) 2020   • BPH (benign prostatic hyperplasia) 2020   • Diabetes mellitus (CMS/[facility])    • Gastroesophageal reflux disease 2020   • Heart disease    • Hemorrhage     rectal.    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   • Traumatic brain injury (CMS/[facility]) 2010     Past Surgical History:   Procedure Laterality Date   • CARDIAC SURGERY  2006    triple bypass   • KNEE SURGERY     • TOTAL HIP ARTHROPLASTY Bilateral      Social History     Socioeconomic History   • Marital status:      Spouse name: Not on file   • Number of children: Not on file   • Years of education: Not on file   • Highest education level: Not on file   Tobacco Use   • Smoking status: Never Smoker   Substance and Sexual Activity   • Alcohol use: No   • Drug use: No   • Sexual activity: Defer     Family History   Problem Relation Age of Onset   • Stroke Mother    • Diabetes Father      Allergies   Allergen Reactions   • Benadryl [Diphenhydramine] Other (See Comments)     Cause pt to be hard to waken   • Demerol [Meperidine] Hallucinations   • Dilaudid [Hydromorphone] Hallucinations   • Lacosamide    • Levetiracetam    • Lisinopril    • Morphine Hallucinations   • Oxycodone    • Phenergan [Promethazine Hcl] Other (See Comments)     Due to Brain injury   • Sulfa Antibiotics Other (See Comments)     Was instructed not to mix with methotrexate   • Trazodone      PAINFUL ERECTION     Prior to Admission medications    Medication Sig Start Date End Date Taking? Authorizing Provider   apixaban (ELIQUIS) 5 MG tablet tablet Take 5 mg by mouth 2 (Two) Times a Day.   Yes Tyler Vilchis MD   Apoaequorin (PREVAGEN EXTRA STRENGTH) 20 MG capsule Take  by mouth.   Yes Tyler Vilchis MD   dexlansoprazole (DEXILANT) 60 MG capsule Take 60 mg by mouth Daily.   Yes Tyler Vilchis MD   folic acid (FOLVITE) 1 MG tablet Take 1,000 mcg by mouth Daily. 10/22/18  Yes Tyler Vilchis MD   losartan (COZAAR) 50 MG tablet  10/21/17  Yes Tyler Vilchis MD   metFORMIN (GLUCOPHAGE) 500 MG tablet  1/13/16  Yes Tyler Vilchis MD   methotrexate 2.5 MG tablet  12/4/17  Yes Mame  MD Tyler   Multiple Vitamin (MULTI VITAMIN DAILY PO) Take  by mouth.   Yes Tyler Vilchis MD   Multiple Vitamins-Minerals (ICAPS AREDS 2) capsule Take  by mouth.   Yes Tyler Vilchis MD   pantoprazole (PROTONIX) 40 MG EC tablet  10/21/17  Yes Tyler Vilchis MD   potassium chloride (K-DUR,KLOR-CON) 10 MEQ CR tablet Take 10 mEq by mouth Daily.   Yes Tyler Vilchis MD   tamsulosin (FLOMAX) 0.4 MG capsule 24 hr capsule Take  by mouth. 3/9/15  Yes Tyler Vilchis MD   topiramate (TOPAMAX) 25 MG tablet  17  Yes Tyler Vilchis MD   amoxicillin (AMOXIL) 500 MG capsule TAKE FOUR CAPSULES ONE HOUR BEFORE DENTAL APPOINTMENT. 18   Tyler Vilchis MD   HYDROcodone-acetaminophen (NORCO) 5-325 MG per tablet Take 1 tablet by mouth Every 6 (Six) Hours As Needed for Moderate Pain  for up to 5 days. 20  Jesús Lackey MD   losartan (COZAAR) 25 MG tablet Take 1 tablet by mouth Daily. 21   Jesús Lackey MD   tamsulosin (FLOMAX) 0.4 MG capsule 24 hr capsule Take 2 capsules by mouth Daily for 30 days. 21  Jesús Lackey MD   topiramate (TOPAMAX) 25 MG tablet Take 1 tablet by mouth 2 (two) times a day for 90 days. 4/4/16 7/3/16  Genevieve Joshi MD   topiramate (TOPAMAX) 25 MG tablet Take 1 tablet by mouth 2 (Two) Times a Day for 90 days. 12/31/20 3/31/21  Jesús Lackey MD         Objective     tMax 24 hours:  Temp (24hrs), Av.7 °F (37.1 °C), Min:98.2 °F (36.8 °C), Max:99.6 °F (37.6 °C)    Vital Sign Ranges:  Temp:  [98.2 °F (36.8 °C)-99.6 °F (37.6 °C)] 98.2 °F (36.8 °C)  Heart Rate:  [61-68] 61  Resp:  [15-17] 16  BP: (100-112)/(48-62) 109/62  Intake and Output Last 3 Shifts:  I/O last 3 completed shifts:  In: 2880 [P.O.:2880]  Out: 4325 [Urine:4325]      Physical Exam:     Vital signs: /62 (BP Location: Left arm, Patient Position: Lying)   Pulse 61    "Temp 98.2 °F (36.8 °C) (Oral)   Resp 16   Ht 188 cm (74.02\")   Wt 94.7 kg (208 lb 12.4 oz)   SpO2 98%   BMI 26.79 kg/m²   98%     General: Well developed, well nourished, alert and oriented x 3    Appears stated age. No apparent distress.    HEENT: Moist mucous membranes of the oral mucosa & nasal mucosa.    Lips are not cyanotic.    Extraocular movements intact    Neck:  Trachea position is midline.     Respiratory: Respiratory rhythm & depth: Normal.    Respiratory effort:  Normal.        Cardiac: RRR peripheral pulse     GI:  Nondistended. nontender.    Skin:  Inspection: No rash.    Palpation:  Warm, dry. No induration.     Extremities: No clubbing & no cyanosis.    No edema    :  Phallus and scrotum wnl.           Results Review:     Lab Results (last 24 hours)     Procedure Component Value Units Date/Time    POC Glucose Once [[id number]]  (Abnormal) Collected: 01/01/21 0821    Specimen: Blood Updated: 01/01/21 0822     Glucose 126 mg/dL      Comment: Serial Number: [id number]:  [id number]       Magnesium [[id number]]  (Normal) Collected: 01/01/21 0208    Specimen: Blood Updated: 01/01/21 0326     Magnesium 2.1 mg/dL     Basic Metabolic Panel [[id number]]  (Abnormal) Collected: 01/01/21 0208    Specimen: Blood Updated: 01/01/21 0326     Glucose 107 mg/dL      BUN 15 mg/dL      Creatinine 1.16 mg/dL      Sodium 136 mmol/L      Potassium 4.0 mmol/L      Comment: Slight hemolysis detected by analyzer. Results may be affected.        Chloride 107 mmol/L      CO2 20.0 mmol/L      Calcium 8.2 mg/dL      eGFR Non African Amer 61 mL/min/1.73      BUN/Creatinine Ratio 12.9     Anion Gap 9.0 mmol/L     Narrative:      GFR Normal >60  Chronic Kidney Disease <60  Kidney Failure <15      CBC Auto Differential [[id number]]  (Abnormal) Collected: 01/01/21 0208    Specimen: Blood Updated: 01/01/21 0253     WBC 11.60 10*3/mm3      RBC 2.82 10*6/mm3      Hemoglobin 9.1 g/dL      Hematocrit 28.2 %      .1 fL      " MCH 32.3 pg      MCHC 32.3 g/dL      RDW 17.3 %      RDW-SD 63.0 fl      MPV 8.8 fL      Platelets 175 10*3/mm3      Neutrophil % 76.0 %      Lymphocyte % 7.7 %      Monocyte % 15.6 %      Eosinophil % 0.3 %      Basophil % 0.4 %      Neutrophils, Absolute 8.80 10*3/mm3      Lymphocytes, Absolute 0.90 10*3/mm3      Monocytes, Absolute 1.80 10*3/mm3      Eosinophils, Absolute 0.00 10*3/mm3      Basophils, Absolute 0.00 10*3/mm3      nRBC 0.0 /100 WBC     POC Glucose Once [[id number]]  (Abnormal) Collected: 12/31/20 2107    Specimen: Blood Updated: 12/31/20 2109     Glucose 122 mg/dL      Comment: Serial Number: [id number]:  [id number]       POC Glucose Once [[id number]]  (Abnormal) Collected: 12/31/20 1649    Specimen: Blood Updated: 12/31/20 1650     Glucose 126 mg/dL      Comment: Serial Number: [id number]:  [id number]       POC Glucose Once [[id number]]  (Abnormal) Collected: 12/31/20 1141    Specimen: Blood Updated: 12/31/20 1149     Glucose 129 mg/dL      Comment: Serial Number: [id number]:  [id number]            No results found for: URINECX     Imaging Results (Last 7 Days)     Procedure Component Value Units Date/Time    CT Head Without Contrast [[id number]] Collected: 12/29/20 2318     Updated: 12/30/20 0122    Narrative:      EXAMINATION: CT HEAD WITHOUT CONTRAST    DATE: 12/30/2020 12:26 AM    INDICATION: Postoperative visual disturbance    COMPARISON: None available at the time of this dictation.    TECHNIQUE: Noncontrast imaging obtained from the vertex to the skull base.  CT dose lowering techniques were used, to include: automated exposure control, adjustment for patient size, and or use of iterative reconstruction.?    FINDINGS:    Soft Tissues: No significant soft tissue abnormality.    Skull: No underlying skull fracture or radiopaque foreign body.    Sinuses: Mild to moderate mucosal thickening in the maxillary sinuses. Mucosal thickening within the anterior ethmoid  sinuses.    Mastoids: Mastoid air cells are clear.     Globes and Orbits: Globes and orbits are intact.    Brain: Right MCA territory encephalomalacia favoring a remote insult. Left temporal encephalomalacia favoring a remote insult. No acute hemorrhage evident. No midline shift, masses, or mass effect.  No evidence of acute infarct by noncontrast CT.    Ventricles and Cisterns: Ventricular size and configuration is within normal limits. Basal cisterns are patent. No abnormal extra-axial fluid collection.    Senescent Changes: Mild to moderate volume loss and chronic microvascular ischemic changes. Arteriosclerosis.        Impression:          1. No acute intracranial abnormality.    2. Mild to moderate volume loss and chronic microvascular ischemic changes. Arteriosclerosis.    3. Right MCA territory encephalomalacia consistent with remote insult. Left temporal encephalomalacia consistent with remote insult.        Electronically signed by:  Malcolm Rueda M.D.    12/29/2020 11:21 PM    XR Hip With or Without Pelvis 2 - 3 View Right [[id number]] Collected: 12/29/20 1955     Updated: 12/29/20 2004    Narrative:      DATE OF EXAM:  12/29/2020 7:28 PM     PROCEDURE:  XR HIP W OR WO PELVIS 2-3 VIEW RIGHT-     INDICATIONS:  Periprosthetic fracture, status post open reduction and internal  fixation.     COMPARISON:  12/27/2020.     TECHNIQUE:   AP and Lateral views of the right hip were obtained.     FINDINGS:  The patient has a right hip total arthroplasty in place. There is been  placement of a lateral plate which is fixated via multiple cerclage  wires. There is a comminuted fracture involving the proximal right femur  just below the intertrochanteric region. The fracture fragments are in  near-anatomic alignment for healing. The femoral stem appears to be well  seated in the proximal femoral shaft. The acetabular cup is also  well-seated in the acetabulum. There are expected postsurgical changes  apparent which  includes soft tissue gas and swelling.        Impression:      The patient has a right hip arthroplasty in place. There has been  interval placement of a lateral plate and cerclage wires which fixate  the comminuted fracture of the proximal right femur as described.     Electronically Signed By-Rivas Ramesh MD On:12/29/2020 8:02 PM  This report was finalized on [id number] by  Rivas Ramesh MD.    XR Hip With or Without Pelvis 2 - 3 View Right [[id number]] Collected: 12/27/20 0911     Updated: 12/27/20 0914    Narrative:      DATE OF EXAM:  12/27/2020 8:32 AM     PROCEDURE:  XR HIP W OR WO PELVIS 2-3 VIEW RIGHT-     INDICATIONS:  Presurgical evaluation, right femoral periprosthetic fracture.     COMPARISON:  No Comparisons Available     TECHNIQUE:   AP and Lateral views of the right hip were obtained.     FINDINGS:  The patient has a right hip arthroplasty in place. There is a displaced  periprosthetic fracture adjacent to the lateral aspect of the femoral  stem. The fracture fragment is displaced laterally approximately 5 mm.  The acetabular component of the prosthesis appears intact. The pelvic  bony structures are demineralized. There are degenerative changes of the  visualized lower lumbar spine and right sacroiliac joint.        Impression:      There is a displaced periprosthetic fracture involving the proximal  right femur as described.     Electronically Signed By-Rivas Ramesh MD On:12/27/2020 9:12 AM  This report was finalized on [id number] by  Rivas Ramesh MD.             Inpatient Meds:   Current Meds:       Scheduled Meds:apixaban, 5 mg, Oral, Q12H  folic acid, 1,000 mcg, Oral, Daily  insulin lispro, 0-9 Units, Subcutaneous, TID AC  losartan, 25 mg, Oral, Q24H  melatonin, 5 mg, Oral, Nightly  pantoprazole, 40 mg, Oral, BID AC  polyethylene glycol, 17 g, Oral, Daily  sodium chloride, 10 mL, Intravenous, Q12H  tamsulosin, 0.8 mg, Oral, Daily  topiramate, 50 mg, Oral, Daily       Continuous  Infusions:sodium chloride, 100 mL/hr, Last Rate: 100 mL/hr (12/31/20 5031)       PRN Meds:.•  acetaminophen **OR** acetaminophen **OR** acetaminophen  •  dextrose  •  dextrose  •  fentanyl  •  glucagon (human recombinant)  •  HYDROcodone-acetaminophen  •  influenza vaccine  •  insulin lispro **AND** insulin lispro  •  magnesium sulfate **OR** magnesium sulfate in D5W 1g/100mL (PREMIX)  •  melatonin  •  nitroglycerin  •  ondansetron **OR** ondansetron  •  potassium chloride **OR** potassium chloride **OR** potassium chloride  •  sodium chloride      Impression:      Type 2 diabetes mellitus (CMS/HCC)    Complex partial epilepsy (CMS/HCC)    History of traumatic brain injury    Hip fracture (CMS/HCC)    Essential hypertension    S/P hip replacement    History of arterial thrombosis    Paroxysmal atrial fibrillation (CMS/HCC)    Closed fracture of right hip (CMS/HCC)      Plan:  PVR today was 38cc after voiding spontaneously. Discussed continuing flomax 0.8mg qd with patient and his wife.  Please arrange for outpatient follow up with [facility], [phone] in 1-2 weeks with Dr. Marx.    I discussed the patients findings and my recommendations with patient.    Mary High, APRN  01/01/21  08:49 EST

## 2021-01-01 NOTE — PLAN OF CARE
Goal Outcome Evaluation:  Plan of Care Reviewed With: patient  Progress: no change  Patient stable, ambulating to bathroom with walker and 1 sba, minimal complaints of pain, spouse at bedside, hesitancy noted with urination-to consult with urology

## 2021-01-01 NOTE — DISCHARGE PLACEMENT REQUEST
"Jason Arceo (75 y.o. Male)     Date of Birth Social Security Number Address Home Phone MRN    1945  [address] [place] [address] [phone] [id number]    Faith Marital Status          Taoism        Admission Date Admission Type Admitting Provider Attending Provider Department, Room/Bed    12/26/20 Urgent Jesús Lackey MD Gad, George Fayez Labib Youssief, MD [facility] SURGICAL INPATIENT, 4108/1    Discharge Date Discharge Disposition Discharge Destination         Home-Health Care [facility]              Attending Provider: Jesús Lackey MD    Allergies: Benadryl [Diphenhydramine], Demerol [Meperidine], Dilaudid [Hydromorphone], Lacosamide, Levetiracetam, Lisinopril, Morphine, Oxycodone, Phenergan [Promethazine Hcl], Sulfa Antibiotics, Trazodone    Isolation: None   Infection: None   Code Status: CPR    Ht: 188 cm (74.02\")   Wt: 94.7 kg (208 lb 12.4 oz)    Admission Cmt: None   Principal Problem: Type 2 diabetes mellitus (CMS/[facility]) [E11.9]                 Active Insurance as of 12/26/2020     Primary Coverage     Payor Plan Insurance Group Employer/Plan Group    MEDICARE MEDICARE A & B      Payor Plan Address Payor Plan Phone Number Payor Plan Fax Number Effective Dates    [address] [phone]  5/1/2010 - None Entered    [facility] [id number]       Subscriber Name Subscriber Birth Date Member ID       JASON ARCEO 1945 [id number]           Secondary Coverage     Payor Plan Insurance Group Employer/Plan Group    HUMANA HUMANA  SUP              [id number]     Payor Plan Address Payor Plan Phone Number Payor Plan Fax Number Effective Dates    [address] [address]   8/1/2010 - None Entered    [facility] [id number]       Subscriber Name Subscriber Birth Date Member ID       JASON ARCEO 1945 [id number]                 Emergency Contacts      (Rel.) Home Phone Work Phone Mobile Phone    Debra Arceoyn (Spouse) " [phone] -- [phone]    Franke,Lisa (Daughter) -- -- [phone]

## 2021-01-01 NOTE — PROGRESS NOTES
Discharge Planning Assessment   [facility]     Patient Name: Jason Arceo  MRN: [id number]  Today's Date: 1/1/2021    Admit Date: 12/26/2020          Plan    Plan Comments  per primary nurse patient needs a rolling walker; order placed and per documentation home health is already set up; rolling walker delievered to room       Carol naegele rn  Case management  Office number [phone]  Cell phone [phone]

## 2021-01-02 ENCOUNTER — READMISSION MANAGEMENT (OUTPATIENT)
Dept: CALL CENTER | Facility: HOSPITAL | Age: 76
End: 2021-01-02

## 2021-01-02 NOTE — OUTREACH NOTE
Prep Survey      Responses   [facility] facility patient discharged from?  [facility]   Is LACE score < 7 ?  No   Emergency Room discharge w/ pulse ox?  No   Eligibility  Readm Mgmt   Discharge diagnosis  total hip arthroplasty revision   Does the patient have one of the following disease processes/diagnoses(primary or secondary)?  Total Joint Replacement   Does the patient have Home health ordered?  Yes   What is the Home health agency?   [facility]   Is there a DME ordered?  Yes   What DME was ordered?  RW   Comments regarding appointments  f/u apmts needed   Medication alerts for this patient  see AVS   Prep survey completed?  Yes          Luma Velasquez RN

## 2021-01-04 NOTE — PROGRESS NOTES
Case Management Discharge Note                    Home Medical Care Coordination complete    Service Provider Selected Services Address Phone Fax Patient Preferred    [facility] SERVICES - [facility] [address] [place], [place] [id number] [phone] --                Final Discharge Disposition Code: 06 - home with home health care

## 2021-01-05 ENCOUNTER — READMISSION MANAGEMENT (OUTPATIENT)
Dept: CALL CENTER | Facility: HOSPITAL | Age: 76
End: 2021-01-05

## 2021-01-05 ENCOUNTER — HOSPITAL ENCOUNTER (OUTPATIENT)
Dept: OTHER | Facility: HOSPITAL | Age: 76
Discharge: HOME | End: 2021-01-05
Attending: FAMILY MEDICINE

## 2021-01-05 LAB
ALBUMIN SERPL-MCNC: 3.1 G/DL (ref 3.5–5)
ALBUMIN/GLOB SERPL: 1 {RATIO} (ref 1.4–2.6)
ALP SERPL-CCNC: 130 U/L (ref 56–155)
ALT SERPL-CCNC: 55 U/L (ref 10–40)
ANION GAP SERPL CALC-SCNC: 12 MMOL/L (ref 8–19)
AST SERPL-CCNC: 55 U/L (ref 15–50)
BASOPHILS # BLD AUTO: 0.04 10*3/UL (ref 0–0.2)
BASOPHILS NFR BLD AUTO: 0.5 % (ref 0–3)
BILIRUB SERPL-MCNC: 0.95 MG/DL (ref 0.2–1.3)
BUN SERPL-MCNC: 17 MG/DL (ref 5–25)
BUN/CREAT SERPL: 16 {RATIO} (ref 6–20)
CALCIUM SERPL-MCNC: 9.4 MG/DL (ref 8.7–10.4)
CHLORIDE SERPL-SCNC: 107 MMOL/L (ref 99–111)
CONV ABS IMM GRAN: 0.24 10*3/UL (ref 0–0.2)
CONV CO2: 24 MMOL/L (ref 22–32)
CONV IMMATURE GRAN: 3.1 % (ref 0–1.8)
CONV TOTAL PROTEIN: 6.3 G/DL (ref 6.3–8.2)
CREAT UR-MCNC: 1.08 MG/DL (ref 0.7–1.2)
DEPRECATED RDW RBC AUTO: 59 FL (ref 35.1–43.9)
EOSINOPHIL # BLD AUTO: 0.19 10*3/UL (ref 0–0.7)
EOSINOPHIL # BLD AUTO: 2.4 % (ref 0–7)
ERYTHROCYTE [DISTWIDTH] IN BLOOD BY AUTOMATED COUNT: 16.1 % (ref 11.6–14.4)
GFR SERPLBLD BASED ON 1.73 SQ M-ARVRAT: >60 ML/MIN/{1.73_M2}
GLOBULIN UR ELPH-MCNC: 3.2 G/DL (ref 2–3.5)
GLUCOSE SERPL-MCNC: 104 MG/DL (ref 70–99)
HCT VFR BLD AUTO: 30.3 % (ref 42–52)
HGB BLD-MCNC: 9.5 G/DL (ref 14–18)
LYMPHOCYTES # BLD AUTO: 1.04 10*3/UL (ref 1–5)
LYMPHOCYTES NFR BLD AUTO: 13.2 % (ref 20–45)
MCH RBC QN AUTO: 31.3 PG (ref 27–31)
MCHC RBC AUTO-ENTMCNC: 31.4 G/DL (ref 33–37)
MCV RBC AUTO: 99.7 FL (ref 80–96)
MONOCYTES # BLD AUTO: 0.88 10*3/UL (ref 0.2–1.2)
MONOCYTES NFR BLD AUTO: 11.2 % (ref 3–10)
NEUTROPHILS # BLD AUTO: 5.46 10*3/UL (ref 2–8)
NEUTROPHILS NFR BLD AUTO: 69.6 % (ref 30–85)
NRBC CBCN: 0 % (ref 0–0.7)
OSMOLALITY SERPL CALC.SUM OF ELEC: 290 MOSM/KG (ref 273–304)
PLATELET # BLD AUTO: 351 10*3/UL (ref 130–400)
PMV BLD AUTO: 10.8 FL (ref 9.4–12.4)
POTASSIUM SERPL-SCNC: 3.9 MMOL/L (ref 3.5–5.3)
RBC # BLD AUTO: 3.04 10*6/UL (ref 4.7–6.1)
SODIUM SERPL-SCNC: 139 MMOL/L (ref 135–147)
WBC # BLD AUTO: 7.85 10*3/UL (ref 4.8–10.8)

## 2021-01-05 NOTE — OUTREACH NOTE
Total Joint Week 1 Survey      Responses   [facility] patient discharged from?  [facility]   Does the patient have one of the following disease processes/diagnoses(primary or secondary)?  Total Joint Replacement   Joint surgery performed?  Hip   Week 1 attempt successful?  Yes   Call start time  1609   Call end time  1614   Has the patient been back in either the hospital or Emergency Department since discharge?  No   Is patient permission given to speak with other caregiver?  Yes   List who call center can speak with  Celia   Does the patient have all medications related to this admission filled (includes all antibiotics, pain medications, etc.)  Yes   Is the patient taking all medications as directed (includes completed medication regime)?  Yes   Is the patient able to teach back alternate methods of pain control?  Ice, Reposition, Correct alignment, Short, frequent activity   Comments regarding appointments  Surgeon 1/15   Does the patient have a follow up appointment with their surgeon?  Yes   Has the patient kept scheduled appointments due by today?  N/A   What is the Home health agency?   [facility]   Has home health visited the patient within 72 hours of discharge?  Yes   Has all DME been delivered?  No   Psychosocial issues?  No   When is the first therapy visit scheduled (PO Day) including how many days per week   PT POD 1 12/30   Has the patient began therapy sessions (either in the home or as an out patient)?  Yes   If the patient has started attending therapy, what post op day did they begin to attend (either in home or as an out patient)?    POD 4 at home   Does the patient have a wound vac in place?  No   Has the patient fallen since discharge?  No   Did the patient receive a copy of their discharge instructions?  Yes   Nursing interventions  Reviewed instructions with patient, Educated on MyChart   What is the patient's perception of their functional status since discharge?  Improving   Is the  patient able to teach back signs and symptoms of infection?  Temp >100.4 for 24h or longer, Incisional drainage, Blisters around incision, Increased swelling or redness around incision (not associated with surgical edema), Severe discomfort or pain, Changes in mobility, Shortness of breath or chest pain   Is the patient able to teach back how to prevent infection?  Check incision daily, Wash hands before and after touching incision, Keep incision covered if drainage, Keep incision covered if pets in house, Shower only as directed by surgeon, Monitor blood sugar if diabetic, Eat well-balanced diet, No tub baths, hot tub or swimming, No lotion or creams   Is the patient able to teach back signs and symptoms of DVT?  Redness in calf, Area hot to touch, Shortness of breath or chest pain, Swelling in calf, Severe pain in calf   Is the patient able to teach back home safety measures?  Ability to shower, Accessibility to necessary areas in home   Did the patient implement home safety suggestions from pre-surgery classes if attended?  Yes   If the patient is a current smoker, are they able to teach back resources for cessation?  Not a smoker   Is the patient/caregiver able to teach back the hierarchy of who to call/visit for symptoms/problems? PCP, Specialist, Home health nurse, Urgent Care, ED, 911  Yes   Week 1 call completed?  Yes   Wrap up additional comments  Healing well, pain is rated 6 is uncomfortable taking meds scarcely will not take tylenol , no questions today, no new issues told to medicate before therapy          Guillermina Rosales, RN

## 2021-01-14 ENCOUNTER — HOSPITAL ENCOUNTER (OUTPATIENT)
Dept: OTHER | Facility: HOSPITAL | Age: 76
Discharge: HOME | End: 2021-01-14
Attending: FAMILY MEDICINE

## 2021-01-14 ENCOUNTER — READMISSION MANAGEMENT (OUTPATIENT)
Dept: CALL CENTER | Facility: HOSPITAL | Age: 76
End: 2021-01-14

## 2021-01-14 LAB
BASOPHILS # BLD AUTO: 0.07 10*3/UL (ref 0–0.2)
BASOPHILS NFR BLD AUTO: 0.8 % (ref 0–3)
CONV ABS IMM GRAN: 0.09 10*3/UL (ref 0–0.2)
CONV IMMATURE GRAN: 1 % (ref 0–1.8)
DEPRECATED RDW RBC AUTO: 58.7 FL (ref 35.1–43.9)
EOSINOPHIL # BLD AUTO: 0.13 10*3/UL (ref 0–0.7)
EOSINOPHIL # BLD AUTO: 1.5 % (ref 0–7)
ERYTHROCYTE [DISTWIDTH] IN BLOOD BY AUTOMATED COUNT: 16.1 % (ref 11.6–14.4)
HCT VFR BLD AUTO: 33.1 % (ref 42–52)
HGB BLD-MCNC: 10.4 G/DL (ref 14–18)
LYMPHOCYTES # BLD AUTO: 1.41 10*3/UL (ref 1–5)
LYMPHOCYTES NFR BLD AUTO: 15.7 % (ref 20–45)
MCH RBC QN AUTO: 31.3 PG (ref 27–31)
MCHC RBC AUTO-ENTMCNC: 31.4 G/DL (ref 33–37)
MCV RBC AUTO: 99.7 FL (ref 80–96)
MONOCYTES # BLD AUTO: 0.75 10*3/UL (ref 0.2–1.2)
MONOCYTES NFR BLD AUTO: 8.4 % (ref 3–10)
NEUTROPHILS # BLD AUTO: 6.51 10*3/UL (ref 2–8)
NEUTROPHILS NFR BLD AUTO: 72.6 % (ref 30–85)
NRBC CBCN: 0 % (ref 0–0.7)
PLATELET # BLD AUTO: 347 10*3/UL (ref 130–400)
PMV BLD AUTO: 11 FL (ref 9.4–12.4)
RBC # BLD AUTO: 3.32 10*6/UL (ref 4.7–6.1)
WBC # BLD AUTO: 8.96 10*3/UL (ref 4.8–10.8)

## 2021-01-14 NOTE — OUTREACH NOTE
Total Joint Week 2 Survey      Responses   [facility] patient discharged from?  [facility]   Does the patient have one of the following disease processes/diagnoses(primary or secondary)?  Total Joint Replacement   Joint surgery performed?  Hip   Week 2 attempt successful?  Yes   Call start time  1335   Call end time  1337   Has the patient been back in either the hospital or Emergency Department since discharge?  No   Discharge diagnosis  total hip arthroplasty revision   Is patient permission given to speak with other caregiver?  Yes   List who call center can speak with  Celia   Person spoke with today (if not patient) and relationship  Celia- Wife    Does the patient have all medications related to this admission filled (includes all antibiotics, pain medications, etc.)  Yes   Is the patient taking all medications as directed (includes completed medication regime)?  Yes   Is the patient able to teach back alternate methods of pain control?  Ice, Reposition, Correct alignment, Short, frequent activity   Does the patient have a follow up appointment with their surgeon?  Yes   Has the patient kept scheduled appointments due by today?  N/A   Psychosocial issues?  No   Has the patient began therapy sessions (either in the home or as an out patient)?  Yes   Does the patient have a wound vac in place?  N/A   Has the patient fallen since discharge?  No   Did the patient receive a copy of their discharge instructions?  Yes   Nursing interventions  Reviewed instructions with patient, Educated on MyChart   What is the patient's perception of their functional status since discharge?  Improving   Is the patient able to teach back signs and symptoms of infection?  Temp >100.4 for 24h or longer, Incisional drainage, Blisters around incision, Increased swelling or redness around incision (not associated with surgical edema), Severe discomfort or pain, Changes in mobility, Shortness of breath or chest pain   Is the patient  able to teach back how to prevent infection?  Check incision daily, Wash hands before and after touching incision   Is the patient able to teach back signs and symptoms of DVT?  Redness in calf, Swelling in calf, Area hot to touch, Severe pain in calf, Shortness of breath or chest pain   Is the patient able to teach back home safety measures?  Ability to shower, Accessibility to necessary areas in home   If the patient is a current smoker, are they able to teach back resources for cessation?  Not a smoker   Is the patient/caregiver able to teach back the hierarchy of who to call/visit for symptoms/problems? PCP, Specialist, Home health nurse, Urgent Care, ED, 911  Yes   Week 2 call completed?  Yes          Jenny Loya RN

## 2021-01-21 ENCOUNTER — HOSPITAL ENCOUNTER (OUTPATIENT)
Dept: OTHER | Facility: HOSPITAL | Age: 76
Discharge: HOME | End: 2021-01-21
Attending: INTERNAL MEDICINE

## 2021-01-21 LAB
EST. AVERAGE GLUCOSE BLD GHB EST-MCNC: 103 MG/DL
HBA1C MFR BLD: 5.2 % (ref 3.5–5.7)

## 2021-01-22 LAB
ALT SERPL-CCNC: 18 U/L (ref 10–40)
CHOLEST SERPL-MCNC: 126 MG/DL (ref 107–200)
CHOLEST/HDLC SERPL: 3 {RATIO} (ref 3–6)
CRP SERPL HS-MCNC: 0.84 MG/DL (ref 0–0.5)
HDLC SERPL-MCNC: 42 MG/DL (ref 40–60)
LDLC SERPL CALC-MCNC: 70 MG/DL (ref 70–100)
TRIGL SERPL-MCNC: 70 MG/DL (ref 40–150)
VLDLC SERPL-MCNC: 14 MG/DL (ref 5–37)

## 2021-02-01 ENCOUNTER — READMISSION MANAGEMENT (OUTPATIENT)
Dept: CALL CENTER | Facility: HOSPITAL | Age: 76
End: 2021-02-01

## 2021-02-01 NOTE — OUTREACH NOTE
Total Joint Month 1 Survey      Responses   [facility] patient discharged from?  [facility]   Does the patient have one of the following disease processes/diagnoses(primary or secondary)?  Total Joint Replacement   Joint surgery performed?  Hip   Month 1 attempt successful?  Yes   Call start time  1635   Call end time  1638   Has the patient been back in either the hospital or Emergency Department since discharge?  No   Discharge diagnosis  total hip arthroplasty revision   Person spoke with today (if not patient) and relationship  Celia- Wife    Is the patient taking all medications as directed (includes completed medication regime)?  Yes   Is the patient able to teach back alternate methods of pain control?  Ice, Reposition, Correct alignment, Short, frequent activity   Has the patient kept scheduled appointments due by today?  Yes   Is the patient still receiving Home Health Services?  Yes   Is the patient still attending therapy sessions(either in the home or as an outpatient)?  Yes   Has the patient fallen since discharge?  No   What is the patient's perception of their functional status since discharge?  Improving   Is the patient able to teach back signs and symptoms of infection?  Temp >100.4 for 24h or longer, Incisional drainage, Blisters around incision, Increased swelling or redness around incision (not associated with surgical edema), Severe discomfort or pain, Changes in mobility, Shortness of breath or chest pain   Month 1 call completed?  Yes   Wrap up additional comments  Doing well.  PT has advanced him to a cane today.  Healing well.  No problems or questions at this time.          Leonor Olmedo RN

## 2021-03-08 ENCOUNTER — READMISSION MANAGEMENT (OUTPATIENT)
Dept: CALL CENTER | Facility: HOSPITAL | Age: 76
End: 2021-03-08

## 2021-03-08 NOTE — OUTREACH NOTE
Total Joint Month 2 Survey      Responses   [facility] facility patient discharged from?  [facility]   Does the patient have one of the following disease processes/diagnoses(primary or secondary)?  Total Joint Replacement   Joint surgery performed?  Hip   Month 2 attempt successful?  No   Unsuccessful attempts  Attempt 1          Kelsey Pearl RN

## 2021-03-11 ENCOUNTER — READMISSION MANAGEMENT (OUTPATIENT)
Dept: CALL CENTER | Facility: HOSPITAL | Age: 76
End: 2021-03-11

## 2021-03-11 NOTE — OUTREACH NOTE
Total Joint Month 2 Survey      Responses   [facility] patient discharged from?  [facility]   Does the patient have one of the following disease processes/diagnoses(primary or secondary)?  Total Joint Replacement   Joint surgery performed?  Hip   Month 2 attempt successful?  Yes   Call start time  1708   Call end time  1709   Has the patient been back in either the hospital or Emergency Department since discharge?  No   Discharge diagnosis  total hip arthroplasty revision   Is patient permission given to speak with other caregiver?  Yes   List who call center can speak with  spouse- Celia   Person spoke with today (if not patient) and relationship  spouseGuillermo Yang   Is the patient taking all medications as directed (includes completed medication regime)?  Yes   Is the patient able to teach back alternate methods of pain control?  Ice, Reposition, Correct alignment, Short, frequent activity   Has the patient kept scheduled appointments due by today?  Yes   Is the patient still receiving Home Health Services?  Yes   Is the patient still attending therapy sessions(either in the home or as an outpatient)?  Yes   Has the patient fallen since discharge?  No   What is the patient's perception of their functional status since discharge?  Improving   Is the patient able to teach back signs and symptoms of infection?  Temp >100.4 for 24h or longer, Incisional drainage, Blisters around incision, Increased swelling or redness around incision (not associated with surgical edema), Severe discomfort or pain, Changes in mobility, Shortness of breath or chest pain   Is the patient/caregiver able to teach back the hierarchy of who to call/visit for symptoms/problems? PCP, Specialist, Home health nurse, Urgent Care, ED, 911  Yes   Month 2 Call Completed?  Yes   Wrap up additional comments  Per spouse, patient is doing well, he worked with PT today, no questions or concerns at this time.          Marcie Hodges RN

## 2021-04-14 ENCOUNTER — READMISSION MANAGEMENT (OUTPATIENT)
Dept: CALL CENTER | Facility: HOSPITAL | Age: 76
End: 2021-04-14

## 2021-04-14 NOTE — OUTREACH NOTE
Total Joint Month 3 Survey      Responses   [facility] patient discharged from?  [facility]   Does the patient have one of the following disease processes/diagnoses(primary or secondary)?  Total Joint Replacement   Joint surgery performed?  Hip   Month 3 attempt successful?  Yes   Call start time  0846   Call end time  0851   Has the patient been back in either the hospital or Emergency Department since discharge?  No   Discharge diagnosis  total hip arthroplasty revision   Is patient permission given to speak with other caregiver?  Yes   List who call center can speak with  spouseGuillermo Yang   Person spoke with today (if not patient) and relationship  spouseGuillermo Yang   Is the patient taking all medications as directed (includes completed medication regime)?  Yes   Is the patient able to teach back alternate methods of pain control?  Reposition, Short, frequent activity   Has the patient kept scheduled appointments due by today?  Yes   Is the patient still receiving Home Health Services?  No   Home health comments  HH and PT has ended   Is the patient still attending therapy sessions(either in the home or as an outpatient)?  No   Has the patient fallen since discharge?  No   What is the patient's perception of their functional status since discharge?  Improving   Is the patient able to teach back signs and symptoms of infection?  Increased swelling or redness around incision (not associated with surgical edema), Severe discomfort or pain, Changes in mobility   If the patient is a current smoker, are they able to teach back resources for cessation?  Not a smoker   Is the patient/caregiver able to teach back the hierarchy of who to call/visit for symptoms/problems? PCP, Specialist, Home health nurse, Urgent Care, ED, 911  Yes   Graduated  Yes   Is the patient interested in additional calls from an ambulatory ?  NOTE:  applies to high risk patients requiring additional follow-up.  No   Did the patient feel  the follow up calls were helpful during their recovery period?  Yes   Was the number of calls appropriate?  Yes   Wrap up additional comments  Per spouse patient is doing very well.           Luis Alberto Estrada RN

## 2021-05-06 ENCOUNTER — LAB (OUTPATIENT)
Dept: LAB | Facility: HOSPITAL | Age: 76
End: 2021-05-06

## 2021-05-06 ENCOUNTER — HOSPITAL ENCOUNTER (OUTPATIENT)
Dept: GENERAL RADIOLOGY | Facility: HOSPITAL | Age: 76
Discharge: HOME | End: 2021-05-06

## 2021-05-06 LAB
ABO GROUP BLD: NORMAL
ANION GAP SERPL CALCULATED.3IONS-SCNC: 8.9 MMOL/L (ref 5–15)
APTT PPP: 24.9 SECONDS (ref 24–31)
BASOPHILS # BLD AUTO: 0.04 10*3/MM3 (ref 0–0.2)
BASOPHILS NFR BLD AUTO: 0.6 % (ref 0–1.5)
BILIRUB UR QL STRIP: NEGATIVE
BLD GP AB SCN SERPL QL: NEGATIVE
BUN SERPL-MCNC: 20 MG/DL (ref 8–23)
BUN/CREAT SERPL: 14.8 (ref 7–25)
CALCIUM SPEC-SCNC: 9.4 MG/DL (ref 8.6–10.5)
CHLORIDE SERPL-SCNC: 106 MMOL/L (ref 98–107)
CLARITY UR: CLEAR
CO2 SERPL-SCNC: 25.1 MMOL/L (ref 22–29)
COLOR UR: YELLOW
CREAT SERPL-MCNC: 1.35 MG/DL (ref 0.76–1.27)
DEPRECATED RDW RBC AUTO: 53.3 FL (ref 37–54)
EOSINOPHIL # BLD AUTO: 0.1 10*3/MM3 (ref 0–0.4)
EOSINOPHIL NFR BLD AUTO: 1.6 % (ref 0.3–6.2)
ERYTHROCYTE [DISTWIDTH] IN BLOOD BY AUTOMATED COUNT: 15.2 % (ref 12.3–15.4)
GFR SERPL CREATININE-BSD FRML MDRD: 51 ML/MIN/1.73
GLUCOSE SERPL-MCNC: 92 MG/DL (ref 65–99)
GLUCOSE UR STRIP-MCNC: NEGATIVE MG/DL
HCT VFR BLD AUTO: 37.4 % (ref 37.5–51)
HGB BLD-MCNC: 12.3 G/DL (ref 13–17.7)
HGB UR QL STRIP.AUTO: NEGATIVE
IMM GRANULOCYTES # BLD AUTO: 0.03 10*3/MM3 (ref 0–0.05)
IMM GRANULOCYTES NFR BLD AUTO: 0.5 % (ref 0–0.5)
INR PPP: 1 (ref 0.93–1.1)
KETONES UR QL STRIP: NEGATIVE
LEUKOCYTE ESTERASE UR QL STRIP.AUTO: NEGATIVE
LYMPHOCYTES # BLD AUTO: 1.3 10*3/MM3 (ref 0.7–3.1)
LYMPHOCYTES NFR BLD AUTO: 20.2 % (ref 19.6–45.3)
MCH RBC QN AUTO: 32.8 PG (ref 26.6–33)
MCHC RBC AUTO-ENTMCNC: 32.9 G/DL (ref 31.5–35.7)
MCV RBC AUTO: 99.7 FL (ref 79–97)
MONOCYTES # BLD AUTO: 0.33 10*3/MM3 (ref 0.1–0.9)
MONOCYTES NFR BLD AUTO: 5.1 % (ref 5–12)
MRSA DNA SPEC QL NAA+PROBE: NORMAL
NEUTROPHILS NFR BLD AUTO: 4.65 10*3/MM3 (ref 1.7–7)
NEUTROPHILS NFR BLD AUTO: 72 % (ref 42.7–76)
NITRITE UR QL STRIP: NEGATIVE
NRBC BLD AUTO-RTO: 0 /100 WBC (ref 0–0.2)
PH UR STRIP.AUTO: 8 [PH] (ref 5–8)
PLATELET # BLD AUTO: 243 10*3/MM3 (ref 140–450)
PMV BLD AUTO: 10.1 FL (ref 6–12)
POTASSIUM SERPL-SCNC: 5.2 MMOL/L (ref 3.5–5.2)
PROT UR QL STRIP: NEGATIVE
PROTHROMBIN TIME: 11 SECONDS (ref 9.6–11.7)
RBC # BLD AUTO: 3.75 10*6/MM3 (ref 4.14–5.8)
RH BLD: POSITIVE
SODIUM SERPL-SCNC: 140 MMOL/L (ref 136–145)
SP GR UR STRIP: 1.01 (ref 1–1.03)
T&S EXPIRATION DATE: NORMAL
UROBILINOGEN UR QL STRIP: NORMAL
WBC # BLD AUTO: 6.45 10*3/MM3 (ref 3.4–10.8)

## 2021-05-06 PROCEDURE — 86901 BLOOD TYPING SEROLOGIC RH(D): CPT

## 2021-05-06 PROCEDURE — 85610 PROTHROMBIN TIME: CPT

## 2021-05-06 PROCEDURE — 86900 BLOOD TYPING SEROLOGIC ABO: CPT

## 2021-05-06 PROCEDURE — 71046 X-RAY EXAM CHEST 2 VIEWS: CPT

## 2021-05-06 PROCEDURE — 85025 COMPLETE CBC W/AUTO DIFF WBC: CPT

## 2021-05-06 PROCEDURE — 87641 MR-STAPH DNA AMP PROBE: CPT

## 2021-05-06 PROCEDURE — 86850 RBC ANTIBODY SCREEN: CPT

## 2021-05-06 PROCEDURE — 85730 THROMBOPLASTIN TIME PARTIAL: CPT

## 2021-05-06 PROCEDURE — 80048 BASIC METABOLIC PNL TOTAL CA: CPT

## 2021-05-06 PROCEDURE — 81003 URINALYSIS AUTO W/O SCOPE: CPT

## 2021-05-06 RX ORDER — ASPIRIN 81 MG/1
81 TABLET, CHEWABLE ORAL NIGHTLY
Status: ON HOLD | COMMUNITY
End: 2021-06-14 | Stop reason: SDUPTHER

## 2021-05-06 RX ORDER — AMIODARONE HYDROCHLORIDE 200 MG/1
100 TABLET ORAL 2 TIMES DAILY
COMMUNITY
End: 2021-10-11

## 2021-05-10 ASSESSMENT — KOOS JR
KOOS JR SCORE: 47.487
KOOS JR SCORE: 16

## 2021-05-12 ENCOUNTER — LAB (OUTPATIENT)
Dept: LAB | Facility: HOSPITAL | Age: 76
End: 2021-05-12

## 2021-05-12 LAB — SARS-COV-2 ORF1AB RESP QL NAA+PROBE: NOT DETECTED

## 2021-05-12 PROCEDURE — C9803 HOPD COVID-19 SPEC COLLECT: HCPCS

## 2021-05-12 PROCEDURE — U0005 INFEC AGEN DETEC AMPLI PROBE: HCPCS

## 2021-05-12 PROCEDURE — U0004 COV-19 TEST NON-CDC HGH THRU: HCPCS

## 2021-05-13 ENCOUNTER — ANESTHESIA EVENT (OUTPATIENT)
Dept: PERIOP | Facility: HOSPITAL | Age: 76
End: 2021-05-13

## 2021-05-14 ENCOUNTER — APPOINTMENT (OUTPATIENT)
Dept: GENERAL RADIOLOGY | Facility: HOSPITAL | Age: 76
End: 2021-05-14

## 2021-05-14 ENCOUNTER — HOSPITAL ENCOUNTER (OUTPATIENT)
Facility: HOSPITAL | Age: 76
Discharge: HOME | End: 2021-05-15
Attending: ORTHOPAEDIC SURGERY | Admitting: ORTHOPAEDIC SURGERY

## 2021-05-14 ENCOUNTER — ANESTHESIA (OUTPATIENT)
Dept: PERIOP | Facility: HOSPITAL | Age: 76
End: 2021-05-14

## 2021-05-14 PROBLEM — I27.20 PULMONARY HYPERTENSION: Status: ACTIVE | Noted: 2021-01-04

## 2021-05-14 PROBLEM — Z96.659 TOTAL KNEE REPLACEMENT STATUS: Status: ACTIVE | Noted: 2021-05-14

## 2021-05-14 PROBLEM — I51.7 RIGHT VENTRICULAR DILATION: Status: ACTIVE | Noted: 2021-01-04

## 2021-05-14 LAB
ANION GAP SERPL CALCULATED.3IONS-SCNC: 13 MMOL/L (ref 5–15)
BUN SERPL-MCNC: 15 MG/DL (ref 8–23)
BUN/CREAT SERPL: 11.3 (ref 7–25)
CALCIUM SPEC-SCNC: 9.2 MG/DL (ref 8.6–10.5)
CHLORIDE SERPL-SCNC: 107 MMOL/L (ref 98–107)
CO2 SERPL-SCNC: 22 MMOL/L (ref 22–29)
CREAT SERPL-MCNC: 1.33 MG/DL (ref 0.76–1.27)
DEPRECATED RDW RBC AUTO: 58.6 FL (ref 37–54)
ERYTHROCYTE [DISTWIDTH] IN BLOOD BY AUTOMATED COUNT: 16.6 % (ref 12.3–15.4)
GFR SERPL CREATININE-BSD FRML MDRD: 52 ML/MIN/1.73
GLUCOSE SERPL-MCNC: 201 MG/DL (ref 65–99)
HCT VFR BLD AUTO: 34.1 % (ref 37.5–51)
HGB BLD-MCNC: 11.4 G/DL (ref 13–17.7)
MCH RBC QN AUTO: 33 PG (ref 26.6–33)
MCHC RBC AUTO-ENTMCNC: 33.4 G/DL (ref 31.5–35.7)
MCV RBC AUTO: 98.8 FL (ref 79–97)
PLATELET # BLD AUTO: 194 10*3/MM3 (ref 140–450)
PMV BLD AUTO: 7.9 FL (ref 6–12)
POTASSIUM SERPL-SCNC: 4.1 MMOL/L (ref 3.5–5.2)
RBC # BLD AUTO: 3.45 10*6/MM3 (ref 4.14–5.8)
SODIUM SERPL-SCNC: 142 MMOL/L (ref 136–145)
WBC # BLD AUTO: 9.6 10*3/MM3 (ref 3.4–10.8)

## 2021-05-14 PROCEDURE — C1889 IMPLANT/INSERT DEVICE, NOC: HCPCS | Performed by: ORTHOPAEDIC SURGERY

## 2021-05-14 PROCEDURE — 76942 ECHO GUIDE FOR BIOPSY: CPT | Performed by: ORTHOPAEDIC SURGERY

## 2021-05-14 PROCEDURE — 63710000001 ACETAMINOPHEN 500 MG TABLET: Performed by: ANESTHESIOLOGY

## 2021-05-14 PROCEDURE — 85027 COMPLETE CBC AUTOMATED: CPT | Performed by: ORTHOPAEDIC SURGERY

## 2021-05-14 PROCEDURE — G0378 HOSPITAL OBSERVATION PER HR: HCPCS

## 2021-05-14 PROCEDURE — 80048 BASIC METABOLIC PNL TOTAL CA: CPT | Performed by: ORTHOPAEDIC SURGERY

## 2021-05-14 PROCEDURE — A9270 NON-COVERED ITEM OR SERVICE: HCPCS | Performed by: ORTHOPAEDIC SURGERY

## 2021-05-14 PROCEDURE — 63710000001 AMIODARONE 200 MG TABLET: Performed by: ORTHOPAEDIC SURGERY

## 2021-05-14 PROCEDURE — 25010000002 CEFAZOLIN PER 500 MG: Performed by: ORTHOPAEDIC SURGERY

## 2021-05-14 PROCEDURE — 99213 OFFICE O/P EST LOW 20 MIN: CPT | Performed by: NURSE PRACTITIONER

## 2021-05-14 PROCEDURE — 25010000002 FENTANYL CITRATE (PF) 100 MCG/2ML SOLUTION: Performed by: ANESTHESIOLOGY

## 2021-05-14 PROCEDURE — 63710000001 POVIDONE-IODINE 10 % SOLUTION 118 ML BOTTLE: Performed by: ORTHOPAEDIC SURGERY

## 2021-05-14 PROCEDURE — 25010000002 ONDANSETRON PER 1 MG: Performed by: ANESTHESIOLOGY

## 2021-05-14 PROCEDURE — A9270 NON-COVERED ITEM OR SERVICE: HCPCS | Performed by: ANESTHESIOLOGY

## 2021-05-14 PROCEDURE — 63710000001 ASPIRIN 81 MG CHEWABLE TABLET: Performed by: ORTHOPAEDIC SURGERY

## 2021-05-14 PROCEDURE — C1713 ANCHOR/SCREW BN/BN,TIS/BN: HCPCS | Performed by: ORTHOPAEDIC SURGERY

## 2021-05-14 PROCEDURE — 25010000002 ROPIVACAINE PER 1 MG: Performed by: ANESTHESIOLOGY

## 2021-05-14 PROCEDURE — 73560 X-RAY EXAM OF KNEE 1 OR 2: CPT

## 2021-05-14 PROCEDURE — 25010000002 PROPOFOL 10 MG/ML EMULSION: Performed by: ANESTHESIOLOGY

## 2021-05-14 PROCEDURE — 83735 ASSAY OF MAGNESIUM: CPT | Performed by: NURSE PRACTITIONER

## 2021-05-14 PROCEDURE — 82962 GLUCOSE BLOOD TEST: CPT

## 2021-05-14 PROCEDURE — C1776 JOINT DEVICE (IMPLANTABLE): HCPCS | Performed by: ORTHOPAEDIC SURGERY

## 2021-05-14 PROCEDURE — 25010000002 DEXAMETHASONE PER 1 MG: Performed by: ANESTHESIOLOGY

## 2021-05-14 PROCEDURE — 63710000001 MELOXICAM 15 MG TABLET: Performed by: ORTHOPAEDIC SURGERY

## 2021-05-14 DEVICE — JOURNEY 7.5 ROUND RESURF PAT 35MM STANDARD
Type: IMPLANTABLE DEVICE | Site: KNEE | Status: FUNCTIONAL
Brand: JOURNEY

## 2021-05-14 DEVICE — JOURNEY II BCS XLPE ARTICULAR                                    INSERT SIZE 5-6 RIGHT 9MM
Type: IMPLANTABLE DEVICE | Site: KNEE | Status: FUNCTIONAL
Brand: JOURNEY

## 2021-05-14 DEVICE — JOURNEY TIBIAL BASEPLATE NONPOROUS                                    RT SZ 6
Type: IMPLANTABLE DEVICE | Site: KNEE | Status: FUNCTIONAL
Brand: JOURNEY

## 2021-05-14 DEVICE — IMPLANTABLE DEVICE: Type: IMPLANTABLE DEVICE | Site: KNEE | Status: FUNCTIONAL

## 2021-05-14 DEVICE — DEV WND/CLS CONTRL TISS STRATAFIX SYMM PDS PLS CTX 60CM VIL: Type: IMPLANTABLE DEVICE | Site: KNEE | Status: FUNCTIONAL

## 2021-05-14 DEVICE — DEV CONTRL TISS STRATAFIX SPIRAL PLS PDS SH 2/0 30CM: Type: IMPLANTABLE DEVICE | Site: KNEE | Status: FUNCTIONAL

## 2021-05-14 DEVICE — JOURNEY II BCS FEMORAL OXINIUM                                    RIGHT SIZE 8
Type: IMPLANTABLE DEVICE | Site: KNEE | Status: FUNCTIONAL
Brand: JOURNEY

## 2021-05-14 DEVICE — CMT BONE PALACOS R HI/VISC 1X40: Type: IMPLANTABLE DEVICE | Site: KNEE | Status: FUNCTIONAL

## 2021-05-14 RX ORDER — PROPOFOL 10 MG/ML
VIAL (ML) INTRAVENOUS AS NEEDED
Status: DISCONTINUED | OUTPATIENT
Start: 2021-05-14 | End: 2021-05-14 | Stop reason: SURG

## 2021-05-14 RX ORDER — ACETAMINOPHEN 650 MG/1
650 SUPPOSITORY RECTAL ONCE AS NEEDED
Status: DISCONTINUED | OUTPATIENT
Start: 2021-05-14 | End: 2021-05-14 | Stop reason: HOSPADM

## 2021-05-14 RX ORDER — BUPIVACAINE HYDROCHLORIDE 2.5 MG/ML
INJECTION, SOLUTION EPIDURAL; INFILTRATION; INTRACAUDAL AS NEEDED
Status: DISCONTINUED | OUTPATIENT
Start: 2021-05-14 | End: 2021-05-14 | Stop reason: HOSPADM

## 2021-05-14 RX ORDER — DEXAMETHASONE SODIUM PHOSPHATE 4 MG/ML
INJECTION, SOLUTION INTRA-ARTICULAR; INTRALESIONAL; INTRAMUSCULAR; INTRAVENOUS; SOFT TISSUE
Status: COMPLETED | OUTPATIENT
Start: 2021-05-14 | End: 2021-05-14

## 2021-05-14 RX ORDER — DOCUSATE SODIUM 100 MG/1
100 CAPSULE, LIQUID FILLED ORAL 2 TIMES DAILY PRN
Status: DISCONTINUED | OUTPATIENT
Start: 2021-05-14 | End: 2021-05-15 | Stop reason: HOSPADM

## 2021-05-14 RX ORDER — INSULIN LISPRO 100 [IU]/ML
0-9 INJECTION, SOLUTION INTRAVENOUS; SUBCUTANEOUS
Status: DISCONTINUED | OUTPATIENT
Start: 2021-05-15 | End: 2021-05-15 | Stop reason: HOSPADM

## 2021-05-14 RX ORDER — FENTANYL CITRATE 50 UG/ML
50 INJECTION, SOLUTION INTRAMUSCULAR; INTRAVENOUS
Status: DISCONTINUED | OUTPATIENT
Start: 2021-05-14 | End: 2021-05-14 | Stop reason: HOSPADM

## 2021-05-14 RX ORDER — LOSARTAN POTASSIUM 25 MG/1
25 TABLET ORAL
Status: DISCONTINUED | OUTPATIENT
Start: 2021-05-15 | End: 2021-05-15 | Stop reason: HOSPADM

## 2021-05-14 RX ORDER — EPHEDRINE SULFATE 50 MG/ML
INJECTION INTRAVENOUS AS NEEDED
Status: DISCONTINUED | OUTPATIENT
Start: 2021-05-14 | End: 2021-05-14 | Stop reason: SURG

## 2021-05-14 RX ORDER — MELOXICAM 15 MG/1
15 TABLET ORAL DAILY
Status: DISCONTINUED | OUTPATIENT
Start: 2021-05-14 | End: 2021-05-15 | Stop reason: HOSPADM

## 2021-05-14 RX ORDER — DEXAMETHASONE SODIUM PHOSPHATE 4 MG/ML
INJECTION, SOLUTION INTRA-ARTICULAR; INTRALESIONAL; INTRAMUSCULAR; INTRAVENOUS; SOFT TISSUE AS NEEDED
Status: DISCONTINUED | OUTPATIENT
Start: 2021-05-14 | End: 2021-05-14 | Stop reason: SURG

## 2021-05-14 RX ORDER — SODIUM CHLORIDE 0.9 % (FLUSH) 0.9 %
10 SYRINGE (ML) INJECTION EVERY 12 HOURS SCHEDULED
Status: DISCONTINUED | OUTPATIENT
Start: 2021-05-14 | End: 2021-05-14 | Stop reason: HOSPADM

## 2021-05-14 RX ORDER — ACETAMINOPHEN 325 MG/1
650 TABLET ORAL ONCE AS NEEDED
Status: DISCONTINUED | OUTPATIENT
Start: 2021-05-14 | End: 2021-05-14 | Stop reason: HOSPADM

## 2021-05-14 RX ORDER — SODIUM CHLORIDE 0.9 % (FLUSH) 0.9 %
10 SYRINGE (ML) INJECTION AS NEEDED
Status: DISCONTINUED | OUTPATIENT
Start: 2021-05-14 | End: 2021-05-14 | Stop reason: HOSPADM

## 2021-05-14 RX ORDER — SODIUM CHLORIDE, SODIUM LACTATE, POTASSIUM CHLORIDE, CALCIUM CHLORIDE 600; 310; 30; 20 MG/100ML; MG/100ML; MG/100ML; MG/100ML
9 INJECTION, SOLUTION INTRAVENOUS CONTINUOUS PRN
Status: DISCONTINUED | OUTPATIENT
Start: 2021-05-14 | End: 2021-05-14 | Stop reason: HOSPADM

## 2021-05-14 RX ORDER — MAGNESIUM SULFATE HEPTAHYDRATE 40 MG/ML
2 INJECTION, SOLUTION INTRAVENOUS AS NEEDED
Status: DISCONTINUED | OUTPATIENT
Start: 2021-05-14 | End: 2021-05-15 | Stop reason: HOSPADM

## 2021-05-14 RX ORDER — NALOXONE HCL 0.4 MG/ML
0.1 VIAL (ML) INJECTION
Status: DISCONTINUED | OUTPATIENT
Start: 2021-05-14 | End: 2021-05-15 | Stop reason: HOSPADM

## 2021-05-14 RX ORDER — TRANEXAMIC ACID 10 MG/ML
1000 INJECTION, SOLUTION INTRAVENOUS ONCE
Status: COMPLETED | OUTPATIENT
Start: 2021-05-14 | End: 2021-05-14

## 2021-05-14 RX ORDER — MAGNESIUM SULFATE 1 G/100ML
1 INJECTION INTRAVENOUS AS NEEDED
Status: DISCONTINUED | OUTPATIENT
Start: 2021-05-14 | End: 2021-05-15 | Stop reason: HOSPADM

## 2021-05-14 RX ORDER — FENTANYL CITRATE 50 UG/ML
INJECTION, SOLUTION INTRAMUSCULAR; INTRAVENOUS AS NEEDED
Status: DISCONTINUED | OUTPATIENT
Start: 2021-05-14 | End: 2021-05-14 | Stop reason: SURG

## 2021-05-14 RX ORDER — ONDANSETRON 2 MG/ML
4 INJECTION INTRAMUSCULAR; INTRAVENOUS EVERY 6 HOURS PRN
Status: DISCONTINUED | OUTPATIENT
Start: 2021-05-14 | End: 2021-05-15 | Stop reason: HOSPADM

## 2021-05-14 RX ORDER — HYDROMORPHONE HCL 110MG/55ML
1 PATIENT CONTROLLED ANALGESIA SYRINGE INTRAVENOUS EVERY 4 HOURS PRN
Status: DISCONTINUED | OUTPATIENT
Start: 2021-05-14 | End: 2021-05-15 | Stop reason: HOSPADM

## 2021-05-14 RX ORDER — NICOTINE POLACRILEX 4 MG
15 LOZENGE BUCCAL
Status: DISCONTINUED | OUTPATIENT
Start: 2021-05-14 | End: 2021-05-15 | Stop reason: HOSPADM

## 2021-05-14 RX ORDER — TOPIRAMATE 25 MG/1
50 TABLET ORAL DAILY
Status: DISCONTINUED | OUTPATIENT
Start: 2021-05-15 | End: 2021-05-15 | Stop reason: HOSPADM

## 2021-05-14 RX ORDER — SODIUM CHLORIDE 9 MG/ML
100 INJECTION, SOLUTION INTRAVENOUS CONTINUOUS
Status: DISCONTINUED | OUTPATIENT
Start: 2021-05-14 | End: 2021-05-15 | Stop reason: HOSPADM

## 2021-05-14 RX ORDER — POTASSIUM CHLORIDE 750 MG/1
10 TABLET, FILM COATED, EXTENDED RELEASE ORAL EVERY MORNING
Status: DISCONTINUED | OUTPATIENT
Start: 2021-05-15 | End: 2021-05-15 | Stop reason: HOSPADM

## 2021-05-14 RX ORDER — ASPIRIN 81 MG/1
81 TABLET, CHEWABLE ORAL DAILY
Status: DISCONTINUED | OUTPATIENT
Start: 2021-05-14 | End: 2021-05-15 | Stop reason: HOSPADM

## 2021-05-14 RX ORDER — SODIUM CHLORIDE, SODIUM LACTATE, POTASSIUM CHLORIDE, CALCIUM CHLORIDE 600; 310; 30; 20 MG/100ML; MG/100ML; MG/100ML; MG/100ML
1000 INJECTION, SOLUTION INTRAVENOUS CONTINUOUS
Status: DISCONTINUED | OUTPATIENT
Start: 2021-05-14 | End: 2021-05-14

## 2021-05-14 RX ORDER — ONDANSETRON 2 MG/ML
INJECTION INTRAMUSCULAR; INTRAVENOUS AS NEEDED
Status: DISCONTINUED | OUTPATIENT
Start: 2021-05-14 | End: 2021-05-14 | Stop reason: SURG

## 2021-05-14 RX ORDER — LIDOCAINE HYDROCHLORIDE 20 MG/ML
INJECTION, SOLUTION EPIDURAL; INFILTRATION; INTRACAUDAL; PERINEURAL AS NEEDED
Status: DISCONTINUED | OUTPATIENT
Start: 2021-05-14 | End: 2021-05-14 | Stop reason: SURG

## 2021-05-14 RX ORDER — PANTOPRAZOLE SODIUM 40 MG/1
40 TABLET, DELAYED RELEASE ORAL EVERY MORNING
Status: DISCONTINUED | OUTPATIENT
Start: 2021-05-15 | End: 2021-05-15 | Stop reason: HOSPADM

## 2021-05-14 RX ORDER — ONDANSETRON 2 MG/ML
4 INJECTION INTRAMUSCULAR; INTRAVENOUS ONCE AS NEEDED
Status: DISCONTINUED | OUTPATIENT
Start: 2021-05-14 | End: 2021-05-14 | Stop reason: HOSPADM

## 2021-05-14 RX ORDER — ACETAMINOPHEN 500 MG
TABLET ORAL AS NEEDED
Status: DISCONTINUED | OUTPATIENT
Start: 2021-05-14 | End: 2021-05-14 | Stop reason: SURG

## 2021-05-14 RX ORDER — POTASSIUM CHLORIDE 20 MEQ/1
40 TABLET, EXTENDED RELEASE ORAL AS NEEDED
Status: DISCONTINUED | OUTPATIENT
Start: 2021-05-14 | End: 2021-05-15 | Stop reason: HOSPADM

## 2021-05-14 RX ORDER — DEXTROSE MONOHYDRATE 25 G/50ML
25 INJECTION, SOLUTION INTRAVENOUS
Status: DISCONTINUED | OUTPATIENT
Start: 2021-05-14 | End: 2021-05-15 | Stop reason: HOSPADM

## 2021-05-14 RX ORDER — INSULIN LISPRO 100 [IU]/ML
0-9 INJECTION, SOLUTION INTRAVENOUS; SUBCUTANEOUS AS NEEDED
Status: DISCONTINUED | OUTPATIENT
Start: 2021-05-14 | End: 2021-05-15 | Stop reason: HOSPADM

## 2021-05-14 RX ORDER — ROPIVACAINE HYDROCHLORIDE 5 MG/ML
INJECTION, SOLUTION EPIDURAL; INFILTRATION; PERINEURAL
Status: COMPLETED | OUTPATIENT
Start: 2021-05-14 | End: 2021-05-14

## 2021-05-14 RX ORDER — AMIODARONE HYDROCHLORIDE 200 MG/1
200 TABLET ORAL 2 TIMES DAILY
Status: DISCONTINUED | OUTPATIENT
Start: 2021-05-14 | End: 2021-05-15 | Stop reason: HOSPADM

## 2021-05-14 RX ORDER — HYDROCODONE BITARTRATE AND ACETAMINOPHEN 7.5; 325 MG/1; MG/1
2 TABLET ORAL EVERY 4 HOURS PRN
Status: DISCONTINUED | OUTPATIENT
Start: 2021-05-14 | End: 2021-05-15 | Stop reason: HOSPADM

## 2021-05-14 RX ORDER — HYDROCODONE BITARTRATE AND ACETAMINOPHEN 7.5; 325 MG/1; MG/1
1 TABLET ORAL EVERY 4 HOURS PRN
Status: DISCONTINUED | OUTPATIENT
Start: 2021-05-14 | End: 2021-05-15 | Stop reason: HOSPADM

## 2021-05-14 RX ORDER — LIDOCAINE HYDROCHLORIDE 10 MG/ML
0.5 INJECTION, SOLUTION INFILTRATION; PERINEURAL ONCE AS NEEDED
Status: DISCONTINUED | OUTPATIENT
Start: 2021-05-14 | End: 2021-05-14 | Stop reason: HOSPADM

## 2021-05-14 RX ORDER — ONDANSETRON 4 MG/1
4 TABLET, FILM COATED ORAL EVERY 6 HOURS PRN
Status: DISCONTINUED | OUTPATIENT
Start: 2021-05-14 | End: 2021-05-15 | Stop reason: HOSPADM

## 2021-05-14 RX ADMIN — ROPIVACAINE HYDROCHLORIDE 30 ML: 5 INJECTION, SOLUTION EPIDURAL; INFILTRATION; PERINEURAL at 15:07

## 2021-05-14 RX ADMIN — ONDANSETRON 4 MG: 2 INJECTION INTRAMUSCULAR; INTRAVENOUS at 16:45

## 2021-05-14 RX ADMIN — DEXAMETHASONE SODIUM PHOSPHATE 4 MG: 4 INJECTION, SOLUTION INTRAMUSCULAR; INTRAVENOUS at 15:07

## 2021-05-14 RX ADMIN — LIDOCAINE HYDROCHLORIDE 100 MG: 20 INJECTION, SOLUTION EPIDURAL; INFILTRATION; INTRACAUDAL; PERINEURAL at 15:40

## 2021-05-14 RX ADMIN — TRANEXAMIC ACID 1000 MG: 10 INJECTION, SOLUTION INTRAVENOUS at 15:40

## 2021-05-14 RX ADMIN — PROPOFOL 200 MG: 10 INJECTION, EMULSION INTRAVENOUS at 15:45

## 2021-05-14 RX ADMIN — FENTANYL CITRATE 50 MCG: 50 INJECTION, SOLUTION INTRAMUSCULAR; INTRAVENOUS at 15:58

## 2021-05-14 RX ADMIN — SODIUM CHLORIDE, SODIUM LACTATE, POTASSIUM CHLORIDE, AND CALCIUM CHLORIDE: .6; .31; .03; .02 INJECTION, SOLUTION INTRAVENOUS at 15:36

## 2021-05-14 RX ADMIN — PROPOFOL 30 MG: 10 INJECTION, EMULSION INTRAVENOUS at 16:10

## 2021-05-14 RX ADMIN — FENTANYL CITRATE 25 MCG: 50 INJECTION, SOLUTION INTRAMUSCULAR; INTRAVENOUS at 16:30

## 2021-05-14 RX ADMIN — FENTANYL CITRATE 50 MCG: 50 INJECTION, SOLUTION INTRAMUSCULAR; INTRAVENOUS at 16:49

## 2021-05-14 RX ADMIN — ACETAMINOPHEN 1000 MG: 500 TABLET, FILM COATED ORAL at 15:02

## 2021-05-14 RX ADMIN — CEFAZOLIN SODIUM 2 G: 10 INJECTION, POWDER, FOR SOLUTION INTRAVENOUS at 22:22

## 2021-05-14 RX ADMIN — ASPIRIN 81 MG CHEWABLE TABLET 81 MG: 81 TABLET CHEWABLE at 20:29

## 2021-05-14 RX ADMIN — EPHEDRINE SULFATE 10 MG: 50 INJECTION INTRAVENOUS at 16:40

## 2021-05-14 RX ADMIN — FENTANYL CITRATE 50 MCG: 50 INJECTION, SOLUTION INTRAMUSCULAR; INTRAVENOUS at 15:40

## 2021-05-14 RX ADMIN — MELOXICAM 15 MG: 15 TABLET ORAL at 20:29

## 2021-05-14 RX ADMIN — DEXAMETHASONE SODIUM PHOSPHATE 8 MG: 4 INJECTION, SOLUTION INTRAMUSCULAR; INTRAVENOUS at 16:08

## 2021-05-14 RX ADMIN — SODIUM CHLORIDE 100 ML/HR: 9 INJECTION, SOLUTION INTRAVENOUS at 19:33

## 2021-05-14 RX ADMIN — PROPOFOL 30 MG: 10 INJECTION, EMULSION INTRAVENOUS at 16:16

## 2021-05-14 RX ADMIN — EPHEDRINE SULFATE 10 MG: 50 INJECTION INTRAVENOUS at 16:47

## 2021-05-14 RX ADMIN — PROPOFOL 200 MG: 10 INJECTION, EMULSION INTRAVENOUS at 15:36

## 2021-05-14 RX ADMIN — PROPOFOL 150 MCG/KG/MIN: 10 INJECTION, EMULSION INTRAVENOUS at 16:24

## 2021-05-14 RX ADMIN — FENTANYL CITRATE 25 MCG: 50 INJECTION, SOLUTION INTRAMUSCULAR; INTRAVENOUS at 16:40

## 2021-05-14 RX ADMIN — AMIODARONE HYDROCHLORIDE 200 MG: 200 TABLET ORAL at 20:29

## 2021-05-14 RX ADMIN — PROPOFOL 125 MCG/KG/MIN: 10 INJECTION, EMULSION INTRAVENOUS at 15:37

## 2021-05-14 RX ADMIN — CEFAZOLIN SODIUM 2 G: 1 INJECTION, POWDER, FOR SOLUTION INTRAMUSCULAR; INTRAVENOUS at 15:46

## 2021-05-14 NOTE — ANESTHESIA POSTPROCEDURE EVALUATION
Patient: Jason Arceo    Procedure Summary     Date: 05/14/21 Room / Location: [facility] OR 12 / [facility] MAIN OR    Anesthesia Start: 1536 Anesthesia Stop: 1704    Procedure: TOTAL KNEE ARTHROPLASTY (Right Knee) Diagnosis:       Knee osteoarthritis      (Knee osteoarthritis [M17.10])    Surgeons: Damian Quijano II, MD Provider: Isabel Mojica MD    Anesthesia Type: general with block ASA Status: 4          Anesthesia Type: general with block    Vitals  Vitals Value Taken Time   /46 05/14/21 1719   Temp 97.7 °F (36.5 °C) 05/14/21 1704   Pulse 60 05/14/21 1719   Resp 14 05/14/21 1719   SpO2 95 % 05/14/21 1719           Post Anesthesia Care and Evaluation    Patient location during evaluation: PACU  Patient participation: complete - patient participated  Level of consciousness: awake  Pain management: adequate  Airway patency: patent  Anesthetic complications: No anesthetic complications  PONV Status: controlled  Cardiovascular status: acceptable  Respiratory status: acceptable  Hydration status: acceptable

## 2021-05-14 NOTE — ANESTHESIA PROCEDURE NOTES
Airway  Urgency: elective    Date/Time: 5/14/2021 3:42 PM  Airway not difficult    General Information and Staff    Patient location during procedure: OR  Anesthesiologist: Isabel Mojica MD    Indications and Patient Condition  Indications for airway management: airway protection    Preoxygenated: yes  MILS maintained throughout  Mask difficulty assessment: 1 - vent by mask    Final Airway Details  Final airway type: supraglottic airway      Successful airway: Size 4    Number of attempts at approach: 1  Assessment: lips, teeth, and gum same as pre-op and atraumatic intubation

## 2021-05-14 NOTE — ANESTHESIA PROCEDURE NOTES
Peripheral Block    Pre-sedation assessment completed: 5/14/2021 3:00 PM    Patient reassessed immediately prior to procedure    Patient location during procedure: pre-op  Reason for block: procedure for pain, at surgeon's request, post-op pain management and secondary anesthetic  Performed by  Anesthesiologist: Todd Padilla MD  Preanesthetic Checklist  Completed: patient identified, IV checked, site marked, risks and benefits discussed, surgical consent, monitors and equipment checked, pre-op evaluation and timeout performed  Prep:  Pt Position: sitting  Sterile barriers:cap, gloves, mask and washed/disinfected hands  Prep: ChloraPrep  Patient monitoring: blood pressure monitoring, continuous pulse oximetry and EKG  Procedure  Sedation:no  Performed under: local infiltration  Guidance:ultrasound guided and landmark technique  ULTRASOUND INTERPRETATION.  Using ultrasound guidance a 20 G gauge needle was placed in close proximity to the femoral nerve, at which point, under ultrasound guidance anesthetic was injected in the area of the nerve and spread of the anesthesia was seen on ultrasound in close proximity thereto.  There were no abnormalities seen on ultrasound; a digital image was taken; and the patient tolerated the procedure with no complications. Images:still images obtained, printed/placed on chart    Laterality:right  Block Type:adductor canal block  Injection Technique:single-shot  Needle Type:echogenic  Needle Gauge:20 G  Resistance on Injection: less than 15 psi    Medications Used: dexamethasone (DECADRON) injection, 4 mg  ropivacaine (NAROPIN) 0.5 % injection, 30 mL  Med admintered at 5/14/2021 3:07 PM      Post Assessment  Injection Assessment: negative aspiration for heme, no paresthesia on injection and incremental injection  Patient Tolerance:comfortable throughout block  Complications:no  Additional Notes  Pre-procedure:  Peripheral nerve block performed preoperatively prior to the start  of anesthesia time at the request of the patient and the surgeon for the management of postoperative acute surgical pain as well as for a secondary intraoperative anesthetic (general anesthesia is the primary intraoperative anesthetic); patient identified; pre-procedure vital signs, all relevant labs/studies, complete medical/surgical/anesthetic history, full medication list, full allergy list, and NPO status obtained/reviewed; physical assessment performed; anesthetic options, side effects, potential complications, risks, and benefits discussed; questions answered; patient wishes to proceed with the procedure; written anesthesia procedure consent obtained; patient cleared for procedure; time out performed; IV access in situ    Procedure:  ASA monitor placed; supplemental oxygen provided; patient positioned; hand hygiene performed; sterile technique maintained throughout the procedure; sterile prep applied; insertion site determined by anatomical landmarks, palpation, and ultrasound imaging; live ultrasound guidance throughout the procedure; target nerves/landmarks identified on live ultrasound; skin and subcutaneous tissues numbed by injection of 1% lidocaine; 4 inch 20G StimupPlayEnable Ultra 360 Insulated Echogenic Needle used; realtime needle advancement and placement near the target nerves witnessed on live ultrasound; negative aspiration prior to injection; correct needle placement confirmed on live ultrasound by local anesthetic spread around the target nerves; local anesthetic mixture injected with negative aspiration prior to each injection and after each 1-5 mL injected; needle withdrawn; dressing applied; ultrasound image printed and placed in the patient's permanent medical record    Post-procedure:  Peripheral nerve block placed successfully; good block; no apparent complications; minimal estimated blood loss; vital signs stable throughout; see nurse's notes for vitals; transported to the OR, general  anesthesia induced, and surgery started

## 2021-05-14 NOTE — ANESTHESIA PREPROCEDURE EVALUATION
Anesthesia Evaluation     Patient summary reviewed and Nursing notes reviewed   no history of anesthetic complications:  NPO Solid Status: > 8 hours  NPO Liquid Status: > 8 hours           Airway   Dental      Pulmonary    Cardiovascular     ECG reviewed  PT is on anticoagulation therapy    (+) hypertension, valvular problems/murmurs TI, past MI , CAD, dysrhythmias Atrial Fib, Atrial Flutter, PVD,       Neuro/Psych  (+) seizures, numbness, psychiatric history Anxiety,     GI/Hepatic/Renal/Endo    (+)  GERD, GI bleeding , diabetes mellitus,     Musculoskeletal     (+) back pain, chronic pain,   Abdominal    Substance History      OB/GYN          Other   arthritis, autoimmune disease rheumatoid arthritis, blood dyscrasia anemia,     ROS/Med Hx Other: Cardiac clearance, traumatic brain injury, BPH, arterial thrombosis (iliac), panic d/o, confusion    epilepsy    Echocardiogram Findings    Left Ventricle Calculated left ventricular EF = 57% Estimated left ventricular EF was in agreement with the calculated left ventricular EF. Left ventricular ejection fraction appears to be 56 - 60%. Left ventricular systolic function is normal.   Left ventricular wall thickness is consistent with moderate concentric hypertrophy. Left ventricular diastolic function is consistent with (grade Ia w/high LAP) impaired relaxation.  Right Ventricle The right ventricular cavity is mild to moderately dilated. Normal right ventricular systolic function noted.  Left Atrium The left atrial cavity is moderately dilated.  Right Atrium Right atrium not well visualized.  Aortic Valve The aortic valve is structurally normal with no regurgitation or stenosis present.  Mitral Valve There is mild, bileaflet mitral valve thickening present. Mild mitral valve regurgitation is present.  Tricuspid Valve The tricuspid valve annulus appears mildly dilated. Severe tricuspid valve regurgitation is present. Estimated right ventricular systolic pressure from  tricuspid regurgitation is markedly elevated (>55 mmHg). Calculated right ventricular systolic pressure from tricuspid regurgitation is 62.2 mmHg.  Pulmonic Valve The pulmonic valve is grossly normal in structure. There is mild pulmonic valve regurgitation present.  Greater Vessels No dilation of the aortic root is present. The pulmonary artery not well visualized.  Pericardium There is no evidence of pericardial effusion. .    PSH  TOTAL HIP ARTHROPLASTY KNEE SURGERY  CARDIAC SURGERY TOTAL HIP ARTHROPLASTY REVISION  CARDIOVERSION                 Anesthesia Plan    ASA 4     general with block   total IV anesthesia(Patient identified; pre-operative vital signs, all relevant labs/studies, complete medical/surgical/anesthetic history, full medication list, full allergy list, and NPO status obtained/reviewed; physical assessment performed; anesthetic options, side effects, potential complications, risks, and benefits discussed; questions answered; written anesthesia consent obtained; patient cleared for procedure; anesthesia machine and equipment checked and functioning    ERAS)  intravenous induction     Anesthetic plan, all risks, benefits, and alternatives have been provided, discussed and informed consent has been obtained with: patient.    Plan discussed with CRNA and CAA.

## 2021-05-15 VITALS
HEART RATE: 62 BPM | TEMPERATURE: 97.4 F | DIASTOLIC BLOOD PRESSURE: 63 MMHG | SYSTOLIC BLOOD PRESSURE: 114 MMHG | BODY MASS INDEX: 29.03 KG/M2 | RESPIRATION RATE: 15 BRPM | WEIGHT: 226.19 LBS | HEIGHT: 74 IN | OXYGEN SATURATION: 98 %

## 2021-05-15 LAB
ALBUMIN SERPL-MCNC: 3.7 G/DL (ref 3.5–5.2)
ALBUMIN/GLOB SERPL: 1.5 G/DL
ALP SERPL-CCNC: 82 U/L (ref 39–117)
ALT SERPL W P-5'-P-CCNC: 18 U/L (ref 1–41)
ANION GAP SERPL CALCULATED.3IONS-SCNC: 11 MMOL/L (ref 5–15)
AST SERPL-CCNC: 19 U/L (ref 1–40)
BASOPHILS # BLD AUTO: 0 10*3/MM3 (ref 0–0.2)
BASOPHILS NFR BLD AUTO: 0.1 % (ref 0–1.5)
BILIRUB SERPL-MCNC: 0.4 MG/DL (ref 0–1.2)
BUN SERPL-MCNC: 16 MG/DL (ref 8–23)
BUN/CREAT SERPL: 12.2 (ref 7–25)
CALCIUM SPEC-SCNC: 9.1 MG/DL (ref 8.6–10.5)
CHLORIDE SERPL-SCNC: 108 MMOL/L (ref 98–107)
CO2 SERPL-SCNC: 22 MMOL/L (ref 22–29)
CREAT SERPL-MCNC: 1.31 MG/DL (ref 0.76–1.27)
DEPRECATED RDW RBC AUTO: 57.8 FL (ref 37–54)
EOSINOPHIL # BLD AUTO: 0 10*3/MM3 (ref 0–0.4)
EOSINOPHIL NFR BLD AUTO: 0 % (ref 0.3–6.2)
ERYTHROCYTE [DISTWIDTH] IN BLOOD BY AUTOMATED COUNT: 16.5 % (ref 12.3–15.4)
GFR SERPL CREATININE-BSD FRML MDRD: 53 ML/MIN/1.73
GLOBULIN UR ELPH-MCNC: 2.4 GM/DL
GLUCOSE SERPL-MCNC: 144 MG/DL (ref 65–99)
HCT VFR BLD AUTO: 31.6 % (ref 37.5–51)
HGB BLD-MCNC: 10.6 G/DL (ref 13–17.7)
LYMPHOCYTES # BLD AUTO: 0.5 10*3/MM3 (ref 0.7–3.1)
LYMPHOCYTES NFR BLD AUTO: 5 % (ref 19.6–45.3)
MAGNESIUM SERPL-MCNC: 2 MG/DL (ref 1.6–2.4)
MCH RBC QN AUTO: 33.2 PG (ref 26.6–33)
MCHC RBC AUTO-ENTMCNC: 33.5 G/DL (ref 31.5–35.7)
MCV RBC AUTO: 98.9 FL (ref 79–97)
MONOCYTES # BLD AUTO: 0.4 10*3/MM3 (ref 0.1–0.9)
MONOCYTES NFR BLD AUTO: 3.5 % (ref 5–12)
NEUTROPHILS NFR BLD AUTO: 9.9 10*3/MM3 (ref 1.7–7)
NEUTROPHILS NFR BLD AUTO: 91.4 % (ref 42.7–76)
NRBC BLD AUTO-RTO: 0.1 /100 WBC (ref 0–0.2)
PLATELET # BLD AUTO: 180 10*3/MM3 (ref 140–450)
PMV BLD AUTO: 8 FL (ref 6–12)
POTASSIUM SERPL-SCNC: 4.5 MMOL/L (ref 3.5–5.2)
PROT SERPL-MCNC: 6.1 G/DL (ref 6–8.5)
RBC # BLD AUTO: 3.2 10*6/MM3 (ref 4.14–5.8)
SODIUM SERPL-SCNC: 141 MMOL/L (ref 136–145)
WBC # BLD AUTO: 10.9 10*3/MM3 (ref 3.4–10.8)

## 2021-05-15 PROCEDURE — 82962 GLUCOSE BLOOD TEST: CPT

## 2021-05-15 PROCEDURE — 99224 PR SBSQ OBSERVATION CARE/DAY 15 MINUTES: CPT | Performed by: HOSPITALIST

## 2021-05-15 PROCEDURE — 85025 COMPLETE CBC W/AUTO DIFF WBC: CPT | Performed by: NURSE PRACTITIONER

## 2021-05-15 PROCEDURE — 97116 GAIT TRAINING THERAPY: CPT

## 2021-05-15 PROCEDURE — 63710000001 LOSARTAN 25 MG TABLET: Performed by: ORTHOPAEDIC SURGERY

## 2021-05-15 PROCEDURE — 80053 COMPREHEN METABOLIC PANEL: CPT | Performed by: NURSE PRACTITIONER

## 2021-05-15 PROCEDURE — 63710000001 PANTOPRAZOLE 40 MG TABLET DELAYED-RELEASE: Performed by: NURSE PRACTITIONER

## 2021-05-15 PROCEDURE — A9270 NON-COVERED ITEM OR SERVICE: HCPCS | Performed by: ORTHOPAEDIC SURGERY

## 2021-05-15 PROCEDURE — G0378 HOSPITAL OBSERVATION PER HR: HCPCS

## 2021-05-15 PROCEDURE — 25010000002 CEFAZOLIN PER 500 MG: Performed by: ORTHOPAEDIC SURGERY

## 2021-05-15 PROCEDURE — 63710000001 AMIODARONE 200 MG TABLET: Performed by: ORTHOPAEDIC SURGERY

## 2021-05-15 PROCEDURE — A9270 NON-COVERED ITEM OR SERVICE: HCPCS | Performed by: NURSE PRACTITIONER

## 2021-05-15 PROCEDURE — 63710000001 TOPIRAMATE PER 25 MG: Performed by: ORTHOPAEDIC SURGERY

## 2021-05-15 PROCEDURE — 63710000001 ASPIRIN 81 MG CHEWABLE TABLET: Performed by: ORTHOPAEDIC SURGERY

## 2021-05-15 PROCEDURE — 63710000001 HYDROCODONE-ACETAMINOPHEN 7.5-325 MG TABLET: Performed by: ORTHOPAEDIC SURGERY

## 2021-05-15 PROCEDURE — 97162 PT EVAL MOD COMPLEX 30 MIN: CPT

## 2021-05-15 PROCEDURE — 97110 THERAPEUTIC EXERCISES: CPT

## 2021-05-15 PROCEDURE — 63710000001 POTASSIUM CHLORIDE 10 MEQ TABLET CONTROLLED-RELEASE: Performed by: ORTHOPAEDIC SURGERY

## 2021-05-15 RX ORDER — HYDROCODONE BITARTRATE AND ACETAMINOPHEN 7.5; 325 MG/1; MG/1
1 TABLET ORAL EVERY 4 HOURS PRN
Qty: 50 TABLET | Refills: 0 | Status: SHIPPED | OUTPATIENT
Start: 2021-05-15 | End: 2021-05-21

## 2021-05-15 RX ADMIN — ASPIRIN 81 MG CHEWABLE TABLET 81 MG: 81 TABLET CHEWABLE at 08:13

## 2021-05-15 RX ADMIN — TOPIRAMATE 50 MG: 25 TABLET, FILM COATED ORAL at 08:13

## 2021-05-15 RX ADMIN — LOSARTAN POTASSIUM 25 MG: 25 TABLET, FILM COATED ORAL at 08:12

## 2021-05-15 RX ADMIN — CEFAZOLIN SODIUM 2 G: 10 INJECTION, POWDER, FOR SOLUTION INTRAVENOUS at 08:13

## 2021-05-15 RX ADMIN — PANTOPRAZOLE SODIUM 40 MG: 40 TABLET, DELAYED RELEASE ORAL at 08:00

## 2021-05-15 RX ADMIN — POTASSIUM CHLORIDE 10 MEQ: 750 TABLET, EXTENDED RELEASE ORAL at 08:00

## 2021-05-15 RX ADMIN — AMIODARONE HYDROCHLORIDE 200 MG: 200 TABLET ORAL at 08:14

## 2021-05-15 RX ADMIN — HYDROCODONE BITARTRATE AND ACETAMINOPHEN 2 TABLET: 7.5; 325 TABLET ORAL at 05:38

## 2021-05-16 LAB
GLUCOSE BLDC GLUCOMTR-MCNC: 108 MG/DL (ref 70–105)
GLUCOSE BLDC GLUCOMTR-MCNC: 134 MG/DL (ref 70–105)
GLUCOSE BLDC GLUCOMTR-MCNC: 174 MG/DL (ref 70–105)
GLUCOSE BLDC GLUCOMTR-MCNC: 254 MG/DL (ref 70–105)
GLUCOSE BLDC GLUCOMTR-MCNC: 96 MG/DL (ref 70–105)

## 2021-05-26 ENCOUNTER — HOSPITAL ENCOUNTER (OUTPATIENT)
Dept: OTHER | Facility: HOSPITAL | Age: 76
Discharge: HOME | End: 2021-05-26
Attending: FAMILY MEDICINE

## 2021-05-26 ENCOUNTER — TRANSCRIBE ORDERS (OUTPATIENT)
Dept: ADMINISTRATIVE | Facility: HOSPITAL | Age: 76
End: 2021-05-26

## 2021-05-26 DIAGNOSIS — M79.661 RIGHT CALF PAIN: Primary | ICD-10-CM

## 2021-06-13 ENCOUNTER — HOSPITAL ENCOUNTER (OUTPATIENT)
Facility: HOSPITAL | Age: 76
Setting detail: OBSERVATION
Discharge: HOME | End: 2021-06-14
Attending: HOSPITALIST | Admitting: INTERNAL MEDICINE

## 2021-06-13 ENCOUNTER — APPOINTMENT (OUTPATIENT)
Dept: CARDIOLOGY | Facility: HOSPITAL | Age: 76
End: 2021-06-13

## 2021-06-13 DIAGNOSIS — R53.1 WEAKNESS: Primary | ICD-10-CM

## 2021-06-13 DIAGNOSIS — K92.1 MELENA: ICD-10-CM

## 2021-06-13 LAB
ABO GROUP BLD: NORMAL
ALBUMIN SERPL-MCNC: 4.2 G/DL (ref 3.5–5.2)
ALBUMIN/GLOB SERPL: 1.3 G/DL
ALP SERPL-CCNC: 146 U/L (ref 39–117)
ALT SERPL W P-5'-P-CCNC: 16 U/L (ref 1–41)
ANION GAP SERPL CALCULATED.3IONS-SCNC: 13 MMOL/L (ref 5–15)
APTT PPP: 23.4 SECONDS (ref 24–31)
AST SERPL-CCNC: 22 U/L (ref 1–40)
BASOPHILS # BLD AUTO: 0 10*3/MM3 (ref 0–0.2)
BASOPHILS NFR BLD AUTO: 0.4 % (ref 0–1.5)
BH CV LOW VAS LEFT COMMON FEMORAL SPONT: 1
BH CV LOW VAS RIGHT COMMON FEMORAL SPONT: 1
BH CV LOW VAS RIGHT POPLITEAL SPONT: 1
BH CV LOW VAS RIGHT PROXIMAL FEMORAL SPONT: 1
BH CV LOW VAS RIGHT SAPHENOFEMORAL JUNCTION SPONT: 1
BH CV LOWER VASCULAR LEFT COMMON FEMORAL AUGMENT: NORMAL
BH CV LOWER VASCULAR LEFT COMMON FEMORAL COMPETENT: NORMAL
BH CV LOWER VASCULAR LEFT COMMON FEMORAL COMPRESS: NORMAL
BH CV LOWER VASCULAR LEFT COMMON FEMORAL PHASIC: NORMAL
BH CV LOWER VASCULAR LEFT COMMON FEMORAL SPONT: NORMAL
BH CV LOWER VASCULAR LEFT COMMON FEMORAL THROMBUS: NORMAL
BH CV LOWER VASCULAR RIGHT COMMON FEMORAL AUGMENT: NORMAL
BH CV LOWER VASCULAR RIGHT COMMON FEMORAL COMPETENT: NORMAL
BH CV LOWER VASCULAR RIGHT COMMON FEMORAL COMPRESS: NORMAL
BH CV LOWER VASCULAR RIGHT COMMON FEMORAL PHASIC: NORMAL
BH CV LOWER VASCULAR RIGHT COMMON FEMORAL SPONT: NORMAL
BH CV LOWER VASCULAR RIGHT COMMON FEMORAL THROMBUS: NORMAL
BH CV LOWER VASCULAR RIGHT DISTAL FEMORAL COMPRESS: NORMAL
BH CV LOWER VASCULAR RIGHT GASTRONEMIUS COMPRESS: NORMAL
BH CV LOWER VASCULAR RIGHT GREATER SAPH AK COMPRESS: NORMAL
BH CV LOWER VASCULAR RIGHT GREATER SAPH BK COMPRESS: NORMAL
BH CV LOWER VASCULAR RIGHT LESSER SAPH COMPRESS: NORMAL
BH CV LOWER VASCULAR RIGHT MID FEMORAL AUGMENT: NORMAL
BH CV LOWER VASCULAR RIGHT MID FEMORAL COMPETENT: NORMAL
BH CV LOWER VASCULAR RIGHT MID FEMORAL COMPRESS: NORMAL
BH CV LOWER VASCULAR RIGHT MID FEMORAL PHASIC: NORMAL
BH CV LOWER VASCULAR RIGHT MID FEMORAL SPONT: NORMAL
BH CV LOWER VASCULAR RIGHT PERONEAL COMPRESS: NORMAL
BH CV LOWER VASCULAR RIGHT POPLITEAL AUGMENT: NORMAL
BH CV LOWER VASCULAR RIGHT POPLITEAL COMPETENT: NORMAL
BH CV LOWER VASCULAR RIGHT POPLITEAL COMPRESS: NORMAL
BH CV LOWER VASCULAR RIGHT POPLITEAL PHASIC: NORMAL
BH CV LOWER VASCULAR RIGHT POPLITEAL SPONT: NORMAL
BH CV LOWER VASCULAR RIGHT POPLITEAL THROMBUS: NORMAL
BH CV LOWER VASCULAR RIGHT POSTERIOR TIBIAL COMPRESS: NORMAL
BH CV LOWER VASCULAR RIGHT PROXIMAL FEMORAL AUGMENT: NORMAL
BH CV LOWER VASCULAR RIGHT PROXIMAL FEMORAL COMPETENT: NORMAL
BH CV LOWER VASCULAR RIGHT PROXIMAL FEMORAL COMPRESS: NORMAL
BH CV LOWER VASCULAR RIGHT PROXIMAL FEMORAL PHASIC: NORMAL
BH CV LOWER VASCULAR RIGHT PROXIMAL FEMORAL SPONT: NORMAL
BH CV LOWER VASCULAR RIGHT PROXIMAL FEMORAL THROMBUS: NORMAL
BH CV LOWER VASCULAR RIGHT SAPHENOFEMORAL JUNCTION COMPRESS: NORMAL
BH CV LOWER VASCULAR RIGHT SAPHENOFEMORAL JUNCTION THROMBUS: NORMAL
BH CV LOWER VASCULAR RIGHT VARICOSITY BK COMPRESS: NORMAL
BILIRUB SERPL-MCNC: 1 MG/DL (ref 0–1.2)
BLD GP AB SCN SERPL QL: NEGATIVE
BUN SERPL-MCNC: 18 MG/DL (ref 8–23)
BUN/CREAT SERPL: 11.9 (ref 7–25)
CALCIUM SPEC-SCNC: 10 MG/DL (ref 8.6–10.5)
CHLORIDE SERPL-SCNC: 105 MMOL/L (ref 98–107)
CO2 SERPL-SCNC: 22 MMOL/L (ref 22–29)
CREAT SERPL-MCNC: 1.51 MG/DL (ref 0.76–1.27)
DEPRECATED RDW RBC AUTO: 56 FL (ref 37–54)
EOSINOPHIL # BLD AUTO: 0.1 10*3/MM3 (ref 0–0.4)
EOSINOPHIL NFR BLD AUTO: 0.8 % (ref 0.3–6.2)
ERYTHROCYTE [DISTWIDTH] IN BLOOD BY AUTOMATED COUNT: 16.8 % (ref 12.3–15.4)
GFR SERPL CREATININE-BSD FRML MDRD: 45 ML/MIN/1.73
GLOBULIN UR ELPH-MCNC: 3.2 GM/DL
GLUCOSE BLDC GLUCOMTR-MCNC: 110 MG/DL (ref 70–105)
GLUCOSE SERPL-MCNC: 106 MG/DL (ref 65–99)
HCT VFR BLD AUTO: 32.6 % (ref 37.5–51)
HGB BLD-MCNC: 10.8 G/DL (ref 13–17.7)
HOLD SPECIMEN: NORMAL
HOLD SPECIMEN: NORMAL
INR PPP: 1.07 (ref 0.93–1.1)
IRON 24H UR-MRATE: 74 MCG/DL (ref 59–158)
IRON SATN MFR SERPL: 20 % (ref 20–50)
LYMPHOCYTES # BLD AUTO: 0.7 10*3/MM3 (ref 0.7–3.1)
LYMPHOCYTES NFR BLD AUTO: 8.4 % (ref 19.6–45.3)
MAXIMAL PREDICTED HEART RATE: 144 BPM
MCH RBC QN AUTO: 31.5 PG (ref 26.6–33)
MCHC RBC AUTO-ENTMCNC: 33 G/DL (ref 31.5–35.7)
MCV RBC AUTO: 95.5 FL (ref 79–97)
MONOCYTES # BLD AUTO: 0.5 10*3/MM3 (ref 0.1–0.9)
MONOCYTES NFR BLD AUTO: 6 % (ref 5–12)
NEUTROPHILS NFR BLD AUTO: 7.3 10*3/MM3 (ref 1.7–7)
NEUTROPHILS NFR BLD AUTO: 84.4 % (ref 42.7–76)
NRBC BLD AUTO-RTO: 0.1 /100 WBC (ref 0–0.2)
PLATELET # BLD AUTO: 191 10*3/MM3 (ref 140–450)
PMV BLD AUTO: 8 FL (ref 6–12)
POTASSIUM SERPL-SCNC: 4.2 MMOL/L (ref 3.5–5.2)
PROT SERPL-MCNC: 7.4 G/DL (ref 6–8.5)
PROTHROMBIN TIME: 11.7 SECONDS (ref 9.6–11.7)
RBC # BLD AUTO: 3.41 10*6/MM3 (ref 4.14–5.8)
RETICS # AUTO: 0.11 10*6/MM3 (ref 0.02–0.13)
RETICS/RBC NFR AUTO: 3.19 % (ref 0.7–1.9)
RH BLD: POSITIVE
SARS-COV-2 RNA PNL SPEC NAA+PROBE: NOT DETECTED
SODIUM SERPL-SCNC: 140 MMOL/L (ref 136–145)
STRESS TARGET HR: 122 BPM
T&S EXPIRATION DATE: NORMAL
TIBC SERPL-MCNC: 364 MCG/DL (ref 298–536)
TRANSFERRIN SERPL-MCNC: 244 MG/DL (ref 200–360)
WBC # BLD AUTO: 8.7 10*3/MM3 (ref 3.4–10.8)
WHOLE BLOOD HOLD SPECIMEN: NORMAL

## 2021-06-13 PROCEDURE — C9803 HOPD COVID-19 SPEC COLLECT: HCPCS

## 2021-06-13 PROCEDURE — 99284 EMERGENCY DEPT VISIT MOD MDM: CPT

## 2021-06-13 PROCEDURE — 87635 SARS-COV-2 COVID-19 AMP PRB: CPT | Performed by: HOSPITALIST

## 2021-06-13 PROCEDURE — 99220 PR INITIAL OBSERVATION CARE/DAY 70 MINUTES: CPT | Performed by: HOSPITALIST

## 2021-06-13 PROCEDURE — 85610 PROTHROMBIN TIME: CPT | Performed by: PHYSICIAN ASSISTANT

## 2021-06-13 PROCEDURE — G0378 HOSPITAL OBSERVATION PER HR: HCPCS

## 2021-06-13 PROCEDURE — 86901 BLOOD TYPING SEROLOGIC RH(D): CPT | Performed by: PHYSICIAN ASSISTANT

## 2021-06-13 PROCEDURE — 82962 GLUCOSE BLOOD TEST: CPT

## 2021-06-13 PROCEDURE — 86900 BLOOD TYPING SEROLOGIC ABO: CPT | Performed by: PHYSICIAN ASSISTANT

## 2021-06-13 PROCEDURE — 84466 ASSAY OF TRANSFERRIN: CPT | Performed by: HOSPITALIST

## 2021-06-13 PROCEDURE — 36415 COLL VENOUS BLD VENIPUNCTURE: CPT | Performed by: PHYSICIAN ASSISTANT

## 2021-06-13 PROCEDURE — 82746 ASSAY OF FOLIC ACID SERUM: CPT | Performed by: HOSPITALIST

## 2021-06-13 PROCEDURE — 85045 AUTOMATED RETICULOCYTE COUNT: CPT | Performed by: HOSPITALIST

## 2021-06-13 PROCEDURE — 85025 COMPLETE CBC W/AUTO DIFF WBC: CPT | Performed by: PHYSICIAN ASSISTANT

## 2021-06-13 PROCEDURE — 82607 VITAMIN B-12: CPT | Performed by: HOSPITALIST

## 2021-06-13 PROCEDURE — 96374 THER/PROPH/DIAG INJ IV PUSH: CPT

## 2021-06-13 PROCEDURE — 86850 RBC ANTIBODY SCREEN: CPT | Performed by: PHYSICIAN ASSISTANT

## 2021-06-13 PROCEDURE — 93971 EXTREMITY STUDY: CPT

## 2021-06-13 PROCEDURE — 85730 THROMBOPLASTIN TIME PARTIAL: CPT | Performed by: INTERNAL MEDICINE

## 2021-06-13 PROCEDURE — 83540 ASSAY OF IRON: CPT | Performed by: HOSPITALIST

## 2021-06-13 PROCEDURE — 80053 COMPREHEN METABOLIC PANEL: CPT | Performed by: PHYSICIAN ASSISTANT

## 2021-06-13 RX ORDER — SODIUM CHLORIDE 0.9 % (FLUSH) 0.9 %
3-10 SYRINGE (ML) INJECTION AS NEEDED
Status: DISCONTINUED | OUTPATIENT
Start: 2021-06-13 | End: 2021-06-14 | Stop reason: HOSPADM

## 2021-06-13 RX ORDER — AMIODARONE HYDROCHLORIDE 200 MG/1
200 TABLET ORAL DAILY
Status: DISCONTINUED | OUTPATIENT
Start: 2021-06-14 | End: 2021-06-14

## 2021-06-13 RX ORDER — MULTIPLE VITAMINS W/ MINERALS TAB 9MG-400MCG
1 TAB ORAL DAILY
Status: DISCONTINUED | OUTPATIENT
Start: 2021-06-14 | End: 2021-06-14 | Stop reason: HOSPADM

## 2021-06-13 RX ORDER — ACETAMINOPHEN 160 MG/5ML
650 SOLUTION ORAL EVERY 4 HOURS PRN
Status: DISCONTINUED | OUTPATIENT
Start: 2021-06-13 | End: 2021-06-14 | Stop reason: HOSPADM

## 2021-06-13 RX ORDER — NITROGLYCERIN 0.4 MG/1
0.4 TABLET SUBLINGUAL
Status: DISCONTINUED | OUTPATIENT
Start: 2021-06-13 | End: 2021-06-14 | Stop reason: HOSPADM

## 2021-06-13 RX ORDER — FOLIC ACID 1 MG/1
1000 TABLET ORAL DAILY
Status: DISCONTINUED | OUTPATIENT
Start: 2021-06-14 | End: 2021-06-14 | Stop reason: HOSPADM

## 2021-06-13 RX ORDER — LOSARTAN POTASSIUM 25 MG/1
25 TABLET ORAL
Status: DISCONTINUED | OUTPATIENT
Start: 2021-06-14 | End: 2021-06-14 | Stop reason: HOSPADM

## 2021-06-13 RX ORDER — ACETAMINOPHEN 650 MG/1
650 SUPPOSITORY RECTAL EVERY 4 HOURS PRN
Status: DISCONTINUED | OUTPATIENT
Start: 2021-06-13 | End: 2021-06-14 | Stop reason: HOSPADM

## 2021-06-13 RX ORDER — ONDANSETRON 4 MG/1
4 TABLET, FILM COATED ORAL EVERY 6 HOURS PRN
Status: DISCONTINUED | OUTPATIENT
Start: 2021-06-13 | End: 2021-06-14 | Stop reason: HOSPADM

## 2021-06-13 RX ORDER — TAMSULOSIN HYDROCHLORIDE 0.4 MG/1
0.4 CAPSULE ORAL DAILY
Status: DISCONTINUED | OUTPATIENT
Start: 2021-06-14 | End: 2021-06-14 | Stop reason: HOSPADM

## 2021-06-13 RX ORDER — PANTOPRAZOLE SODIUM 40 MG/1
40 TABLET, DELAYED RELEASE ORAL
Status: DISCONTINUED | OUTPATIENT
Start: 2021-06-13 | End: 2021-06-14 | Stop reason: HOSPADM

## 2021-06-13 RX ORDER — SODIUM CHLORIDE 0.9 % (FLUSH) 0.9 %
3 SYRINGE (ML) INJECTION EVERY 12 HOURS SCHEDULED
Status: DISCONTINUED | OUTPATIENT
Start: 2021-06-13 | End: 2021-06-14 | Stop reason: HOSPADM

## 2021-06-13 RX ORDER — CALCIUM CARBONATE 200(500)MG
2 TABLET,CHEWABLE ORAL 3 TIMES DAILY PRN
Status: DISCONTINUED | OUTPATIENT
Start: 2021-06-13 | End: 2021-06-14 | Stop reason: HOSPADM

## 2021-06-13 RX ORDER — SODIUM CHLORIDE 0.9 % (FLUSH) 0.9 %
10 SYRINGE (ML) INJECTION AS NEEDED
Status: DISCONTINUED | OUTPATIENT
Start: 2021-06-13 | End: 2021-06-14 | Stop reason: HOSPADM

## 2021-06-13 RX ORDER — AMIODARONE HYDROCHLORIDE 200 MG/1
200 TABLET ORAL 2 TIMES DAILY
Status: DISCONTINUED | OUTPATIENT
Start: 2021-06-13 | End: 2021-06-13

## 2021-06-13 RX ORDER — ONDANSETRON 2 MG/ML
4 INJECTION INTRAMUSCULAR; INTRAVENOUS EVERY 6 HOURS PRN
Status: DISCONTINUED | OUTPATIENT
Start: 2021-06-13 | End: 2021-06-14 | Stop reason: HOSPADM

## 2021-06-13 RX ORDER — PANTOPRAZOLE SODIUM 40 MG/10ML
80 INJECTION, POWDER, LYOPHILIZED, FOR SOLUTION INTRAVENOUS ONCE
Status: COMPLETED | OUTPATIENT
Start: 2021-06-13 | End: 2021-06-13

## 2021-06-13 RX ORDER — TOPIRAMATE 25 MG/1
25 TABLET ORAL 2 TIMES DAILY
Status: DISCONTINUED | OUTPATIENT
Start: 2021-06-13 | End: 2021-06-14 | Stop reason: HOSPADM

## 2021-06-13 RX ORDER — CHOLECALCIFEROL (VITAMIN D3) 125 MCG
5 CAPSULE ORAL NIGHTLY PRN
Status: DISCONTINUED | OUTPATIENT
Start: 2021-06-13 | End: 2021-06-14 | Stop reason: HOSPADM

## 2021-06-13 RX ORDER — TAMSULOSIN HYDROCHLORIDE 0.4 MG/1
1 CAPSULE ORAL DAILY
COMMUNITY
End: 2021-10-11

## 2021-06-13 RX ORDER — ACETAMINOPHEN 325 MG/1
650 TABLET ORAL EVERY 4 HOURS PRN
Status: DISCONTINUED | OUTPATIENT
Start: 2021-06-13 | End: 2021-06-14 | Stop reason: HOSPADM

## 2021-06-13 RX ORDER — SUCRALFATE 1 G/1
1 TABLET ORAL
Status: DISCONTINUED | OUTPATIENT
Start: 2021-06-13 | End: 2021-06-14 | Stop reason: HOSPADM

## 2021-06-13 RX ORDER — POTASSIUM CHLORIDE 750 MG/1
10 TABLET, FILM COATED, EXTENDED RELEASE ORAL EVERY MORNING
Status: DISCONTINUED | OUTPATIENT
Start: 2021-06-14 | End: 2021-06-14 | Stop reason: HOSPADM

## 2021-06-13 RX ORDER — FERROUS SULFATE TAB EC 324 MG (65 MG FE EQUIVALENT) 324 (65 FE) MG
324 TABLET DELAYED RESPONSE ORAL 2 TIMES DAILY WITH MEALS
COMMUNITY
End: 2022-11-15

## 2021-06-13 RX ADMIN — TOPIRAMATE 25 MG: 25 TABLET, FILM COATED ORAL at 20:34

## 2021-06-13 RX ADMIN — PANTOPRAZOLE SODIUM 40 MG: 40 TABLET, DELAYED RELEASE ORAL at 20:36

## 2021-06-13 RX ADMIN — Medication 3 ML: at 20:37

## 2021-06-13 RX ADMIN — SUCRALFATE 1 G: 1 TABLET ORAL at 20:34

## 2021-06-13 RX ADMIN — PANTOPRAZOLE SODIUM 80 MG: 40 INJECTION, POWDER, FOR SOLUTION INTRAVENOUS at 11:42

## 2021-06-13 NOTE — ED PROVIDER NOTES
Subjective   Chief Complaint: Blood in stool, weakness    Patient is a 76-year-old  male history of CAD, A. fib, diabetes, GERD, hypertension, history of TBI presents the ER with complaint of generalized weakness and melena.  Patient states he has poor memory due to previous TBI, poor historian.  Patient states he has been feeling weak, fatigued over the last few days, wife at bedside reports blood in his stool over the last 2 to 3 days, started bright red, today dark and tarry.  Patient denies any abdominal pain vomiting or diarrhea.  He does report some nausea.  No dysuria.  He denies chest pain shortness of breath headache or fever or chills.  Patient had right knee replacement per Dr. Quijano 5/14/2021, reports swelling and pain in his right leg that he describes as an ache that he rates an 8/10.  Denies numbness or tingling.  Patient taking aspirin, no other blood thinners.    Location: Right leg    Quality: Aching    Duration: Few days    Timing: Constant    Severity: Mild to moderate    Associated Symptoms: None    PCP: Ravi Guevara      History provided by:  Patient      Review of Systems   Constitutional: Negative for chills and fever.   HENT: Negative for congestion, sore throat and trouble swallowing.    Eyes: Negative for visual disturbance.   Respiratory: Negative for cough, shortness of breath and wheezing.    Cardiovascular: Negative for chest pain.   Gastrointestinal: Positive for blood in stool and nausea. Negative for abdominal pain, diarrhea and vomiting.   Genitourinary: Negative for dysuria.   Musculoskeletal: Negative for myalgias.   Skin: Negative for rash.   Neurological: Positive for weakness and light-headedness. Negative for headaches.   Psychiatric/Behavioral: Negative for behavioral problems.   All other systems reviewed and are negative.      Past Medical History:   Diagnosis Date   • Anxiety    • Atherosclerosis of coronary artery 12/26/2020    Alejandra Wallace at [address]    • Atrial fibrillation and flutter (CMS/[facility]) 12/26/2020   • BPH (benign prostatic hyperplasia) 12/26/2020   • Confused    • Depression    • Diabetes mellitus (CMS/HCC)    • Gastroesophageal reflux disease 12/26/2020   • Heart disease    • Hemorrhage     rectal.    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   • Traumatic brain injury (CMS/HCC) 2010       Allergies   Allergen Reactions   • Benadryl [Diphenhydramine] Other (See Comments)     Cause pt to be hard to waken   • Demerol [Meperidine] Hallucinations   • Dilaudid [Hydromorphone] Hallucinations   • Lacosamide    • Levetiracetam    • Lisinopril    • Morphine Hallucinations   • Oxycodone    • Phenergan [Promethazine Hcl] Other (See Comments)     Due to Brain injury   • Sulfa Antibiotics Other (See Comments)     Was instructed not to mix with methotrexate   • Trazodone      PAINFUL ERECTION       Past Surgical History:   Procedure Laterality Date   • CARDIAC SURGERY  2006    triple bypass   • CARDIOVERSION  11/2020   • KNEE SURGERY     • TOTAL HIP ARTHROPLASTY Bilateral    • TOTAL HIP ARTHROPLASTY REVISION Right 12/29/2020    Procedure: right posterior TOTAL HIP ARTHROPLASTY REVISION, pegboard lateral;  Surgeon: Damian Quijano II, MD;  Location: [facility] MAIN OR;  Service: Orthopedics;  Laterality: Right;   • TOTAL KNEE ARTHROPLASTY Right 5/14/2021    Procedure: TOTAL KNEE ARTHROPLASTY;  Surgeon: Damian Quijano II, MD;  Location: [facility] MAIN OR;  Service: Orthopedics;  Laterality: Right;       Family History   Problem Relation Age of Onset   • Stroke Mother    • Diabetes Father        Social History     Socioeconomic History   • Marital  status:      Spouse name: Not on file   • Number of children: Not on file   • Years of education: Not on file   • Highest education level: Not on file   Tobacco Use   • Smoking status: Never Smoker   Substance and Sexual Activity   • Alcohol use: No   • Drug use: No   • Sexual activity: Defer           Objective   Physical Exam  Vitals and nursing note reviewed.   Constitutional:       General: He is not in acute distress.     Appearance: Normal appearance. He is normal weight. He is not diaphoretic.   HENT:      Head: Normocephalic.      Nose: Nose normal.      Mouth/Throat:      Mouth: Mucous membranes are moist.   Eyes:      Extraocular Movements: Extraocular movements intact.      Pupils: Pupils are equal, round, and reactive to light.   Cardiovascular:      Rate and Rhythm: Normal rate and regular rhythm.      Pulses: Normal pulses.      Heart sounds: Normal heart sounds. No murmur heard.     Abdominal:      General: Abdomen is flat.      Tenderness: There is no abdominal tenderness. There is no right CVA tenderness or left CVA tenderness.   Genitourinary:     Rectum: Normal. Guaiac result positive.   Musculoskeletal:         General: Swelling and tenderness present. Normal range of motion.      Cervical back: Normal range of motion.      Comments: Tenderness, erythema, edema noted right leg, incision is clean, intact, no dehiscence  Tender entire right leg, anterior compartments are soft  Pedal pulses present 2+ bilaterally  Sensation to soft touch intact, motor strength 5/5 bilaterally   Skin:     General: Skin is warm.      Capillary Refill: Capillary refill takes less than 2 seconds.   Neurological:      General: No focal deficit present.      Mental Status: He is alert and oriented to person, place, and time.      Cranial Nerves: No cranial nerve deficit.      Motor: No weakness.   Psychiatric:         Mood and Affect: Mood normal.         Behavior: Behavior normal.         Procedures           ED  "Course  ED Course as of Jun 13 1405   Sun Jun 13, 2021   1251 Per [facility], patient has multiple DVTs right leg, all chronic    [MM]   1311 12.3, one month ago   Hemoglobin(!): 10.8 [MM]   1312 Patient reevaluated still complaining of some generalized weakness, no complaints of pain.    [MM]   1333 Spoke with Dr. Gonzalez, to discuss patient admission    [MM]      ED Course User Index  [MM] Valerie Lewis, PA    /50   Pulse 57   Temp 97.9 °F (36.6 °C) (Oral)   Resp 16   Ht 188 cm (74\")   Wt 95.3 kg (210 lb)   SpO2 100%   BMI 26.96 kg/m²   Labs Reviewed   COMPREHENSIVE METABOLIC PANEL - Abnormal; Notable for the following components:       Result Value    Glucose 106 (*)     Creatinine 1.51 (*)     Alkaline Phosphatase 146 (*)     eGFR Non  Amer 45 (*)     All other components within normal limits    Narrative:     GFR Normal >60  Chronic Kidney Disease <60  Kidney Failure <15     CBC WITH AUTO DIFFERENTIAL - Abnormal; Notable for the following components:    RBC 3.41 (*)     Hemoglobin 10.8 (*)     Hematocrit 32.6 (*)     RDW 16.8 (*)     RDW-SD 56.0 (*)     Neutrophil % 84.4 (*)     Lymphocyte % 8.4 (*)     Neutrophils, Absolute 7.30 (*)     All other components within normal limits   PROTIME-INR - Normal   RAINBOW DRAW    Narrative:     The following orders were created for panel order [place] Draw.  Procedure                               Abnormality         Status                     ---------                               -----------         ------                     Green Top (Gel)[[id number]]                                  Final result               Lavender Top[[id number]]                                     Final result               Gold Top - SST[[id number]]                                   Final result                 Please view results for these tests on the individual orders.   TYPE AND SCREEN   BB ARMBAND CHECK   CBC AND DIFFERENTIAL    Narrative:     The following " orders were created for panel order CBC & Differential.  Procedure                               Abnormality         Status                     ---------                               -----------         ------                     CBC Auto Differential[[id number]]        Abnormal            Final result                 Please view results for these tests on the individual orders.   GREEN TOP   LAVENDER TOP   GOLD TOP - SST     Medications   sodium chloride 0.9 % flush 10 mL (has no administration in time range)   pantoprazole (PROTONIX) injection 80 mg (80 mg Intravenous Given 6/13/21 1142)     No radiology results for the last day                                         MDM  Number of Diagnoses or Management Options  Melena  Weakness  Diagnosis management comments: MEDICAL DECISION  Epic Chart Review:  Comorbidities: HTN, hx TBI, CAD, A fib  Differentials:   GI bleed, anemia, electrolyte abnormality, UTI,; this list is not all inclusive and does not constitute the entirety of considered causes  Radiology interpretation:  Not warranted  Lab interpretation:  Labs viewed by me significant for, as above    While in the ED IV was placed and labs were obtained appropriate PPE was worn during exam and throughout all encounters with the patient. Patient had the above evaluation. Patient offers no complaints of pain currently. MILDRED heme positive. VSS. Patient given 80mg IV protonix for GI bleed. Labwork significant for Hgb 10.8, Hct 32.6. CMP glucose 106, creatinine 1.51, normal sodium and potassium.  Doppler RLE positive for chronic DVT, no acute DVT. Patient will be admitted for generalized weakness, melena, possible GI consult for colonoscopy to evaluate for GI bleed. Patient and wife agreeable. I spoke with on call hospitalist, Dr. Gonzalez who agrees to accept patient for admission. Patient stable on admission.        Amount and/or Complexity of Data Reviewed  Clinical lab tests: ordered and reviewed  Tests in the radiology  section of CPT®: ordered and reviewed    Patient Progress  Patient progress: stable      Final diagnoses:   Weakness   Melena       ED Disposition  ED Disposition     ED Disposition Condition Comment    Decision to Admit  Level of Care: Med/Surg [1]   Diagnosis: Weakness [539952]   Admitting Physician: SUSHILA PRICE [[id number]]   Attending Physician: SUSHILA PRICE [[id number]]            No follow-up provider specified.       Medication List      No changes were made to your prescriptions during this visit.          Valerie Lewis PA  06/13/21 1526

## 2021-06-13 NOTE — PLAN OF CARE
Goal Outcome Evaluation:         Pt admitted to unit. Wife at bedside helping answer questions. Pt is a good historian. Pt uses cane with ambulation and is current with out patient PT.

## 2021-06-13 NOTE — H&P
[facility]      Patient Name: Jason Arceo  : 1945  MRN: [id number]  Primary Care Physician: Ravi Guevara MD  Date of admission: 2021    Patient Care Team:  Ravi Guevara MD as PCP - General (Family Medicine)  Ravi Guevara MD (Family Medicine)          Subjective   History Present Illness     Chief Complaint:   Chief Complaint   Patient presents with   • Black or Bloody Stool       History of present illness:  History is obtained from the patient's wife at the bedside because he has a history of TBI and short-term memory loss.    Mr. Arceo is a 76 y.o. male with a history of diabetes, CAD, atrial fibrillation, HTN, GERD and traumatic brain injury with short-term memory loss who presents to [facility] complaining of generalized weakness and black stool that began the day of admission.  The patient's wife reports that that he had had intermittent scant bleeding per rectum over the last several days which was attributable to his history of hemorrhoids, but the day of admission he had a large black stool and she was concerned that it could be digested blood so she brought him to the emergency room.  He had been started on iron recently so she was not sure if it was the cause of the black stools.  Patient has had no complaints of abdominal pain, recent weight changes; no nausea or vomiting, fever, chills, sweats, shortness of breath, chest pain or cough; no swelling or recent weight changes.           ROS   12 point review of systems was reviewed and was negative except as above.        Personal History     Past Medical History:   Past Medical History:   Diagnosis Date   • Anxiety    • Atherosclerosis of coronary artery 2020    Alejandra Wallace at [address]   • Atrial fibrillation and flutter (CMS/HCC) 2020   • BPH (benign prostatic hyperplasia) 2020   • Confused    • Depression    • Diabetes mellitus (CMS/HCC)    •  Gastroesophageal reflux disease 2020   • Heart disease    • Hemorrhage     rectal.    • History of cardioversion 2020   • Hypertension 2014   • MI (myocardial infarction) (CMS/[facility])    • Osteoarthritis    • Panic attack    • Presence of IVC filter     wynn filter   • Rheumatoid arthritis (CMS/[facility])    • S/P hip replacement 10/16/2015   • Seizure (CMS/[facility])     x 1 only   • Thrombosis of iliac artery (CMS/[facility]) 2020   • Traumatic brain injury (CMS/[facility])        Surgical History:      Past Surgical History:   Procedure Laterality Date   • CARDIAC SURGERY  2006    triple bypass   • CARDIOVERSION  2020   • KNEE SURGERY     • TOTAL HIP ARTHROPLASTY Bilateral    • TOTAL HIP ARTHROPLASTY REVISION Right 2020    Procedure: right posterior TOTAL HIP ARTHROPLASTY REVISION, pegboard lateral;  Surgeon: Damian Quijano II, MD;  Location: [facility] MAIN OR;  Service: Orthopedics;  Laterality: Right;   • TOTAL KNEE ARTHROPLASTY Right 2021    Procedure: TOTAL KNEE ARTHROPLASTY;  Surgeon: Damian Quijano II, MD;  Location: [facility] MAIN OR;  Service: Orthopedics;  Laterality: Right;           Family History: family history includes Diabetes in his father; Stroke in his mother.  Maternal grandfather  in his early 50s of heart disease.  Family History was reviewed.     Social History:  reports that he has never smoked. He does not have any smokeless tobacco history on file. He reports that he does not drink alcohol and does not use drugs.      Medications:  Prior to Admission medications    Medication Sig Start Date End Date Taking? Authorizing Provider   amiodarone (PACERONE) 200 MG tablet Take 200 mg by mouth 2 (Two) Times a Day. Take dos   Yes Tyler Vilchis MD   aspirin 81 MG chewable tablet Chew 81 mg Daily. Stop 5 days prior to surgery- notified wife   Yes Tyler Vilchis MD   dexlansoprazole (DEXILANT) 60 MG capsule Take 60 mg by mouth Daily.   Yes Mame  MD Tyler   folic acid (FOLVITE) 1 MG tablet Take 1,000 mcg by mouth Daily. 10/22/18  Yes Tyler Vilchis MD   losartan (COZAAR) 25 MG tablet Take 1 tablet by mouth Daily.  Patient taking differently: Take 25 mg by mouth every night at bedtime. 1/1/21  Yes Jesús Lackey MD   metFORMIN (GLUCOPHAGE) 500 MG tablet Take 500 mg by mouth every night at bedtime. LD 5-12 before 1630 1/13/16  Yes Tyler Vilchis MD   methotrexate 2.5 MG tablet Take 2.5 mg by mouth 1 (One) Time Per Week. 10 tablets on Sunday 12/4/17  Yes Tyler Vilchis MD   Multiple Vitamin (MULTI VITAMIN DAILY PO) Take 1 tablet by mouth Daily. Stop 5-7   Yes Tyler Vilchis MD   Multiple Vitamins-Minerals (ICAPS AREDS 2) capsule Take  by mouth. Stop 5-7 for surgery   Yes Tyler Vilchis MD   NON FORMULARY Take 2 tablets by mouth Daily. Focus factor   Yes Tyler Vilchis MD   potassium chloride (K-DUR,KLOR-CON) 10 MEQ CR tablet Take 10 mEq by mouth Every Morning. Take dos   Yes Tyler Vilchis MD   tamsulosin (FLOMAX) 0.4 MG capsule 24 hr capsule Take 1 capsule by mouth Daily.   Yes Tyler Vilchis MD   topiramate (TOPAMAX) 25 MG tablet Take 1 tablet by mouth 2 (Two) Times a Day for 90 days. 12/31/20 6/13/21 Yes Jesús Lackey MD   amoxicillin (AMOXIL) 500 MG capsule TAKE FOUR CAPSULES ONE HOUR BEFORE DENTAL APPOINTMENT. 11/26/18 6/13/21  Tyler Vilchis MD   apixaban (ELIQUIS) 5 MG tablet tablet Take 5 mg by mouth 2 (Two) Times a Day.  6/13/21  Tyler Vilchis MD   Apoaequorin (PREVAGEN EXTRA STRENGTH) 20 MG capsule Take 20 mg by mouth Daily.  6/13/21  Tyler Vilchis MD       Allergies:    Allergies   Allergen Reactions   • Benadryl [Diphenhydramine] Other (See Comments)     Cause pt to be hard to waken   • Demerol [Meperidine] Hallucinations   • Dilaudid [Hydromorphone] Hallucinations   • Lacosamide    • Levetiracetam    • Lisinopril    • Morphine  Hallucinations   • Oxycodone    • Phenergan [Promethazine Hcl] Other (See Comments)     Due to Brain injury   • Sulfa Antibiotics Other (See Comments)     Was instructed not to mix with methotrexate   • Trazodone      PAINFUL ERECTION       Objective   Objective     Vital Signs  Temp:  [97.9 °F (36.6 °C)] 97.9 °F (36.6 °C)  Heart Rate:  [57-70] 59  Resp:  [16] 16  BP: (105-123)/(49-56) 116/54  SpO2:  [95 %-100 %] 95 %  on   ;      Body mass index is 26.96 kg/m².    Physical Exam  Vital signs and nurses notes reviewed.  Well-developed well-nourished in no acute distress sitting up in bed awake and alert; mucous membranes moist; sclerae anicteric; neck supple; lungs clear to auscultation bilaterally; CV regular rate and rhythm; abdomen soft nontender nondistended with active bowel sounds; extremities with no edema, cyanosis or calf tenderness; palpable pedal pulses bilaterally; neurologic exam grossly nonfocal; no Keller catheter.    Results Review:  I have personally reviewed most recent cardiac tracings, lab results and radiology images and interpretations.    Results from last 7 days   Lab Units 06/13/21  1132   WBC 10*3/mm3 8.70   HEMOGLOBIN g/dL 10.8*   HEMATOCRIT % 32.6*   PLATELETS 10*3/mm3 191   INR  1.07     Results from last 7 days   Lab Units 06/13/21  1132   SODIUM mmol/L 140   POTASSIUM mmol/L 4.2   CHLORIDE mmol/L 105   CO2 mmol/L 22.0   BUN mg/dL 18   CREATININE mg/dL 1.51*   GLUCOSE mg/dL 106*   CALCIUM mg/dL 10.0   ALT (SGPT) U/L 16   AST (SGOT) U/L 22     Estimated Creatinine Clearance: 56.1 mL/min (A) (by C-G formula based on SCr of 1.51 mg/dL (H)).  Brief Urine Lab Results  (Last result in the past 365 days)      Color   Clarity   Blood   Leuk Est   Nitrite   Protein   CREAT   Urine HCG        05/06/21 1340 Yellow Clear Negative Negative Negative Negative               Microbiology Results (last 10 days)     Procedure Component Value - Date/Time    COVID PRE-OP / PRE-PROCEDURE SCREENING ORDER (NO  ISOLATION) - Swab, Nasopharynx [[id number]]  (Normal) Collected: 06/13/21 1505    Lab Status: Final result Specimen: Swab from Nasopharynx Updated: 06/13/21 1530    Narrative:      The following orders were created for panel order COVID PRE-OP / PRE-PROCEDURE SCREENING ORDER (NO ISOLATION) - Swab, Nasopharynx.  Procedure                               Abnormality         Status                     ---------                               -----------         ------                     COVID-19,CEPHEID,COR/REBEKAH...[[id number]]  Normal              Final result                 Please view results for these tests on the individual orders.    COVID-19,CEPHEID,COR/REBEKAH/PAD/PETE IN-HOUSE(OR EMERGENT/ADD-ON),NP SWAB IN TRANSPORT MEDIA 3-4 HR TAT, RT-PCR - Swab, Nasopharynx [[id number]]  (Normal) Collected: 06/13/21 1505    Lab Status: Final result Specimen: Swab from Nasopharynx Updated: 06/13/21 1530     COVID19 Not Detected    Narrative:      Fact sheet for providers: https://www.fda.gov/media/164858/download     Fact sheet for patients: https://www.fda.gov/media/842350/download  Fact sheet for providers: https://www.fda.gov/media/693789/download    Fact sheet for patients: https://www.fda.gov/media/812852/download    Test performed by PCR.          ECG/EMG Results (most recent)     None              Results for orders placed during the hospital encounter of 12/26/20    Adult Transthoracic Echo Complete W/ Cont if Necessary Per Protocol    Interpretation Summary  · Estimated left ventricular EF was in agreement with the calculated left ventricular EF. Left ventricular ejection fraction appears to be 56 - 60%. Left ventricular systolic function is normal.  · There is mild, bileaflet mitral valve thickening present.  · Left ventricular wall thickness is consistent with moderate concentric hypertrophy.  · Severe tricuspid valve regurgitation is present.  · Estimated right ventricular systolic pressure from tricuspid regurgitation is  markedly elevated (>55 mmHg). Calculated right ventricular systolic pressure from tricuspid regurgitation is 62.2 mmHg.  · Left ventricular diastolic function is consistent with (grade Ia w/high LAP) impaired relaxation.  · The right ventricular cavity is mild to moderately dilated.      No radiology results for the last 7 days      Estimated Creatinine Clearance: 56.1 mL/min (A) (by C-G formula based on SCr of 1.51 mg/dL (H)).    Assessment/Plan   Assessment/Plan       Active Hospital Problems    Diagnosis  POA   • Weakness [R53.1]  Yes      Resolved Hospital Problems   No resolved problems to display.       Assessment and plan:    Recurrent GI bleeding  History of colon polyps and hemorrhoids  Melanotic stool versus black stool from iron therapy  -Hold aspirin  -GI consult if bleeding persists    Chronic anemia likely secondary to CKD  -Hemoglobin 10.8 compared to 10.6 05/15/2021  -Continue iron and folic acid  -Clear liquid diet  -Serial H&H    Chronic kidney disease stage IIIa secondary to hypertension and diabetes  -Creatinine 1.51 on admission compared to 1.31 on 05/15/2021 with baseline creatinine of 1.3  -Avoid nephrotoxic medications  -Monitor    Diabetes mellitus type 2  -Hold Metformin  -Check A1c  -SSI    Essential hypertension, chronic and controlled  -Continue losartan    History of atrial fibrillation status post cardioversion  -Patient's wife reports that he was taken off of oral anticoagulants several months after successful cardioversion  -Continue amiodarone    Coronary artery disease status post MI  -Hold aspirin due to GI bleeding    Chronic systolic congestive heart failure secondary to pulmonary hypertension  -Most recent echo as above    Traumatic brain injury  Seizure disorder  -Continue Topamax    GERD   -Continue Dexilant    Rheumatoid arthritis  -Continue methotrexate    BPH  -Continue tamsulosin    VTE Prophylaxis -   Mechanical Order History:      Ordered        Signed and Held  Place  Sequential Compression Device  Once         Signed and Held  Maintain Sequential Compression Device  Continuous                 Pharmalogical Order History:     None          CODE STATUS:    Code Status and Medical Interventions:   Ordered at: 06/13/21 1543     Code Status:    CPR     Medical Interventions (Level of Support Prior to Arrest):    Full       This patient has been examined wearing appropriate Personal Protective Equipment . 06/13/21      I discussed the patient's findings and my recommendations with patient and Wife at the bedside.      Signature:Electronically signed by Riri Gonzalez MD, 06/14/21, 12:15 AM EDT.      [facility] Hospitalist Team

## 2021-06-14 ENCOUNTER — ON CAMPUS - OUTPATIENT (AMBULATORY)
Dept: URBAN - METROPOLITAN AREA HOSPITAL 85 | Facility: HOSPITAL | Age: 76
End: 2021-06-14

## 2021-06-14 VITALS
HEART RATE: 56 BPM | TEMPERATURE: 97.3 F | OXYGEN SATURATION: 98 % | BODY MASS INDEX: 27.11 KG/M2 | HEIGHT: 74 IN | RESPIRATION RATE: 13 BRPM | DIASTOLIC BLOOD PRESSURE: 44 MMHG | WEIGHT: 211.2 LBS | SYSTOLIC BLOOD PRESSURE: 115 MMHG

## 2021-06-14 DIAGNOSIS — K59.00 CONSTIPATION, UNSPECIFIED: ICD-10-CM

## 2021-06-14 DIAGNOSIS — K92.2 GASTROINTESTINAL HEMORRHAGE, UNSPECIFIED: ICD-10-CM

## 2021-06-14 DIAGNOSIS — D64.89 OTHER SPECIFIED ANEMIAS: ICD-10-CM

## 2021-06-14 DIAGNOSIS — K92.1 MELENA: ICD-10-CM

## 2021-06-14 LAB
ANION GAP SERPL CALCULATED.3IONS-SCNC: 10 MMOL/L (ref 5–15)
BASOPHILS # BLD AUTO: 0.1 10*3/MM3 (ref 0–0.2)
BASOPHILS NFR BLD AUTO: 0.8 % (ref 0–1.5)
BUN SERPL-MCNC: 17 MG/DL (ref 8–23)
BUN/CREAT SERPL: 11.6 (ref 7–25)
CALCIUM SPEC-SCNC: 9.2 MG/DL (ref 8.6–10.5)
CHLORIDE SERPL-SCNC: 105 MMOL/L (ref 98–107)
CO2 SERPL-SCNC: 24 MMOL/L (ref 22–29)
CREAT SERPL-MCNC: 1.46 MG/DL (ref 0.76–1.27)
DEPRECATED RDW RBC AUTO: 56 FL (ref 37–54)
EOSINOPHIL # BLD AUTO: 0.2 10*3/MM3 (ref 0–0.4)
EOSINOPHIL NFR BLD AUTO: 2 % (ref 0.3–6.2)
ERYTHROCYTE [DISTWIDTH] IN BLOOD BY AUTOMATED COUNT: 16.7 % (ref 12.3–15.4)
FOLATE SERPL-MCNC: >20 NG/ML (ref 4.78–24.2)
GFR SERPL CREATININE-BSD FRML MDRD: 47 ML/MIN/1.73
GLUCOSE SERPL-MCNC: 91 MG/DL (ref 65–99)
HCT VFR BLD AUTO: 28.3 % (ref 37.5–51)
HCT VFR BLD AUTO: 30.5 % (ref 37.5–51)
HCT VFR BLD AUTO: 30.5 % (ref 37.5–51)
HGB BLD-MCNC: 10.2 G/DL (ref 13–17.7)
HGB BLD-MCNC: 10.2 G/DL (ref 13–17.7)
HGB BLD-MCNC: 9.4 G/DL (ref 13–17.7)
LYMPHOCYTES # BLD AUTO: 1 10*3/MM3 (ref 0.7–3.1)
LYMPHOCYTES NFR BLD AUTO: 13.6 % (ref 19.6–45.3)
MCH RBC QN AUTO: 31.9 PG (ref 26.6–33)
MCHC RBC AUTO-ENTMCNC: 33.4 G/DL (ref 31.5–35.7)
MCV RBC AUTO: 95.5 FL (ref 79–97)
MONOCYTES # BLD AUTO: 0.6 10*3/MM3 (ref 0.1–0.9)
MONOCYTES NFR BLD AUTO: 8 % (ref 5–12)
NEUTROPHILS NFR BLD AUTO: 5.7 10*3/MM3 (ref 1.7–7)
NEUTROPHILS NFR BLD AUTO: 75.6 % (ref 42.7–76)
NRBC BLD AUTO-RTO: 0 /100 WBC (ref 0–0.2)
PLATELET # BLD AUTO: 195 10*3/MM3 (ref 140–450)
PMV BLD AUTO: 7.6 FL (ref 6–12)
POTASSIUM SERPL-SCNC: 3.8 MMOL/L (ref 3.5–5.2)
RBC # BLD AUTO: 3.2 10*6/MM3 (ref 4.14–5.8)
SODIUM SERPL-SCNC: 139 MMOL/L (ref 136–145)
VIT B12 BLD-MCNC: 345 PG/ML (ref 211–946)
WBC # BLD AUTO: 7.5 10*3/MM3 (ref 3.4–10.8)

## 2021-06-14 PROCEDURE — 99217 PR OBSERVATION CARE DISCHARGE MANAGEMENT: CPT | Performed by: INTERNAL MEDICINE

## 2021-06-14 PROCEDURE — 99212 OFFICE O/P EST SF 10 MIN: CPT | Performed by: NURSE PRACTITIONER

## 2021-06-14 PROCEDURE — 85025 COMPLETE CBC W/AUTO DIFF WBC: CPT | Performed by: HOSPITALIST

## 2021-06-14 PROCEDURE — 85018 HEMOGLOBIN: CPT | Performed by: HOSPITALIST

## 2021-06-14 PROCEDURE — 85014 HEMATOCRIT: CPT | Performed by: HOSPITALIST

## 2021-06-14 PROCEDURE — 80048 BASIC METABOLIC PNL TOTAL CA: CPT | Performed by: HOSPITALIST

## 2021-06-14 PROCEDURE — G0378 HOSPITAL OBSERVATION PER HR: HCPCS

## 2021-06-14 RX ORDER — AMIODARONE HYDROCHLORIDE 200 MG/1
100 TABLET ORAL EVERY 12 HOURS SCHEDULED
Status: DISCONTINUED | OUTPATIENT
Start: 2021-06-14 | End: 2021-06-14 | Stop reason: HOSPADM

## 2021-06-14 RX ORDER — ASPIRIN 81 MG/1
81 TABLET, CHEWABLE ORAL NIGHTLY
Qty: 30 TABLET | Refills: 0 | Status: SHIPPED | OUTPATIENT
Start: 2021-06-19 | End: 2021-07-19

## 2021-06-14 RX ORDER — SUCRALFATE 1 G/1
1 TABLET ORAL
Qty: 90 TABLET | Refills: 0 | Status: SHIPPED | OUTPATIENT
Start: 2021-06-14 | End: 2023-02-15

## 2021-06-14 RX ORDER — FERROUS SULFATE TAB EC 324 MG (65 MG FE EQUIVALENT) 324 (65 FE) MG
324 TABLET DELAYED RESPONSE ORAL 2 TIMES DAILY WITH MEALS
Status: DISCONTINUED | OUTPATIENT
Start: 2021-06-14 | End: 2021-06-14 | Stop reason: HOSPADM

## 2021-06-14 RX ADMIN — LOSARTAN POTASSIUM 25 MG: 25 TABLET, FILM COATED ORAL at 09:24

## 2021-06-14 RX ADMIN — POTASSIUM CHLORIDE 10 MEQ: 750 TABLET, EXTENDED RELEASE ORAL at 09:25

## 2021-06-14 RX ADMIN — TAMSULOSIN HYDROCHLORIDE 0.4 MG: 0.4 CAPSULE ORAL at 09:25

## 2021-06-14 RX ADMIN — MULTIPLE VITAMINS W/ MINERALS TAB 1 TABLET: TAB at 09:24

## 2021-06-14 RX ADMIN — AMIODARONE HYDROCHLORIDE 100 MG: 200 TABLET ORAL at 09:24

## 2021-06-14 RX ADMIN — SUCRALFATE 1 G: 1 TABLET ORAL at 13:25

## 2021-06-14 RX ADMIN — PANTOPRAZOLE SODIUM 40 MG: 40 TABLET, DELAYED RELEASE ORAL at 09:24

## 2021-06-14 RX ADMIN — Medication 3 ML: at 09:29

## 2021-06-14 RX ADMIN — FERROUS SULFATE TAB EC 324 MG (65 MG FE EQUIVALENT) 324 MG: 324 (65 FE) TABLET DELAYED RESPONSE at 09:24

## 2021-06-14 RX ADMIN — FOLIC ACID 1000 MCG: 1 TABLET ORAL at 09:31

## 2021-06-14 RX ADMIN — TOPIRAMATE 25 MG: 25 TABLET, FILM COATED ORAL at 09:24

## 2021-06-14 RX ADMIN — SUCRALFATE 1 G: 1 TABLET ORAL at 09:24

## 2021-06-14 NOTE — CASE MANAGEMENT/SOCIAL WORK
Discharge Planning Assessment  [facility]     Patient Name: Jason Arceo  MRN: [id number]  Today's Date: 6/14/2021    Admit Date: 6/13/2021    Discharge Needs Assessment     Row Name 06/14/21 0911       Living Environment    Lives With  spouse    Name(s) of Who Lives With Patient  Spouse- Celia    Current Living Arrangements  home/apartment/condo    Primary Care Provided by  self    Provides Primary Care For  no one    Family Caregiver if Needed  spouse    Quality of Family Relationships  supportive;involved;helpful    Able to Return to Prior Arrangements  yes       Resource/Environmental Concerns    Resource/Environmental Concerns  none    Transportation Concerns  car, none       Transition Planning    Patient/Family Anticipates Transition to  home with family    Patient/Family Anticipated Services at Transition  outpatient care;rehabilitation services    Transportation Anticipated  family or friend will provide       Discharge Needs Assessment    Readmission Within the Last 30 Days  no previous admission in last 30 days    Equipment Currently Used at Home  walker, rolling    Concerns to be Addressed  denies needs/concerns at this time;no discharge needs identified    Anticipated Changes Related to Illness  none    Equipment Needed After Discharge  none    Provided Post Acute Provider List?  N/A        Discharge Plan     Row Name 06/14/21 6069       Plan    Plan  Routine home, declined needs. Current OP PT [facility].    Patient/Family in Agreement with Plan  yes    Plan Comments  CM met with patient and spouse at bedside to discuss dc planning. PCP confirmed (Ravi Guevara), preferred pharmacy confirmed ([facility]), reported no trouble affording medications at this time. Declined needs for any DME or HH services at this time. Reported they are current with PT at [facility] in [place] twice a week after knee replacement in May. Reported that MD is evaluating patient being able to bend his knee. Reported he  may have to manipulate if not at 100% by June 30% and he is at 80% now. DC orders in at this time.    Final Discharge Disposition Code  01 - home or self-care        Expected Discharge Date and Time     Expected Discharge Date Expected Discharge Time    Jun 14, 2021         Demographic Summary     Row Name 06/14/21 1433       General Information    Admission Type  observation    Required Notices Provided  Observation Status Notice MOON 6/13/21    Reason for Consult  discharge planning    Preferred Language  English     Used During This Interaction  no       Contact Information    Permission Granted to Share Info With          Functional Status     Row Name 06/14/21 1434       Functional Status    Usual Activity Tolerance  moderate    Current Activity Tolerance  moderate       Functional Status, IADL    Medications  independent    Meal Preparation  assistive person    Housekeeping  assistive person    Laundry  assistive person    Shopping  assistive person    IADL Comments  Patient's spouse reported she does the cooking, cleaning, driving, etc.        Met with patient in room wearing PPE: mask/goggles.      Maintained distance greater than six feet and spent less than 15 minutes in the room.      Krista Sewell RN     Office Phone: [phone]  Office Cell: [phone]

## 2021-06-14 NOTE — DISCHARGE SUMMARY
[facility] Medicine Services  DISCHARGE SUMMARY        Prepared For PCP:  Ravi Guevara MD    Patient Name: Jason Arceo  : 1945  MRN: [id number]      Date of Admission:   2021    Date of Discharge:  2021    Length of stay:  LOS: 0 days     Hospital Course     Presenting Problem:   Melena [K92.1]  Weakness [R53.1]      Active Hospital Problems    Diagnosis  POA   • **Weakness [R53.1]  Yes     Priority: High   • Paroxysmal atrial fibrillation (CMS/HCC) [I48.0]  Yes   • Type 2 diabetes mellitus (CMS/HCC) [E11.9]  Yes   • Gastroesophageal reflux disease [K21.9]  Yes   • BPH (benign prostatic hyperplasia) [N40.0]  Yes   • History of arterial thrombosis [Z86.718]  Not Applicable   • History of traumatic brain injury [Z87.820]  Not Applicable   • Complex partial epilepsy (CMS/HCC) [G40.209]  Yes   • Essential hypertension [I10]  Yes      Resolved Hospital Problems   No resolved problems to display.          Recurrent GI bleeding  History of colon polyps and hemorrhoids  Melanotic stool versus black stool from iron therapy  -Hold aspirin  -GI consulted     Chronic anemia likely secondary to CKD  -Hemoglobin 10.8 compared to 10.6 05/15/2021  -Continue iron and folic acid  -Clear liquid diet  -Serial H&H stable     Chronic kidney disease stage IIIa secondary to hypertension and diabetes  -Creatinine 1.51 on admission compared to 1.31 on 05/15/2021 with baseline creatinine of 1.3  -Avoid nephrotoxic medications  -Monitor     Diabetes mellitus type 2  -Hold Metformin  -SSI     Essential hypertension, chronic and controlled  -Continue losartan     History of atrial fibrillation status post cardioversion  -Patient's wife reports that he was taken off of oral anticoagulants several months after successful cardioversion  -Continue amiodarone     Coronary artery disease status post MI  -Hold aspirin due to GI bleeding     Chronic systolic congestive heart failure secondary to  pulmonary hypertension  -Most recent echo as above     Traumatic brain injury  Seizure disorder  -Continue Topamax     GERD   -Continue Dexilant     Rheumatoid arthritis  -Continue methotrexate     BPH  -Continue tamsulosin       Hospital Course:  Jason Arceo is a 76 y.o. male with a history of diabetes, CAD, atrial fibrillation, HTN, GERD and traumatic brain injury with short-term memory loss who presents to [facility] complaining of generalized weakness and black stool that began the day of admission.  The patient's wife reports that that he had had intermittent scant bleeding per rectum over the last several days which was attributable to his history of hemorrhoids, but the day of admission he had a large black stool and she was concerned that it could be digested blood so she brought him to the emergency room.  He had been started on iron recently so she was not sure if it was the cause of the black stools.  Patient has had no complaints of abdominal pain, recent weight changes; no nausea or vomiting, fever, chills, sweats, shortness of breath, chest pain or cough; no swelling or recent weight changes.     6/14/21 patient seen and examined in bed no acute distress, still having so weakness.  Discussed with RN, discussed with GI.  Discussed with family members.  Patient is doing physical therapy as outpatient.  Like to return home on discharge.  Hemoglobin has been stable.  Will discharge patient home, follow with PCP in a week, and GI as outpatient.    Recommendation for Outpatient Providers:   Monitor CBC    Reasons For Change In Medications and Indications for New Medications:  Hold aspirin and Metformin      Day of Discharge     Vital Signs:   Temp:  [97.3 °F (36.3 °C)-98.4 °F (36.9 °C)] 97.3 °F (36.3 °C)  Heart Rate:  [50-62] 56  Resp:  [13-16] 13  BP: (110-136)/(42-69) 115/44     Physical Exam  Vitals and nursing note reviewed.   Constitutional:       General: He is not in acute distress.      Appearance: Normal appearance. He is well-developed. He is not ill-appearing.   HENT:      Head: Normocephalic and atraumatic.      Nose: Nose normal. No congestion or rhinorrhea.      Mouth/Throat:      Mouth: Mucous membranes are moist.      Pharynx: No oropharyngeal exudate.   Eyes:      General:         Right eye: No discharge.      Extraocular Movements: Extraocular movements intact.      Conjunctiva/sclera: Conjunctivae normal.      Pupils: Pupils are equal, round, and reactive to light.   Neck:      Thyroid: No thyromegaly.      Vascular: No JVD.      Trachea: No tracheal deviation.   Cardiovascular:      Rate and Rhythm: Normal rate and regular rhythm.      Pulses: Normal pulses.      Heart sounds: Normal heart sounds. No murmur heard.     Pulmonary:      Effort: Pulmonary effort is normal. No respiratory distress.      Breath sounds: Normal breath sounds.   Abdominal:      General: Bowel sounds are normal. There is no distension.      Palpations: Abdomen is soft.   Musculoskeletal:         General: No swelling. Normal range of motion.      Cervical back: Normal range of motion and neck supple. No rigidity. No muscular tenderness.   Skin:     General: Skin is warm and dry.      Coloration: Skin is not jaundiced.   Neurological:      General: No focal deficit present.      Mental Status: He is alert and oriented to person, place, and time. Mental status is at baseline.      Cranial Nerves: No cranial nerve deficit.      Motor: No abnormal muscle tone.   Psychiatric:         Mood and Affect: Mood normal.         Behavior: Behavior normal.         Thought Content: Thought content normal.         Judgment: Judgment normal.         Pertinent  and/or Most Recent Results     Results from last 7 days   Lab Units 06/14/21  0806 06/14/21  0005 06/13/21  1132   WBC 10*3/mm3 7.50  --  8.70   HEMOGLOBIN g/dL 10.2*  10.2* 9.4* 10.8*   HEMATOCRIT % 30.5*  30.5* 28.3* 32.6*   PLATELETS 10*3/mm3 195  --  191   SODIUM  mmol/L 139  --  140   POTASSIUM mmol/L 3.8  --  4.2   CHLORIDE mmol/L 105  --  105   CO2 mmol/L 24.0  --  22.0   BUN mg/dL 17  --  18   CREATININE mg/dL 1.46*  --  1.51*   GLUCOSE mg/dL 91  --  106*   CALCIUM mg/dL 9.2  --  10.0     Results from last 7 days   Lab Units 06/13/21  1132   BILIRUBIN mg/dL 1.0   ALK PHOS U/L 146*   ALT (SGPT) U/L 16   AST (SGOT) U/L 22   PROTIME Seconds 11.7   INR  1.07   APTT seconds 23.4*           Invalid input(s): TG, LDLCALC, LDLREALC        Brief Urine Lab Results  (Last result in the past 365 days)      Color   Clarity   Blood   Leuk Est   Nitrite   Protein   CREAT   Urine HCG        05/06/21 1340 Yellow Clear Negative Negative Negative Negative               Microbiology Results Abnormal     Procedure Component Value - Date/Time    COVID PRE-OP / PRE-PROCEDURE SCREENING ORDER (NO ISOLATION) - Swab, Nasopharynx [[id number]]  (Normal) Collected: 06/13/21 1505    Lab Status: Final result Specimen: Swab from Nasopharynx Updated: 06/13/21 1530    Narrative:      The following orders were created for panel order COVID PRE-OP / PRE-PROCEDURE SCREENING ORDER (NO ISOLATION) - Swab, Nasopharynx.  Procedure                               Abnormality         Status                     ---------                               -----------         ------                     COVID-19,CEPHEID,COR/REBEKAH...[[id number]]  Normal              Final result                 Please view results for these tests on the individual orders.    COVID-19,CEPHEID,COR/REBEKAH/PAD/PETE IN-HOUSE(OR EMERGENT/ADD-ON),NP SWAB IN TRANSPORT MEDIA 3-4 HR TAT, RT-PCR - Swab, Nasopharynx [[id number]]  (Normal) Collected: 06/13/21 1505    Lab Status: Final result Specimen: Swab from Nasopharynx Updated: 06/13/21 1530     COVID19 Not Detected    Narrative:      Fact sheet for providers: https://www.fda.gov/media/801730/download     Fact sheet for patients: https://www.fda.gov/media/015917/download  Fact sheet for providers:  https://www.fda.gov/media/123889/download    Fact sheet for patients: https://www.fda.gov/media/189090/download    Test performed by PCR.               Results for orders placed during the hospital encounter of 06/13/21    Duplex venous lower extremity right    Interpretation Summary  · Chronic right lower extremity deep vein thrombosis noted in the common femoral, proximal femoral and popliteal.  · Chronic right lower extremity superficial thrombophlebitis noted in the saphenofemoral junction.  · Chronic left lower extremity deep vein thrombosis noted in the common femoral.  · All other right sided veins appeared normal.      Results for orders placed during the hospital encounter of 06/13/21    Duplex venous lower extremity right    Interpretation Summary  · Chronic right lower extremity deep vein thrombosis noted in the common femoral, proximal femoral and popliteal.  · Chronic right lower extremity superficial thrombophlebitis noted in the saphenofemoral junction.  · Chronic left lower extremity deep vein thrombosis noted in the common femoral.  · All other right sided veins appeared normal.      Results for orders placed during the hospital encounter of 12/26/20    Adult Transthoracic Echo Complete W/ Cont if Necessary Per Protocol    Interpretation Summary  · Estimated left ventricular EF was in agreement with the calculated left ventricular EF. Left ventricular ejection fraction appears to be 56 - 60%. Left ventricular systolic function is normal.  · There is mild, bileaflet mitral valve thickening present.  · Left ventricular wall thickness is consistent with moderate concentric hypertrophy.  · Severe tricuspid valve regurgitation is present.  · Estimated right ventricular systolic pressure from tricuspid regurgitation is markedly elevated (>55 mmHg). Calculated right ventricular systolic pressure from tricuspid regurgitation is 62.2 mmHg.  · Left ventricular diastolic function is consistent with (grade Ia  w/high LAP) impaired relaxation.  · The right ventricular cavity is mild to moderately dilated.              Test Results Pending at Discharge        Procedures Performed           Consults:   Consults     Date and Time Order Name Status Description    6/14/2021  9:46 AM Inpatient Gastroenterology Consult Completed     6/14/2021 12:25 AM Inpatient Cardiology Consult      6/13/2021  1:12 PM Hospitalist (on-call MD unless specified) Completed     5/14/2021  7:15 PM Inpatient Hospitalist Consult Completed         ASSESSMENT:  Recurrent chronic rectal bleeding  Chronic anemia/normocytic  History of CAD/stent/CABG -on aspirin, history of A. Fib/CHF  CKD  Diabetes  History of TBI with short-term memory loss  Chronic DVT     PLAN:  No evidence of active GI bleeding at this time.  Nurse reports black bowel movement with green toilet water.  He has a history of recurrent chronic rectal bleeding likely secondary to hemorrhoids with previous hemorrhoid banding.  Wife at bedside reports recent hemoglobin of 8 with improvement of 10.2 on oral iron.  Continue iron supplementation and avoid NSAIDs.  Okay to advance diet and discharge home from GI standpoint if otherwise medically stable.  Okay for MiraLAX as needed for mild constipation on iron.  He has follow-up with GI as outpatient on Friday and will keep this appointment for further evaluation and treatment if needed.  Discussed with bedside RN.     Discussed the patients findings and my recommendations with the patient.     We appreciate the referral     SAMAN Ackerman  06/14/21  12:07 EDT    Discharge Details        Discharge Medications      New Medications      Instructions Start Date   sucralfate 1 g tablet  Commonly known as: CARAFATE   1 g, Oral, 3 Times Daily - RT         Changes to Medications      Instructions Start Date   aspirin 81 MG chewable tablet  What changed: These instructions start on June 19, 2021. If you are unsure what to do until then, ask your  doctor or other care provider.   81 mg, Oral, Nightly, Stop 5 days prior to surgery- notified wife   Start Date: June 19, 2021     losartan 25 MG tablet  Commonly known as: COZAAR  What changed: when to take this   25 mg, Oral, Every 24 Hours Scheduled         Continue These Medications      Instructions Start Date   amiodarone 200 MG tablet  Commonly known as: PACERONE   100 mg, Oral, 2 Times Daily, Take dos      dexlansoprazole 60 MG capsule  Commonly known as: DEXILANT   60 mg, Oral, Daily      ferrous sulfate 324 (65 Fe) MG tablet delayed-release EC tablet   324 mg, Oral, 2 Times Daily With Meals      folic acid 1 MG tablet  Commonly known as: FOLVITE   1,000 mcg, Oral, Daily      ICaps Areds 2 capsule   Oral, Stop 5-7 for surgery      methotrexate 2.5 MG tablet   2.5 mg, Oral, Weekly, 10 tablets on Sunday      multivitamin tablet tablet  Commonly known as: THERAGRAN   1 tablet, Oral, Daily, Stop 5-7      NON FORMULARY   2 tablets, Oral, Daily, Focus factor       potassium chloride 10 MEQ CR tablet  Commonly known as: K-DUR,KLOR-CON   10 mEq, Oral, Every Morning, Take dos      tamsulosin 0.4 MG capsule 24 hr capsule  Commonly known as: FLOMAX   1 capsule, Oral, Daily      topiramate 25 MG tablet  Commonly known as: TOPAMAX   25 mg, Oral, 2 Times Daily         Stop These Medications    metFORMIN 500 MG tablet  Commonly known as: GLUCOPHAGE            Allergies   Allergen Reactions   • Benadryl [Diphenhydramine] Other (See Comments)     Cause pt to be hard to waken   • Demerol [Meperidine] Hallucinations   • Dilaudid [Hydromorphone] Hallucinations   • Lacosamide    • Levetiracetam    • Lisinopril    • Morphine Hallucinations   • Oxycodone    • Phenergan [Promethazine Hcl] Other (See Comments)     Due to Brain injury   • Sulfa Antibiotics Other (See Comments)     Was instructed not to mix with methotrexate   • Trazodone      PAINFUL ERECTION         Discharge Disposition:  Home or Self  Care    Diet:  Hospital:  Diet Order   Procedures   • Diet Cardiac, Diabetic/Consistent Carbs; Healthy Heart; Diabetic - Consistent Carb         Discharge Activity:   Activity Instructions     Gradually Increase Activity Until at Pre-Hospitalization Level              CODE STATUS:    Code Status and Medical Interventions:   Ordered at: 06/13/21 1543     Code Status:    CPR     Medical Interventions (Level of Support Prior to Arrest):    Full         Follow-up Appointments  No future appointments.    Additional Instructions for the Follow-ups that You Need to Schedule     Discharge Follow-up with PCP   As directed       Currently Documented PCP:    Ravi Guevara MD    PCP Phone Number:    [phone]     Follow Up Details: 1 week         Discharge Follow-up with Specified Provider: GI; 1 Week   As directed      To: GI    Follow Up: 1 Week                 Condition on Discharge:      Stable      This patient has been examined wearing appropriate Personal Protective Equipment and discussed with rn. 06/14/21      Electronically signed by Roger Fernandes MD, 06/14/21, 1:26 PM EDT.      Time: I spent  34  minutes on this discharge activity which included face-to-face encounter with the patient/reviewing the data in the system/coordination of the care with the nursing staff as well as consultants/documentation/entering orders.

## 2021-06-14 NOTE — CONSULTS
"GI CONSULT  NOTE:    Referring Provider:  Dr. Fernandes    Chief complaint: GI bleed    Subjective . \"  I had a black bowel movement\"    History of present illness: Jason Arceo is a 76 y.o. male who has a history of recurrent chronic rectal bleeding with previous hemorrhoid banding, CAD/CABG, A. fib, CKD, rheumatoid arthritis, TBI with short-term memory loss and diabetes.  He is s/p knee surgery on May 14.  Information is obtained from wife at bedside, nurse and review of chart as patient has short-term memory loss issues.  Wife reports history of recurrent bright red blood per rectum for years and has had previous hemorrhoid banding with some improvement.  He also has been taken off anticoagulation other than a daily aspirin which has improved his bleeding as well.  He typically has multiple soft bowel movements per day but did have some constipation recently and has been taking MiraLAX.  He was started on oral iron a couple weeks ago and did start having black stool, RN reports green/black stool today.  No abdominal pain but did have some mild nausea that since has improved and he is tolerating diet.  He denies abdominal pain.  No NSAID use.  Wife reports hemoglobin recently 8 and now up to 10.2 on oral iron supplementation.  He has had some weakness.    Endo History:  Hemorrhoid banding by Dr. Torres- December 2019/January 2020 9/2018 colonoscopy by Dr. Vinson -luis, hemorrhoids  9/2013 colonoscopy by Dr. Vinson -luis and hemorrhoids    Past Medical History:  Past Medical History:   Diagnosis Date   • Anxiety    • Atherosclerosis of coronary artery 12/26/2020    Alejandra Wallace at [address]   • Atrial fibrillation and flutter (CMS/HCC) 12/26/2020   • BPH (benign prostatic hyperplasia) 12/26/2020   • Confused    • Depression    • Diabetes mellitus (CMS/[facility])    • Gastroesophageal reflux disease 12/26/2020   • Heart disease    • Hemorrhage     rectal.    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   • Traumatic brain injury (CMS/[facility]) 2010       Past Surgical History:  Past Surgical History:   Procedure Laterality Date   • CARDIAC SURGERY  2006    triple bypass   • CARDIOVERSION  11/2020   • KNEE SURGERY     • TOTAL HIP ARTHROPLASTY Bilateral    • TOTAL HIP ARTHROPLASTY REVISION Right 12/29/2020    Procedure: right posterior TOTAL HIP ARTHROPLASTY REVISION, pegboard lateral;  Surgeon: Damian Quijano II, MD;  Location: [facility] MAIN OR;  Service: Orthopedics;  Laterality: Right;   • TOTAL KNEE ARTHROPLASTY Right 5/14/2021    Procedure: TOTAL KNEE ARTHROPLASTY;  Surgeon: Damian Quijano II, MD;  Location: [facility] MAIN OR;  Service: Orthopedics;  Laterality: Right;       Social History:  Social History     Tobacco Use   • Smoking status: Never Smoker   Substance Use Topics   • Alcohol use: No   • Drug use: No   None    Family History:  Family History   Problem Relation Age of Onset   • Stroke Mother    • Diabetes Father        Medications:  Medications Prior to Admission   Medication Sig Dispense Refill Last Dose   • amiodarone (PACERONE) 200 MG tablet Take 100 mg by mouth 2 (Two) Times a Day. Take dos    6/13/2021 at 0800   • aspirin 81 MG chewable tablet Chew 81 mg Every Night. Stop 5 days prior to surgery- notified wife   6/12/2021 at Unknown time   • dexlansoprazole (DEXILANT) 60 MG capsule Take 60 mg by mouth Daily.   6/12/2021 at Unknown time   • ferrous sulfate 324 (65 Fe) MG tablet delayed-release EC tablet Take 324 mg by mouth 2 (Two) Times a Day With Meals.      • folic acid (FOLVITE) 1 MG tablet Take 1,000 mcg by mouth Daily.  11 6/12/2021 at Unknown time   • losartan (COZAAR) 25  MG tablet Take 1 tablet by mouth Daily. (Patient taking differently: Take 25 mg by mouth every night at bedtime.)   6/12/2021 at Unknown time   • metFORMIN (GLUCOPHAGE) 500 MG tablet Take 500 mg by mouth every night at bedtime. LD 5-12 before 1630   6/12/2021 at Unknown time   • methotrexate 2.5 MG tablet Take 2.5 mg by mouth 1 (One) Time Per Week. 10 tablets on Sunday   Past Week at Unknown time   • Multiple Vitamin (MULTI VITAMIN DAILY PO) Take 1 tablet by mouth Daily. Stop 5-7   6/13/2021 at 0800   • Multiple Vitamins-Minerals (ICAPS AREDS 2) capsule Take  by mouth. Stop 5-7 for surgery   6/13/2021 at 0800   • NON FORMULARY Take 2 tablets by mouth Daily. Focus factor   6/13/2021 at 0800   • potassium chloride (K-DUR,KLOR-CON) 10 MEQ CR tablet Take 10 mEq by mouth Every Morning. Take dos   6/13/2021 at 0800   • tamsulosin (FLOMAX) 0.4 MG capsule 24 hr capsule Take 1 capsule by mouth Daily.   6/13/2021 at 0800   • topiramate (TOPAMAX) 25 MG tablet Take 1 tablet by mouth 2 (Two) Times a Day for 90 days. 180 tablet 3 6/13/2021 at 0800       Scheduled Meds:amiodarone, 100 mg, Oral, Q12H  ferrous sulfate, 324 mg, Oral, BID With Meals  folic acid, 1,000 mcg, Oral, Daily  losartan, 25 mg, Oral, Q24H  multivitamin with minerals, 1 tablet, Oral, Daily  pantoprazole, 40 mg, Oral, BID AC  potassium chloride, 10 mEq, Oral, QAM  sodium chloride, 3 mL, Intravenous, Q12H  sucralfate, 1 g, Oral, 4x Daily AC & at Bedtime  tamsulosin, 0.4 mg, Oral, Daily  topiramate, 25 mg, Oral, BID      Continuous Infusions:   PRN Meds:.•  acetaminophen **OR** acetaminophen **OR** acetaminophen  •  calcium carbonate  •  melatonin  •  nitroglycerin  •  ondansetron **OR** ondansetron  •  sodium chloride  •  sodium chloride    ALLERGIES:  Benadryl [diphenhydramine], Demerol [meperidine], Dilaudid [hydromorphone], Lacosamide, Levetiracetam, Lisinopril, Morphine, Oxycodone, Phenergan [promethazine hcl], Sulfa antibiotics, and Trazodone    ROS:  The  following systems were reviewed   Constitution:  No fevers, chills, no unintentional weight loss  Skin: no rash, no jaundice  Eyes:  No blurry vision, no eye pain  HENT:  No change in hearing or smell  Resp:  No dyspnea or cough  CV:  No chest pain or palpitations  :  No dysuria, hematuria  Musculoskeletal:  No leg cramps, knee pain  Neuro:  No tremor, no numbness  Psych:  No depression or confusion    Objective Resting in chair, no acute distress, wife in room    Vital Signs:   Vitals:    06/14/21 0359 06/14/21 0600 06/14/21 0755 06/14/21 1139   BP: 110/55  126/69 115/44   BP Location: Left arm  Right arm Right arm   Patient Position: Lying  Lying Sitting   Pulse: 52  56 56   Resp: 16  15 13   Temp: 98.3 °F (36.8 °C)  98.4 °F (36.9 °C) 97.3 °F (36.3 °C)   TempSrc: Oral  Oral Oral   SpO2: 100%  98% 98%   Weight:  95.8 kg (211 lb 3.2 oz)     Height:           Physical Exam:       General Appearance:    Awake and alert, in no acute distress   Head:    Normocephalic, without obvious abnormality, atraumatic   Throat:   No oral lesions, no thrush, oral mucosa moist   Lungs:     Respirations regular, even and unlabored   Chest Wall:    No abnormalities observed   Abdomen:     Soft, non-tender, nondistended   Rectal:     Deferred   Extremities:   Moves all extremities, no cyanosis, right knee healed surgical incision noted       Skin:   No rash, no jaundice, normal palpation       Neurologic:  Chronic short-term memory loss       Results Review:   I reviewed the patient's labs and imaging.  CBC    Results from last 7 days   Lab Units 06/14/21  0806 06/14/21  0005 06/13/21  1132   WBC 10*3/mm3 7.50  --  8.70   HEMOGLOBIN g/dL 10.2*  10.2* 9.4* 10.8*   PLATELETS 10*3/mm3 195  --  191     CMP   Results from last 7 days   Lab Units 06/14/21  0806 06/13/21  1132   SODIUM mmol/L 139 140   POTASSIUM mmol/L 3.8 4.2   CHLORIDE mmol/L 105 105   CO2 mmol/L 24.0 22.0   BUN mg/dL 17 18   CREATININE mg/dL 1.46* 1.51*   GLUCOSE mg/dL  91 106*   ALBUMIN g/dL  --  4.20   BILIRUBIN mg/dL  --  1.0   ALK PHOS U/L  --  146*   AST (SGOT) U/L  --  22   ALT (SGPT) U/L  --  16     Cr Clearance Estimated Creatinine Clearance: 58.3 mL/min (A) (by C-G formula based on SCr of 1.46 mg/dL (H)).  Coag   Results from last 7 days   Lab Units 06/13/21  1132   INR  1.07   APTT seconds 23.4*     HbA1C   Lab Results   Component Value Date    HGBA1C 5.2 01/21/2021    HGBA1C 5.9 (H) 12/26/2020     Blood Glucose   Glucose   Date/Time Value Ref Range Status   06/13/2021 2043 110 (H) 70 - 105 mg/dL Final     Comment:     Serial Number: [id number]:  [id number]     Infection     UA      Radiology(recent) No radiology results for the last day       ASSESSMENT:  Recurrent chronic rectal bleeding  Chronic anemia/normocytic  History of CAD/stent/CABG -on aspirin, history of A. Fib/CHF  CKD  Diabetes  History of TBI with short-term memory loss  Chronic DVT    PLAN:  No evidence of active GI bleeding at this time.  Nurse reports black bowel movement with green toilet water.  He has a history of recurrent chronic rectal bleeding likely secondary to hemorrhoids with previous hemorrhoid banding.  Wife at bedside reports recent hemoglobin of 8 with improvement of 10.2 on oral iron.  Continue iron supplementation and avoid NSAIDs.  Okay to advance diet and discharge home from GI standpoint if otherwise medically stable.  Okay for MiraLAX as needed for mild constipation on iron.  He has follow-up with GI as outpatient on Friday and will keep this appointment for further evaluation and treatment if needed.  Discussed with bedside RN.    Discussed the patients findings and my recommendations with the patient.    We appreciate the referral    Ryann Nugent, SAMAN  06/14/21  12:07 EDT

## 2021-06-14 NOTE — PLAN OF CARE
Goal Outcome Evaluation:           Progress: improving  Outcome Summary: Patient has had no bloody stools over night, He has not complained of any pain. He has been up walking in the room. His HR has been bianca over night. Orders to hold heart meds. Will continue to monitor.

## 2021-07-09 ENCOUNTER — LAB (OUTPATIENT)
Dept: LAB | Facility: HOSPITAL | Age: 76
End: 2021-07-09

## 2021-07-09 ENCOUNTER — TRANSCRIBE ORDERS (OUTPATIENT)
Dept: ADMINISTRATIVE | Facility: HOSPITAL | Age: 76
End: 2021-07-09

## 2021-07-09 ENCOUNTER — HOSPITAL ENCOUNTER (OUTPATIENT)
Dept: CARDIOLOGY | Facility: HOSPITAL | Age: 76
Discharge: HOME | End: 2021-07-09

## 2021-07-09 DIAGNOSIS — Z01.818 PREOP TESTING: ICD-10-CM

## 2021-07-09 DIAGNOSIS — M25.561 RIGHT KNEE PAIN, UNSPECIFIED CHRONICITY: ICD-10-CM

## 2021-07-09 DIAGNOSIS — M25.561 RIGHT KNEE PAIN, UNSPECIFIED CHRONICITY: Primary | ICD-10-CM

## 2021-07-09 LAB
ALBUMIN SERPL-MCNC: 4.5 G/DL (ref 3.5–5.2)
ALBUMIN/GLOB SERPL: 1.5 G/DL
ALP SERPL-CCNC: 115 U/L (ref 39–117)
ALT SERPL W P-5'-P-CCNC: 16 U/L (ref 1–41)
ANION GAP SERPL CALCULATED.3IONS-SCNC: 9.8 MMOL/L (ref 5–15)
AST SERPL-CCNC: 22 U/L (ref 1–40)
BASOPHILS # BLD AUTO: 0.03 10*3/MM3 (ref 0–0.2)
BASOPHILS NFR BLD AUTO: 0.6 % (ref 0–1.5)
BILIRUB SERPL-MCNC: 0.6 MG/DL (ref 0–1.2)
BUN SERPL-MCNC: 16 MG/DL (ref 8–23)
BUN/CREAT SERPL: 12.4 (ref 7–25)
CALCIUM SPEC-SCNC: 9.3 MG/DL (ref 8.6–10.5)
CHLORIDE SERPL-SCNC: 106 MMOL/L (ref 98–107)
CO2 SERPL-SCNC: 23.2 MMOL/L (ref 22–29)
CREAT SERPL-MCNC: 1.29 MG/DL (ref 0.76–1.27)
DEPRECATED RDW RBC AUTO: 55.1 FL (ref 37–54)
EOSINOPHIL # BLD AUTO: 0.08 10*3/MM3 (ref 0–0.4)
EOSINOPHIL NFR BLD AUTO: 1.6 % (ref 0.3–6.2)
ERYTHROCYTE [DISTWIDTH] IN BLOOD BY AUTOMATED COUNT: 15.6 % (ref 12.3–15.4)
GFR SERPL CREATININE-BSD FRML MDRD: 54 ML/MIN/1.73
GLOBULIN UR ELPH-MCNC: 3 GM/DL
GLUCOSE SERPL-MCNC: 98 MG/DL (ref 65–99)
HCT VFR BLD AUTO: 33.8 % (ref 37.5–51)
HGB BLD-MCNC: 11 G/DL (ref 13–17.7)
IMM GRANULOCYTES # BLD AUTO: 0.03 10*3/MM3 (ref 0–0.05)
IMM GRANULOCYTES NFR BLD AUTO: 0.6 % (ref 0–0.5)
LYMPHOCYTES # BLD AUTO: 1.17 10*3/MM3 (ref 0.7–3.1)
LYMPHOCYTES NFR BLD AUTO: 22.9 % (ref 19.6–45.3)
MCH RBC QN AUTO: 32.4 PG (ref 26.6–33)
MCHC RBC AUTO-ENTMCNC: 32.5 G/DL (ref 31.5–35.7)
MCV RBC AUTO: 99.4 FL (ref 79–97)
MONOCYTES # BLD AUTO: 0.24 10*3/MM3 (ref 0.1–0.9)
MONOCYTES NFR BLD AUTO: 4.7 % (ref 5–12)
NEUTROPHILS NFR BLD AUTO: 3.55 10*3/MM3 (ref 1.7–7)
NEUTROPHILS NFR BLD AUTO: 69.6 % (ref 42.7–76)
NRBC BLD AUTO-RTO: 0 /100 WBC (ref 0–0.2)
PLATELET # BLD AUTO: 199 10*3/MM3 (ref 140–450)
PMV BLD AUTO: 10.5 FL (ref 6–12)
POTASSIUM SERPL-SCNC: 4.5 MMOL/L (ref 3.5–5.2)
PROT SERPL-MCNC: 7.5 G/DL (ref 6–8.5)
RBC # BLD AUTO: 3.4 10*6/MM3 (ref 4.14–5.8)
SODIUM SERPL-SCNC: 139 MMOL/L (ref 136–145)
WBC # BLD AUTO: 5.1 10*3/MM3 (ref 3.4–10.8)

## 2021-07-09 PROCEDURE — 80053 COMPREHEN METABOLIC PANEL: CPT

## 2021-07-09 PROCEDURE — C9803 HOPD COVID-19 SPEC COLLECT: HCPCS

## 2021-07-09 PROCEDURE — U0005 INFEC AGEN DETEC AMPLI PROBE: HCPCS

## 2021-07-09 PROCEDURE — U0004 COV-19 TEST NON-CDC HGH THRU: HCPCS

## 2021-07-09 PROCEDURE — 93005 ELECTROCARDIOGRAM TRACING: CPT | Performed by: ORTHOPAEDIC SURGERY

## 2021-07-09 PROCEDURE — 85025 COMPLETE CBC W/AUTO DIFF WBC: CPT

## 2021-07-09 PROCEDURE — 93010 ELECTROCARDIOGRAM REPORT: CPT | Performed by: INTERNAL MEDICINE

## 2021-07-09 PROCEDURE — 36415 COLL VENOUS BLD VENIPUNCTURE: CPT

## 2021-07-10 LAB
QT INTERVAL: 483 MS
SARS-COV-2 ORF1AB RESP QL NAA+PROBE: NOT DETECTED

## 2021-09-28 ENCOUNTER — TRANSCRIBE ORDERS (OUTPATIENT)
Dept: ADMINISTRATIVE | Facility: HOSPITAL | Age: 76
End: 2021-09-28

## 2021-09-28 ENCOUNTER — HOSPITAL ENCOUNTER (OUTPATIENT)
Dept: ULTRASOUND IMAGING | Facility: HOSPITAL | Age: 76
Discharge: HOME | End: 2021-09-28
Admitting: FAMILY MEDICINE

## 2021-09-28 DIAGNOSIS — M79.89 PAIN AND SWELLING OF LOWER EXTREMITY, LEFT: Primary | ICD-10-CM

## 2021-09-28 DIAGNOSIS — M79.605 PAIN AND SWELLING OF LOWER EXTREMITY, LEFT: Primary | ICD-10-CM

## 2021-09-28 DIAGNOSIS — M79.605 PAIN AND SWELLING OF LOWER EXTREMITY, LEFT: ICD-10-CM

## 2021-09-28 DIAGNOSIS — M79.89 PAIN AND SWELLING OF LOWER EXTREMITY, LEFT: ICD-10-CM

## 2021-09-28 PROCEDURE — 93971 EXTREMITY STUDY: CPT

## 2021-10-11 ENCOUNTER — CONSULT (OUTPATIENT)
Dept: ONCOLOGY | Facility: HOSPITAL | Age: 76
End: 2021-10-11

## 2021-10-11 VITALS
RESPIRATION RATE: 18 BRPM | BODY MASS INDEX: 28.19 KG/M2 | SYSTOLIC BLOOD PRESSURE: 136 MMHG | OXYGEN SATURATION: 99 % | WEIGHT: 219.58 LBS | DIASTOLIC BLOOD PRESSURE: 63 MMHG | TEMPERATURE: 97.7 F | HEART RATE: 66 BPM

## 2021-10-11 DIAGNOSIS — N18.31 STAGE 3A CHRONIC KIDNEY DISEASE (HCC): ICD-10-CM

## 2021-10-11 DIAGNOSIS — I82.513 CHRONIC DEEP VEIN THROMBOSIS (DVT) OF FEMORAL VEIN OF BOTH LOWER EXTREMITIES (HCC): Primary | ICD-10-CM

## 2021-10-11 PROBLEM — I70.293: Status: ACTIVE | Noted: 2021-10-11

## 2021-10-11 PROBLEM — I73.9 PERIPHERAL VASCULAR DISEASE (HCC): Status: ACTIVE | Noted: 2021-10-11

## 2021-10-11 PROBLEM — L60.0 INGROWN NAIL: Status: ACTIVE | Noted: 2021-10-11

## 2021-10-11 PROBLEM — K92.1 HEMATOCHEZIA: Status: ACTIVE | Noted: 2021-06-18

## 2021-10-11 PROBLEM — M54.9 CHRONIC BACK PAIN: Status: ACTIVE | Noted: 2021-10-11

## 2021-10-11 PROBLEM — E11.40 NEUROPATHY DUE TO TYPE 2 DIABETES MELLITUS (HCC): Status: ACTIVE | Noted: 2021-10-11

## 2021-10-11 PROBLEM — K40.90 RIGHT INGUINAL HERNIA: Status: ACTIVE | Noted: 2021-06-18

## 2021-10-11 PROBLEM — N18.9 CKD (CHRONIC KIDNEY DISEASE): Status: ACTIVE | Noted: 2021-10-11

## 2021-10-11 PROBLEM — Z01.810 PREOPERATIVE CARDIOVASCULAR EXAMINATION: Status: ACTIVE | Noted: 2021-04-28

## 2021-10-11 PROBLEM — I25.10 ATHEROSCLEROSIS OF CORONARY ARTERY: Status: ACTIVE | Noted: 2021-10-11

## 2021-10-11 PROBLEM — M20.42 ACQUIRED HAMMER TOE OF LEFT FOOT: Status: ACTIVE | Noted: 2021-10-11

## 2021-10-11 PROBLEM — G60.9 HEREDITARY AND IDIOPATHIC NEUROPATHY, UNSPECIFIED: Status: ACTIVE | Noted: 2021-10-11

## 2021-10-11 PROBLEM — G89.29 CHRONIC BACK PAIN: Status: ACTIVE | Noted: 2021-10-11

## 2021-10-11 PROBLEM — B35.1 ONYCHOMYCOSIS DUE TO DERMATOPHYTE: Status: ACTIVE | Noted: 2021-10-11

## 2021-10-11 PROBLEM — I38 HEART VALVE DISORDER: Status: ACTIVE | Noted: 2020-09-11

## 2021-10-11 PROBLEM — I48.19 PERSISTENT ATRIAL FIBRILLATION (HCC): Status: ACTIVE | Noted: 2020-11-20

## 2021-10-11 PROCEDURE — 99204 OFFICE O/P NEW MOD 45 MIN: CPT | Performed by: INTERNAL MEDICINE

## 2021-10-11 PROCEDURE — G0463 HOSPITAL OUTPT CLINIC VISIT: HCPCS | Performed by: INTERNAL MEDICINE

## 2021-10-11 RX ORDER — HYDROCODONE BITARTRATE AND ACETAMINOPHEN 5; 325 MG/1; MG/1
1 TABLET ORAL EVERY 4 HOURS PRN
COMMUNITY
Start: 2021-07-15 | End: 2021-10-11

## 2021-10-11 RX ORDER — LOSARTAN POTASSIUM 50 MG/1
TABLET ORAL
COMMUNITY
Start: 2021-09-03 | End: 2021-10-11

## 2021-10-11 RX ORDER — POLYETHYLENE GLYCOL 3350 17 G/17G
17 POWDER, FOR SOLUTION ORAL
COMMUNITY
End: 2022-11-15

## 2021-10-11 RX ORDER — LEVOFLOXACIN 500 MG/1
TABLET, FILM COATED ORAL
COMMUNITY
Start: 2021-09-09 | End: 2021-10-11

## 2021-10-11 RX ORDER — FERROUS SULFATE 325(65) MG
1 TABLET ORAL 2 TIMES DAILY
COMMUNITY
Start: 2021-10-02 | End: 2022-11-15

## 2021-10-11 RX ORDER — DIPHENOXYLATE HYDROCHLORIDE AND ATROPINE SULFATE 2.5; .025 MG/1; MG/1
TABLET ORAL
COMMUNITY

## 2021-10-11 RX ORDER — TOPIRAMATE 100 MG/1
TABLET, FILM COATED ORAL
COMMUNITY
End: 2022-11-15

## 2021-10-11 RX ORDER — CEFDINIR 300 MG/1
CAPSULE ORAL
COMMUNITY
Start: 2021-08-17 | End: 2022-11-15

## 2021-10-11 RX ORDER — APIXABAN 5 MG/1
TABLET, FILM COATED ORAL
COMMUNITY
Start: 2021-09-28 | End: 2021-10-11 | Stop reason: DRUGHIGH

## 2021-10-11 RX ORDER — PANTOPRAZOLE SODIUM 20 MG/1
TABLET, DELAYED RELEASE ORAL
COMMUNITY
End: 2021-10-11

## 2021-10-11 RX ORDER — FINASTERIDE 5 MG/1
TABLET, FILM COATED ORAL
COMMUNITY
Start: 2021-09-01 | End: 2021-10-11

## 2021-10-11 RX ORDER — AMIODARONE HYDROCHLORIDE 400 MG/1
TABLET ORAL
COMMUNITY
Start: 2021-10-01 | End: 2022-11-15

## 2021-10-11 RX ORDER — TAMSULOSIN HYDROCHLORIDE 0.4 MG/1
CAPSULE ORAL
COMMUNITY
End: 2021-10-11

## 2021-10-11 NOTE — PROGRESS NOTES
Patient   Jason Arceo    Location  [facility] HEMATOLOGY & ONCOLOGY    Chief Complaint  DVT RECURRENT (New PT )    Referring Provider: Kalli Hammond*  PCP: Ravi Guevara MD    Subjective          Oncology/Hematology History Overview Note     LE DVT:  - Pt had a TBI in 2010 and was in a coma for two weeks.  He developed blood clots in both upper legs and was on anti-coagulation for sometime after and then taken off.    - He has been on blood thinners for various reasons over the years such as atrial fibrillation and developed serious GI bleeding.    - past colonoscopies revealed significant internal hemorrhoids (per pt)   - Bilateral lower extremity venous duplex on 6/13/21: chronic DVT in the Right common femoral, proximal femoral and popliteal as well as left common femoral.   - Pt reports falling/stepping into a small ditch near his house and developing left leg swelling after.    -Left lower extremity Doppler ultrasound on 9/28/2021: Thrombus in the left common femoral, distal superficial femoral, and popliteal veins with some residual flow           History of Present Illness  Patient comes in today for consultation regarding recurrent DVT.  I reviewed the patient's past Doppler ultrasounds of his lower extremity.  He does not have a specific diagnosis of hypercoagulability but does have numerous comorbidities that put him at high risk including diabetes and peripheral vascular disease.    Patient had bilateral DVTs while in a coma in 2010 after TBI.  He has been on anticoagulation off and on since that time for various reasons including atrial fibrillation.  While on anticoagulation he has had significant GI bleeding.  Most recently he stepped into a ditch and had some lower extremity swelling after.  Repeat Doppler ultrasound showed concern for new thrombosis.  He was put back on Eliquis 5 mg twice daily.  He started this 1 week ago but does not yet have notable GI  bleeding.      Review of Systems   Constitutional: Positive for fatigue. Negative for appetite change, diaphoresis, fever, unexpected weight gain and unexpected weight loss.   HENT: Negative for hearing loss, sore throat and voice change.    Eyes: Negative for blurred vision, double vision, pain, redness and visual disturbance.   Respiratory: Negative for cough, shortness of breath and wheezing.    Cardiovascular: Negative for chest pain, palpitations and leg swelling.   Endocrine: Negative for cold intolerance, heat intolerance, polydipsia and polyuria.   Genitourinary: Negative for decreased urine volume, difficulty urinating, frequency and urinary incontinence.   Musculoskeletal: Negative for arthralgias, back pain, joint swelling and myalgias.   Skin: Negative for color change, rash, skin lesions and bruise.   Neurological: Negative for dizziness, seizures, numbness and headache.   Hematological: Negative for adenopathy. Does not bruise/bleed easily.   Psychiatric/Behavioral: Negative for depressed mood. The patient is not nervous/anxious.        Past Medical History:   Diagnosis Date   • Anxiety    • Atherosclerosis of coronary artery 12/26/2020    Alejandra Wallace at [address]   • Atrial fibrillation and flutter ([facility]) 12/26/2020   • BPH (benign prostatic hyperplasia) 12/26/2020   • Confused    • Depression    • Diabetes mellitus ([facility])    • Gastroesophageal reflux disease 12/26/2020   • Heart disease    • Hemorrhage     rectal.    • History of cardioversion 11/17/2020   • Hypertension 8/20/2014   • MI (myocardial infarction) ([facility]) 2006   • Osteoarthritis    • Panic attack    • Presence of IVC filter     wynn filter   • Rheumatoid arthritis ([facility])    • S/P hip replacement 10/16/2015   • Seizure ([facility])     x 1 only   • Thrombosis of iliac artery ([facility]) 12/26/2020   • Traumatic brain injury ([facility]) 2010     Past Surgical History:   Procedure Laterality Date   • CARDIAC SURGERY  2006    triple bypass   •  CARDIOVERSION  11/2020   • KNEE SURGERY     • TOTAL HIP ARTHROPLASTY Bilateral    • TOTAL HIP ARTHROPLASTY REVISION Right 12/29/2020    Procedure: right posterior TOTAL HIP ARTHROPLASTY REVISION, pegboard lateral;  Surgeon: Damian Quijano II, MD;  Location: [facility] MAIN OR;  Service: Orthopedics;  Laterality: Right;   • TOTAL KNEE ARTHROPLASTY Right 5/14/2021    Procedure: TOTAL KNEE ARTHROPLASTY;  Surgeon: Damian Quijano II, MD;  Location: [facility] MAIN OR;  Service: Orthopedics;  Laterality: Right;     Social History     Socioeconomic History   • Marital status:    Tobacco Use   • Smoking status: Never Smoker   Substance and Sexual Activity   • Alcohol use: No   • Drug use: No   • Sexual activity: Defer     Family History   Problem Relation Age of Onset   • Stroke Mother    • Diabetes Father        Objective   Physical Exam  General: Alert, cooperative, no acute distress  Eyes: Anicteric sclera, PERRLA  Respiratory: normal respiratory effort  Cardiovascular: Both lower extremities have pitting edema and very tight, shiny skin across the legs.  The left leg is slightly larger than the right.  Skin: Normal tone, no rash, no lesions  Psychiatric: Appropriate affect, intact judgment  Neurologic: No focal sensory or motor deficits, normal cognition   Musculoskeletal: Normal muscle strength and tone  Extremities: No clubbing, cyanosis, or deformities    Vitals:    10/11/21 1414   BP: 136/63   Pulse: 66   Resp: 18   Temp: 97.7 °F (36.5 °C)   SpO2: 99%   Weight: 99.6 kg (219 lb 9.3 oz)   PainSc: 0-No pain     ECOG score: 1         PHQ-9 Total Score: 0         Result Review :   The following data was reviewed by: Beverley Diego MD PhD on 10/11/2021:  Lab Results   Component Value Date    HGB 11.0 (L) 07/09/2021    HCT 33.8 (L) 07/09/2021    MCV 99.4 (H) 07/09/2021     07/09/2021    WBC 5.10 07/09/2021    NEUTROABS 3.55 07/09/2021    LYMPHSABS 1.17 07/09/2021    MONOSABS 0.24 07/09/2021     EOSABS 0.08 07/09/2021    BASOSABS 0.03 07/09/2021     Lab Results   Component Value Date    GLUCOSE 98 07/09/2021    BUN 16 07/09/2021    CREATININE 1.29 (H) 07/09/2021     07/09/2021    K 4.5 07/09/2021     07/09/2021    CO2 23.2 07/09/2021    CALCIUM 9.3 07/09/2021    PROTEINTOT 7.5 07/09/2021    ALBUMIN 4.50 07/09/2021    BILITOT 0.6 07/09/2021    ALKPHOS 115 07/09/2021    AST 22 07/09/2021    ALT 16 07/09/2021          Assessment and Plan    Diagnoses and all orders for this visit:    1. Chronic deep vein thrombosis (DVT) of femoral vein of both lower extremities (HCC) (Primary)    2. Stage 3a chronic kidney disease (HCC)    Recurrent DVT: the repeat doppler US does not specify that the thrombosis noted in Sept is acute or chronic but it appear to be acute based on the finding of a clot in the superficial femoral vein (which is actually a deep vein). Patient is currently on 5 mg of Eliquis but is very concerned due to significant past GI bleeding on this dose.  I believe it is reasonable given his age and reduced kidney function that his dose be decreased to 2.5 mg twice daily to reduce the risk of serious bleeding.  I do not recommend a hypercoagulable work-up since this would not change the management of this patient. I will f/u with the pt in 1 month.  He may have to have right knee replacement and will likely need guidance regarding anticoagulation around the time of surgery.     Patient was given instructions and counseling regarding his condition or for health maintenance advice. Please see specific information pulled into the AVS if appropriate.     Beverley Diego MD PhD    10/11/2021

## 2021-11-01 ENCOUNTER — HOSPITAL ENCOUNTER (OUTPATIENT)
Dept: GENERAL RADIOLOGY | Facility: HOSPITAL | Age: 76
Discharge: HOME | End: 2021-11-01
Admitting: UROLOGY

## 2021-11-01 ENCOUNTER — OFFICE VISIT (OUTPATIENT)
Dept: UROLOGY | Facility: CLINIC | Age: 76
End: 2021-11-01

## 2021-11-01 VITALS
WEIGHT: 221.2 LBS | RESPIRATION RATE: 16 BRPM | SYSTOLIC BLOOD PRESSURE: 149 MMHG | HEIGHT: 74 IN | BODY MASS INDEX: 28.39 KG/M2 | HEART RATE: 60 BPM | DIASTOLIC BLOOD PRESSURE: 69 MMHG

## 2021-11-01 DIAGNOSIS — N20.0 KIDNEY STONE: ICD-10-CM

## 2021-11-01 DIAGNOSIS — N20.0 KIDNEY STONE: Primary | ICD-10-CM

## 2021-11-01 LAB
BILIRUB BLD-MCNC: NEGATIVE MG/DL
CLARITY, POC: CLEAR
COLOR UR: YELLOW
EXPIRATION DATE: ABNORMAL
GLUCOSE UR STRIP-MCNC: NEGATIVE MG/DL
KETONES UR QL: NEGATIVE
LEUKOCYTE EST, POC: NEGATIVE
Lab: ABNORMAL
NITRITE UR-MCNC: NEGATIVE MG/ML
PH UR: 7 [PH] (ref 5–8)
PROT UR STRIP-MCNC: NEGATIVE MG/DL
RBC # UR STRIP: ABNORMAL /UL
SP GR UR: 1.02 (ref 1–1.03)
UROBILINOGEN UR QL: NORMAL

## 2021-11-01 PROCEDURE — 99203 OFFICE O/P NEW LOW 30 MIN: CPT | Performed by: UROLOGY

## 2021-11-01 PROCEDURE — 81003 URINALYSIS AUTO W/O SCOPE: CPT | Performed by: UROLOGY

## 2021-11-01 PROCEDURE — 74018 RADEX ABDOMEN 1 VIEW: CPT

## 2021-11-01 RX ORDER — HYDROCODONE BITARTRATE AND ACETAMINOPHEN 10; 325 MG/1; MG/1
1 TABLET ORAL EVERY 6 HOURS PRN
COMMUNITY
End: 2022-11-15

## 2021-11-01 NOTE — ASSESSMENT & PLAN NOTE
If he is still having pain 1 week from now he will let us know and we will discuss doing a cystoscopy with retrograde pyelograms and stone removal if necessary.  If he has severe pain he will present to [facility] instead of [facility] as there is no urologist there.    It is hard to tell on his CT scan and KUB whether that calcification in the left pelvis is a phlebolith or a distal stone.  At this point his pain is resolved.  He did pass several small stones.

## 2021-11-01 NOTE — PROGRESS NOTES
"Chief Complaint  Flank Pain (have had some kidney stone, straining urine)    Subjective          Jason Arceo presents to [facility]  Patient was seen in the ER with flank pain.  CT scan revealed a possible distal left ureteral stone 7 mm in size.  He did have mild hydronephrosis on the left side.  He also had bilateral nonobstructing small renal calculi.  Since that time he is passed several small stones.  His pain is pretty much resolved although he still has some mild right flank pain.  KUB today revealed a few possible phleboliths in the pelvis but no obvious ureteral stone.  He is afebrile he has no other complaints.      Objective   Vital Signs:   /69   Pulse 60   Resp 16   Ht 188 cm (74\")   Wt 100 kg (221 lb 3.2 oz)   BMI 28.40 kg/m²     Physical Exam  Vitals and nursing note reviewed.   Constitutional:       Appearance: Normal appearance. He is well-developed.   Pulmonary:      Effort: Pulmonary effort is normal.      Breath sounds: Normal air entry.   Neurological:      Mental Status: He is alert and oriented to person, place, and time.      Motor: Motor function is intact.   Psychiatric:         Mood and Affect: Mood normal.         Behavior: Behavior normal.        Result Review :                  Results for orders placed or performed in visit on 11/01/21   POC Urinalysis Dipstick, Automated    Specimen: Urine   Result Value Ref Range    Color Yellow Yellow, Straw, Dark Yellow, Betsey    Clarity, UA Clear Clear    Specific Gravity  1.020 1.005 - 1.030    pH, Urine 7.0 5.0 - 8.0    Leukocytes Negative Negative    Nitrite, UA Negative Negative    Protein, POC Negative Negative mg/dL    Glucose, UA Negative Negative, 1000 mg/dL (3+) mg/dL    Ketones, UA Negative Negative    Urobilinogen, UA Normal Normal    Bilirubin Negative Negative    Blood, UA Trace (A) Negative    Lot Number [id number]     Expiration Date 2,022/9        Assessment and Plan    Diagnoses and all orders " for this visit:    1. Kidney stone (Primary)  Assessment & Plan:  If he is still having pain 1 week from now he will let us know and we will discuss doing a cystoscopy with retrograde pyelograms and stone removal if necessary.  If he has severe pain he will present to [facility] instead of [facility] as there is no urologist there.    It is hard to tell on his CT scan and KUB whether that calcification in the left pelvis is a phlebolith or a distal stone.  At this point his pain is resolved.  He did pass several small stones.    Orders:  -     POC Urinalysis Dipstick, Automated      Follow Up   No follow-ups on file.  Patient was given instructions and counseling regarding his condition or for health maintenance advice. Please see specific information pulled into the AVS if appropriate.

## 2021-11-15 ENCOUNTER — OFFICE VISIT (OUTPATIENT)
Dept: ONCOLOGY | Facility: HOSPITAL | Age: 76
End: 2021-11-15

## 2021-11-15 VITALS
HEART RATE: 58 BPM | DIASTOLIC BLOOD PRESSURE: 61 MMHG | SYSTOLIC BLOOD PRESSURE: 144 MMHG | RESPIRATION RATE: 18 BRPM | WEIGHT: 217.37 LBS | TEMPERATURE: 96.9 F | BODY MASS INDEX: 27.91 KG/M2 | OXYGEN SATURATION: 99 %

## 2021-11-15 DIAGNOSIS — I82.513 CHRONIC DEEP VEIN THROMBOSIS (DVT) OF FEMORAL VEIN OF BOTH LOWER EXTREMITIES (HCC): Primary | ICD-10-CM

## 2021-11-15 DIAGNOSIS — K92.1 HEMATOCHEZIA: ICD-10-CM

## 2021-11-15 DIAGNOSIS — N18.31 STAGE 3A CHRONIC KIDNEY DISEASE (HCC): ICD-10-CM

## 2021-11-15 PROBLEM — M13.879: Status: ACTIVE | Noted: 2021-11-15

## 2021-11-15 PROBLEM — L03.032 CELLULITIS OF TOE OF LEFT FOOT: Status: ACTIVE | Noted: 2021-11-15

## 2021-11-15 PROCEDURE — 99214 OFFICE O/P EST MOD 30 MIN: CPT | Performed by: INTERNAL MEDICINE

## 2021-11-15 RX ORDER — DOXYCYCLINE HYCLATE 100 MG/1
100 CAPSULE ORAL 2 TIMES DAILY
COMMUNITY
Start: 2021-10-15 | End: 2022-11-15

## 2021-11-15 RX ORDER — METHOTREXATE 2.5 MG/1
TABLET ORAL
COMMUNITY
Start: 2021-10-29 | End: 2022-11-15

## 2022-01-05 ENCOUNTER — TELEPHONE (OUTPATIENT)
Dept: ONCOLOGY | Facility: HOSPITAL | Age: 77
End: 2022-01-05

## 2022-01-05 NOTE — TELEPHONE ENCOUNTER
Caller: Jason Arceo    Relationship to patient: Self    Best call back number: [phone]    Chief complaint: PATIENTS SPOUSE CALLED TO RESCHEDULE APPOINTMENT DUE TO ANOTHER APPOINTMENT CONFLICT    Type of visit: FOLLOW UP    Requested date: ANY DAY BUT 1-17-22      If rescheduling, when is the original appointment: 1-17-22     Additional notes:PLEASE CALL TO MAJOREDAJA UNABLE TO RESCHEDULE DUE TO TIMEFRAME

## 2022-02-03 ENCOUNTER — APPOINTMENT (OUTPATIENT)
Dept: ONCOLOGY | Facility: HOSPITAL | Age: 77
End: 2022-02-03

## 2022-02-08 ENCOUNTER — OFFICE VISIT (OUTPATIENT)
Dept: ONCOLOGY | Facility: HOSPITAL | Age: 77
End: 2022-02-08

## 2022-02-08 ENCOUNTER — LAB (OUTPATIENT)
Dept: ONCOLOGY | Facility: HOSPITAL | Age: 77
End: 2022-02-08

## 2022-02-08 VITALS
WEIGHT: 215.39 LBS | HEART RATE: 56 BPM | DIASTOLIC BLOOD PRESSURE: 73 MMHG | OXYGEN SATURATION: 100 % | SYSTOLIC BLOOD PRESSURE: 131 MMHG | RESPIRATION RATE: 18 BRPM | TEMPERATURE: 97.4 F | BODY MASS INDEX: 27.65 KG/M2

## 2022-02-08 DIAGNOSIS — I82.513 CHRONIC DEEP VEIN THROMBOSIS (DVT) OF FEMORAL VEIN OF BOTH LOWER EXTREMITIES: ICD-10-CM

## 2022-02-08 DIAGNOSIS — D50.0 IRON DEFICIENCY ANEMIA DUE TO CHRONIC BLOOD LOSS: ICD-10-CM

## 2022-02-08 DIAGNOSIS — I48.91 ATRIAL FIBRILLATION AND FLUTTER: ICD-10-CM

## 2022-02-08 DIAGNOSIS — I48.92 ATRIAL FIBRILLATION AND FLUTTER: ICD-10-CM

## 2022-02-08 DIAGNOSIS — D50.0 IRON DEFICIENCY ANEMIA DUE TO CHRONIC BLOOD LOSS: Primary | ICD-10-CM

## 2022-02-08 LAB
BASOPHILS # BLD AUTO: 0.03 10*3/MM3 (ref 0–0.2)
BASOPHILS NFR BLD AUTO: 0.6 % (ref 0–1.5)
DEPRECATED RDW RBC AUTO: 61.9 FL (ref 37–54)
EOSINOPHIL # BLD AUTO: 0.04 10*3/MM3 (ref 0–0.4)
EOSINOPHIL NFR BLD AUTO: 0.8 % (ref 0.3–6.2)
ERYTHROCYTE [DISTWIDTH] IN BLOOD BY AUTOMATED COUNT: 16.3 % (ref 12.3–15.4)
FERRITIN SERPL-MCNC: 138.4 NG/ML (ref 30–400)
HCT VFR BLD AUTO: 37.4 % (ref 37.5–51)
HGB BLD-MCNC: 12 G/DL (ref 13–17.7)
IMM GRANULOCYTES # BLD AUTO: 0.02 10*3/MM3 (ref 0–0.05)
IMM GRANULOCYTES NFR BLD AUTO: 0.4 % (ref 0–0.5)
IRON 24H UR-MRATE: 232 MCG/DL (ref 59–158)
IRON SATN MFR SERPL: 64 % (ref 20–50)
LYMPHOCYTES # BLD AUTO: 0.64 10*3/MM3 (ref 0.7–3.1)
LYMPHOCYTES NFR BLD AUTO: 13.1 % (ref 19.6–45.3)
MCH RBC QN AUTO: 33.3 PG (ref 26.6–33)
MCHC RBC AUTO-ENTMCNC: 32.1 G/DL (ref 31.5–35.7)
MCV RBC AUTO: 103.9 FL (ref 79–97)
MONOCYTES # BLD AUTO: 0.48 10*3/MM3 (ref 0.1–0.9)
MONOCYTES NFR BLD AUTO: 9.9 % (ref 5–12)
NEUTROPHILS NFR BLD AUTO: 3.66 10*3/MM3 (ref 1.7–7)
NEUTROPHILS NFR BLD AUTO: 75.2 % (ref 42.7–76)
PLATELET # BLD AUTO: 164 10*3/MM3 (ref 140–450)
PMV BLD AUTO: 10.4 FL (ref 6–12)
RBC # BLD AUTO: 3.6 10*6/MM3 (ref 4.14–5.8)
TIBC SERPL-MCNC: 361 MCG/DL (ref 298–536)
TRANSFERRIN SERPL-MCNC: 242 MG/DL (ref 200–360)
WBC NRBC COR # BLD: 4.87 10*3/MM3 (ref 3.4–10.8)

## 2022-02-08 PROCEDURE — 84466 ASSAY OF TRANSFERRIN: CPT

## 2022-02-08 PROCEDURE — 36415 COLL VENOUS BLD VENIPUNCTURE: CPT

## 2022-02-08 PROCEDURE — 99214 OFFICE O/P EST MOD 30 MIN: CPT | Performed by: INTERNAL MEDICINE

## 2022-02-08 PROCEDURE — G0463 HOSPITAL OUTPT CLINIC VISIT: HCPCS | Performed by: INTERNAL MEDICINE

## 2022-02-08 PROCEDURE — 83540 ASSAY OF IRON: CPT

## 2022-02-08 PROCEDURE — 85025 COMPLETE CBC W/AUTO DIFF WBC: CPT

## 2022-02-08 PROCEDURE — 82728 ASSAY OF FERRITIN: CPT

## 2022-02-08 RX ORDER — PANTOPRAZOLE SODIUM 40 MG/1
TABLET, DELAYED RELEASE ORAL
COMMUNITY
Start: 2021-12-30

## 2022-02-08 RX ORDER — TAMSULOSIN HYDROCHLORIDE 0.4 MG/1
CAPSULE ORAL
COMMUNITY
Start: 2021-12-30

## 2022-02-08 RX ORDER — CODEINE PHOSPHATE AND GUAIFENESIN 10; 100 MG/5ML; MG/5ML
SOLUTION ORAL
COMMUNITY
Start: 2021-12-01 | End: 2022-11-15

## 2022-02-08 RX ORDER — TOPIRAMATE 25 MG/1
TABLET ORAL
COMMUNITY
Start: 2021-12-30

## 2022-02-08 RX ORDER — LOSARTAN POTASSIUM 50 MG/1
TABLET ORAL
COMMUNITY
Start: 2021-12-30 | End: 2022-11-15 | Stop reason: SDUPTHER

## 2022-02-08 RX ORDER — FINASTERIDE 5 MG/1
TABLET, FILM COATED ORAL
COMMUNITY
Start: 2021-12-30

## 2022-02-08 RX ORDER — AMOXICILLIN AND CLAVULANATE POTASSIUM 875; 125 MG/1; MG/1
TABLET, FILM COATED ORAL
COMMUNITY
Start: 2021-11-29 | End: 2022-08-16

## 2022-02-08 RX ORDER — AMOXICILLIN AND CLAVULANATE POTASSIUM 500; 125 MG/1; MG/1
TABLET, FILM COATED ORAL
COMMUNITY
Start: 2022-01-19 | End: 2022-08-16

## 2022-02-08 NOTE — PROGRESS NOTES
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  He developed blood clots in both upper legs and was on anti-coagulation for sometime after and then taken off.    - He has been on blood thinners for various reasons over the years such as atrial fibrillation and developed serious GI bleeding.    - past colonoscopies revealed significant internal hemorrhoids (per pt)   - Bilateral lower extremity venous duplex on 6/13/21: chronic DVT in the Right common femoral, proximal femoral and popliteal as well as left common femoral.   - Pt reports falling/stepping into a small ditch near his house and developing left leg swelling after.    -Left lower extremity Doppler ultrasound on 9/28/2021: Thrombus in the left common femoral, distal superficial femoral, and popliteal veins with some residual flow  -restared on Eliquis 5mg BID on 9/28/21.  -decreased to 2.5mg BID after significant hematochezia with the higher dose.           History of Present Illness  Patient comes in today for follow-up.  He has been off Eliquis since December 18.  He has still had a few episodes of bleeding.  However, his energy is better since the bleeding is reduced.  He is currently on oral iron twice a day.  He also decided to forego the surgery since he does not want to undergo the risk.    Review of Systems   Constitutional: Positive for fatigue. Negative for appetite change, diaphoresis, fever, unexpected weight gain and unexpected weight loss.   HENT: Negative for hearing loss, sore throat and voice change.    Eyes: Negative for blurred vision, double vision, pain, redness and visual disturbance.   Respiratory: Negative for cough, shortness of breath and wheezing.     Cardiovascular: Negative for chest pain, palpitations and leg swelling.   Endocrine: Negative for cold intolerance, heat intolerance, polydipsia and polyuria.   Genitourinary: Negative for decreased urine volume, difficulty urinating, frequency and urinary incontinence.   Musculoskeletal: Positive for joint swelling (Swollen ankles ). Negative for arthralgias, back pain and myalgias.   Skin: Negative for color change, rash, skin lesions and wound.   Neurological: Negative for dizziness, seizures, numbness and headache.   Hematological: Negative for adenopathy. Does not bruise/bleed easily.   Psychiatric/Behavioral: Negative for depressed mood. The patient is not nervous/anxious.    All other systems reviewed and are negative.      Past Medical History:   Diagnosis Date   • Anxiety    • Atherosclerosis of coronary artery 12/26/2020    Alejandra Wallace at [address]   • Atrial fibrillation and flutter ([facility]) 12/26/2020   • BPH (benign prostatic hyperplasia) 12/26/2020   • Confused    • Depression    • Diabetes mellitus ([facility])    • Gastroesophageal reflux disease 12/26/2020   • Heart disease    • Hemorrhage     rectal.    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   • Traumatic brain injury ([facility]) 2010     Past Surgical History:   Procedure Laterality Date   • CARDIAC SURGERY  2006    triple bypass   • CARDIAC SURGERY     • CARDIOVERSION  11/2020   • KNEE SURGERY     • TOTAL HIP ARTHROPLASTY Bilateral    • TOTAL HIP ARTHROPLASTY REVISION Right 12/29/2020    Procedure: right posterior TOTAL HIP ARTHROPLASTY REVISION, pegboard lateral;  Surgeon: Damian Quijano II, MD;  Location: [facility] MAIN OR;  Service: Orthopedics;   Laterality: Right;   • TOTAL KNEE ARTHROPLASTY Right 5/14/2021    Procedure: TOTAL KNEE ARTHROPLASTY;  Surgeon: Damian Quijano II, MD;  Location: [facility] MAIN OR;  Service: Orthopedics;  Laterality: Right;     Social History     Socioeconomic History   • Marital status:    Tobacco Use   • Smoking status: Never Smoker   • Smokeless tobacco: Never Used   Vaping Use   • Vaping Use: Never used   Substance and Sexual Activity   • Alcohol use: No   • Drug use: No   • Sexual activity: Defer     Family History   Problem Relation Age of Onset   • Stroke Mother    • Diabetes Father        Objective   Physical Exam  General: Alert, cooperative, no acute distress, elderly and frail-appearing  Eyes: Anicteric sclera, PERRLA  Respiratory: normal respiratory effort  Cardiovascular: no lower extremity edema  Skin: Normal tone, no rash, no lesions  Psychiatric: Appropriate affect, intact judgment  Neurologic: No focal sensory or motor deficits, normal cognition   Musculoskeletal: Normal muscle strength and tone  Extremities: No clubbing, cyanosis, or deformities    Vitals:    02/08/22 1133   BP: 131/73   Pulse: 56   Resp: 18   Temp: 97.4 °F (36.3 °C)   SpO2: 100%   Weight: 97.7 kg (215 lb 6.2 oz)   PainSc: 0-No pain     ECOG score: 0         PHQ-9 Total Score:         Result Review :   The following data was reviewed by: Beverley Diego MD PhD on 02/08/2022:  Lab Results   Component Value Date    HGB 12.0 (L) 02/08/2022    HCT 37.4 (L) 02/08/2022    .9 (H) 02/08/2022     02/08/2022    WBC 4.87 02/08/2022    NEUTROABS 3.66 02/08/2022    LYMPHSABS 0.64 (L) 02/08/2022    MONOSABS 0.48 02/08/2022    EOSABS 0.04 02/08/2022    BASOSABS 0.03 02/08/2022     Lab Results   Component Value Date    GLUCOSE 98 07/09/2021    BUN 16 07/09/2021    CREATININE 1.29 (H) 07/09/2021     07/09/2021    K 4.5 07/09/2021     07/09/2021    CO2 23.2 07/09/2021    CALCIUM 9.3 07/09/2021    PROTEINTOT 7.5 07/09/2021     ALBUMIN 4.50 07/09/2021    BILITOT 0.6 07/09/2021    ALKPHOS 115 07/09/2021    AST 22 07/09/2021    ALT 16 07/09/2021          Assessment and Plan    Diagnoses and all orders for this visit:    1. Iron deficiency anemia due to chronic blood loss (Primary)  -     CBC & Differential; Future  -     Iron Profile; Future  -     Ferritin; Future  -     CBC & Differential; Future  -     Ferritin; Future  -     Iron Profile; Future    2. Atrial fibrillation and flutter (HCC)    3. Chronic deep vein thrombosis (DVT) of femoral vein of both lower extremities (HCC)      DVT and atrial fibrillation: Been unable to receive 2.5 mg dose of Eliquis due to frequent GI bleeds.  Bleeding has slowed since stopping the medication.  He continues on oral iron supplementation.  He remains very frail with complex medical problems but feels better overall in the past few months.    Iron deficiency anemia: I will repeat CBC and iron studies today.  If there are no significant abnormalities we will follow up with the patient in 3 months.    Patient was given instructions and counseling regarding his condition or for health maintenance advice. Please see specific information pulled into the AVS if appropriate.     Beverley Diego MD PhD    2/21/2022

## 2022-02-09 ENCOUNTER — TELEPHONE (OUTPATIENT)
Dept: ONCOLOGY | Facility: HOSPITAL | Age: 77
End: 2022-02-09

## 2022-02-09 DIAGNOSIS — D64.9 ANEMIA, UNSPECIFIED TYPE: ICD-10-CM

## 2022-02-09 DIAGNOSIS — D50.0 IRON DEFICIENCY ANEMIA DUE TO CHRONIC BLOOD LOSS: Primary | ICD-10-CM

## 2022-02-09 NOTE — TELEPHONE ENCOUNTER
Advised patient to stop taking iron pills, due to iron levels are elevated. We will recheck at follow up.

## 2022-03-11 ENCOUNTER — TELEPHONE (OUTPATIENT)
Dept: ONCOLOGY | Facility: HOSPITAL | Age: 77
End: 2022-03-11

## 2022-03-11 NOTE — TELEPHONE ENCOUNTER
Reviewed lab results Creatinine 1.37 in range with previous labs. Plt count 127 slightly lower. Wife reports patient having increased bleeding from rectum. Will have Dr. Diego review lab results. We will call if she needs to see patient sooner than scheduled.   Bi-Rhombic Flap Text: The defect edges were debeveled with a #15 scalpel blade.  Given the location of the defect and the proximity to free margins a bi-rhombic flap was deemed most appropriate.  Using a sterile surgical marker, an appropriate rhombic flap was drawn incorporating the defect. The area thus outlined was incised deep to adipose tissue with a #15 scalpel blade.  The skin margins were undermined to an appropriate distance in all directions utilizing iris scissors.

## 2022-03-11 NOTE — TELEPHONE ENCOUNTER
Celia called and states that the pt had seen his Rheumatologist and was told that his PLT's and Kidney function was off. Celia does not know the lab values. Celia is going to have the labs faxed to our office. Celia wants to know if he needs to be seen by Dr Diego after we receive the lab fax.

## 2022-03-11 NOTE — TELEPHONE ENCOUNTER
Called patient's wife back. Dr. Diego would like to repeat labs in a few weeks. Patient is scheduled to have labs redrawn for Dr. Chun in 1 month. We will move his appointment up and see him after those labs.

## 2022-03-14 ENCOUNTER — TELEPHONE (OUTPATIENT)
Dept: ONCOLOGY | Facility: HOSPITAL | Age: 77
End: 2022-03-14

## 2022-04-11 ENCOUNTER — TELEPHONE (OUTPATIENT)
Dept: ONCOLOGY | Facility: HOSPITAL | Age: 77
End: 2022-04-11

## 2022-04-11 NOTE — TELEPHONE ENCOUNTER
Detailed msg left for patient to reschedule 04/14 appt with Dr. Diego. Please warm transfer patient to the office if he calls back!

## 2022-04-14 ENCOUNTER — OFFICE VISIT (OUTPATIENT)
Dept: ONCOLOGY | Facility: HOSPITAL | Age: 77
End: 2022-04-14

## 2022-04-14 VITALS
DIASTOLIC BLOOD PRESSURE: 55 MMHG | TEMPERATURE: 97.8 F | SYSTOLIC BLOOD PRESSURE: 127 MMHG | BODY MASS INDEX: 26.3 KG/M2 | OXYGEN SATURATION: 100 % | WEIGHT: 204.81 LBS | HEART RATE: 58 BPM | RESPIRATION RATE: 18 BRPM

## 2022-04-14 DIAGNOSIS — D64.9 ANEMIA, UNSPECIFIED TYPE: ICD-10-CM

## 2022-04-14 DIAGNOSIS — R63.4 WEIGHT LOSS, UNINTENTIONAL: ICD-10-CM

## 2022-04-14 DIAGNOSIS — I82.403 RECURRENT ACUTE DEEP VEIN THROMBOSIS (DVT) OF BOTH LOWER EXTREMITIES: Primary | ICD-10-CM

## 2022-04-14 PROBLEM — L97.521 NON-PRESSURE CHRONIC ULCER OF OTHER PART OF LEFT FOOT LIMITED TO BREAKDOWN OF SKIN (HCC): Status: ACTIVE | Noted: 2022-04-14

## 2022-04-14 PROBLEM — L02.612 ABSCESS OF GREAT TOE OF LEFT FOOT: Status: ACTIVE | Noted: 2022-04-14

## 2022-04-14 PROCEDURE — G0463 HOSPITAL OUTPT CLINIC VISIT: HCPCS | Performed by: INTERNAL MEDICINE

## 2022-04-14 PROCEDURE — 99214 OFFICE O/P EST MOD 30 MIN: CPT | Performed by: INTERNAL MEDICINE

## 2022-04-14 RX ORDER — LINEZOLID 600 MG/1
TABLET, FILM COATED ORAL
COMMUNITY
Start: 2022-02-11 | End: 2022-04-14

## 2022-04-14 RX ORDER — CIPROFLOXACIN 500 MG/1
TABLET, FILM COATED ORAL
COMMUNITY
Start: 2022-02-28 | End: 2022-08-21

## 2022-04-14 RX ORDER — MIRTAZAPINE 15 MG/1
15 TABLET, FILM COATED ORAL NIGHTLY
Qty: 30 TABLET | Refills: 2 | Status: SHIPPED | OUTPATIENT
Start: 2022-04-14 | End: 2022-07-17

## 2022-04-14 RX ORDER — FERROUS SULFATE 220 (44)/5
ELIXIR ORAL
COMMUNITY
Start: 2022-02-04 | End: 2022-11-15

## 2022-04-14 RX ORDER — AMIODARONE HYDROCHLORIDE 200 MG/1
200 TABLET ORAL 2 TIMES DAILY
COMMUNITY
Start: 2022-03-17 | End: 2022-11-15

## 2022-04-14 RX ORDER — TOPIRAMATE 50 MG/1
1 TABLET, FILM COATED ORAL DAILY
COMMUNITY
End: 2022-11-15

## 2022-04-14 NOTE — PROGRESS NOTES
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  He developed blood clots in both upper legs and was on anti-coagulation for sometime after and then taken off.    - He has been on blood thinners for various reasons over the years such as atrial fibrillation and developed serious GI bleeding.    - past colonoscopies revealed significant internal hemorrhoids (per pt)   - Bilateral lower extremity venous duplex on 6/13/21: chronic DVT in the Right common femoral, proximal femoral and popliteal as well as left common femoral.   - Pt reports falling/stepping into a small ditch near his house and developing left leg swelling after.    -Left lower extremity Doppler ultrasound on 9/28/2021: Thrombus in the left common femoral, distal superficial femoral, and popliteal veins with some residual flow  -restared on Eliquis 5mg BID on 9/28/21.  -decreased to 2.5mg BID after significant hematochezia with the higher dose.           HPI  Patient comes in today to follow-up on iron deficiency anemia, history of DVTs, and atrial fibrillation.  He has remained off blood thinners and does not report any significant or recent episodes of bleeding.  He was seen by Dr. Chun, his rheumatologist, who sent him for additional lab testing and follow-up with me.  His wife reports that this follow-up appointment was due to a change in his labs.  I did note that his hemoglobin had dropped from 12 down to 10 at Dr. Chun's office.  After some time we were able to retrieve the more recent labs which were done a few days ago.  Fortunately his hemoglobin is back up to 11.3.  I discussed these results with the patient and his wife.  I am reassured by this improvement.  He still  has a macrocytic anemia but no other significant abnormalities.  Patient generally feels weak and fatigued but overall not significantly different from previous visits.  He does admit to losing a few pounds although this does not upset him.  Patient tells me the reason he is losing weight is because he is not eating well.  His wife says that he hardly eats anything at all.    Review of Systems   Constitutional: Positive for fatigue. Negative for appetite change, diaphoresis, fever, unexpected weight gain and unexpected weight loss.   HENT: Negative for hearing loss, sore throat and voice change.    Eyes: Negative for blurred vision, double vision, pain, redness and visual disturbance.   Respiratory: Negative for cough, shortness of breath and wheezing.    Cardiovascular: Negative for chest pain, palpitations and leg swelling.   Endocrine: Negative for cold intolerance, heat intolerance, polydipsia and polyuria.   Genitourinary: Negative for decreased urine volume, difficulty urinating, frequency and urinary incontinence.   Musculoskeletal: Negative for arthralgias, back pain, joint swelling and myalgias.   Skin: Negative for color change, rash, skin lesions and wound.   Neurological: Negative for dizziness, seizures, numbness and headache.   Hematological: Negative for adenopathy. Does not bruise/bleed easily.   Psychiatric/Behavioral: Negative for depressed mood. The patient is not nervous/anxious.    All other systems reviewed and are negative.      Past Medical History:   Diagnosis Date   • Anxiety    • Atherosclerosis of coronary artery 12/26/2020    Alejandra Wallace at [address]   • Atrial fibrillation and flutter (HCC) 12/26/2020   • BPH (benign prostatic hyperplasia) 12/26/2020   • Confused    • Depression    • Diabetes mellitus (HCC)    • Gastroesophageal reflux disease 12/26/2020   • Heart disease    • Hemorrhage     rectal.    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   • Traumatic brain injury ([facility]) 2010     Past Surgical History:   Procedure Laterality Date   • CARDIAC SURGERY  2006    triple bypass   • CARDIAC SURGERY     • CARDIOVERSION  11/2020   • KNEE SURGERY     • TOTAL HIP ARTHROPLASTY Bilateral    • TOTAL HIP ARTHROPLASTY REVISION Right 12/29/2020    Procedure: right posterior TOTAL HIP ARTHROPLASTY REVISION, pegboard lateral;  Surgeon: Damian Quijano II, MD;  Location: [facility] MAIN OR;  Service: Orthopedics;  Laterality: Right;   • TOTAL KNEE ARTHROPLASTY Right 5/14/2021    Procedure: TOTAL KNEE ARTHROPLASTY;  Surgeon: Damian Quijano II, MD;  Location: [facility] MAIN OR;  Service: Orthopedics;  Laterality: Right;     Social History     Socioeconomic History   • Marital status:    Tobacco Use   • Smoking status: Never Smoker   • Smokeless tobacco: Never Used   Vaping Use   • Vaping Use: Never used   Substance and Sexual Activity   • Alcohol use: No   • Drug use: No   • Sexual activity: Defer     Family History   Problem Relation Age of Onset   • Stroke Mother    • Diabetes Father        Objective   Physical Exam  General: Alert, cooperative, no acute distress, appears cachectic  Eyes: Anicteric sclera, PERRLA  Respiratory: normal respiratory effort  Cardiovascular: 1+ lower extremity pitting edema  Skin: Normal tone, no rash, no lesions  Psychiatric: Appropriate affect, intact judgment  Neurologic: No focal sensory or motor deficits, normal cognition   Musculoskeletal: Reduced muscle strength and tone, ambulatory  Extremities: No clubbing, cyanosis, or deformities    Vitals:    04/14/22 1307   BP: 127/55   Pulse: 58   Resp: 18   Temp: 97.8 °F (36.6 °C)   SpO2: 100%   Weight: 92.9 kg (204  lb 12.9 oz)   PainSc: 0-No pain               PHQ-9 Total Score:         Result Review :   The following data was reviewed by: Beverley Diego MD PhD on 04/14/2022:  Lab Results   Component Value Date    HGB 12.0 (L) 02/08/2022    HCT 37.4 (L) 02/08/2022    .9 (H) 02/08/2022     02/08/2022    WBC 4.87 02/08/2022    NEUTROABS 3.66 02/08/2022    LYMPHSABS 0.64 (L) 02/08/2022    MONOSABS 0.48 02/08/2022    EOSABS 0.04 02/08/2022    BASOSABS 0.03 02/08/2022     Lab Results   Component Value Date    GLUCOSE 98 07/09/2021    BUN 16 07/09/2021    CREATININE 1.29 (H) 07/09/2021     07/09/2021    K 4.5 07/09/2021     07/09/2021    CO2 23.2 07/09/2021    CALCIUM 9.3 07/09/2021    PROTEINTOT 7.5 07/09/2021    ALBUMIN 4.50 07/09/2021    BILITOT 0.6 07/09/2021    ALKPHOS 115 07/09/2021    AST 22 07/09/2021    ALT 16 07/09/2021          Assessment and Plan    Diagnoses and all orders for this visit:    1. 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 Anemia, unspecified type  -     mirtazapine (REMERON) 15 MG tablet; Take 1 tablet by mouth Every Night.  Dispense: 30 tablet; Refill: 2  -     CBC & Differential; Future  -     Comprehensive Metabolic Panel; Future  -     Ferritin; Future  -     Iron Profile; Future  -     Vitamin B12; Future  -     TSH; Future  -     Folate; Future    3. Weight loss, unintentional      Recurrent lower extremity DVT: The patient remains off anticoagulation for this and his history of atrial fibrillation due to frequent bleeding.  Since being off the medication he has not had significant GI bleeding events.  I will continue to monitor.    Anemia: The patient's hemoglobin is 11.3 with an MCV of 105.  I will recheck the patient's CBC, CMP, iron studies, B12/folate, and TSH and follow-up with him in 1 month.    Unintended weight loss: Secondary to poor appetite.  I will send a prescription for mirtazapine which the patient can take  nightly.    Patient was given instructions and counseling regarding his condition or for health maintenance advice. Please see specific information pulled into the AVS if appropriate.

## 2022-04-28 ENCOUNTER — TELEPHONE (OUTPATIENT)
Dept: ONCOLOGY | Facility: HOSPITAL | Age: 77
End: 2022-04-28

## 2022-05-13 ENCOUNTER — APPOINTMENT (OUTPATIENT)
Dept: ONCOLOGY | Facility: HOSPITAL | Age: 77
End: 2022-05-13

## 2022-05-16 ENCOUNTER — OFFICE VISIT (OUTPATIENT)
Dept: ONCOLOGY | Facility: HOSPITAL | Age: 77
End: 2022-05-16

## 2022-05-16 ENCOUNTER — LAB (OUTPATIENT)
Dept: ONCOLOGY | Facility: HOSPITAL | Age: 77
End: 2022-05-16

## 2022-05-16 VITALS
SYSTOLIC BLOOD PRESSURE: 123 MMHG | BODY MASS INDEX: 27.2 KG/M2 | DIASTOLIC BLOOD PRESSURE: 44 MMHG | TEMPERATURE: 97.3 F | RESPIRATION RATE: 18 BRPM | HEART RATE: 62 BPM | WEIGHT: 211.86 LBS | OXYGEN SATURATION: 98 %

## 2022-05-16 DIAGNOSIS — D64.9 ANEMIA, UNSPECIFIED TYPE: ICD-10-CM

## 2022-05-16 DIAGNOSIS — I82.403 RECURRENT ACUTE DEEP VEIN THROMBOSIS (DVT) OF BOTH LOWER EXTREMITIES: ICD-10-CM

## 2022-05-16 DIAGNOSIS — D50.0 IRON DEFICIENCY ANEMIA DUE TO CHRONIC BLOOD LOSS: Primary | ICD-10-CM

## 2022-05-16 PROBLEM — D50.9 IRON DEFICIENCY ANEMIA: Status: ACTIVE | Noted: 2022-05-16

## 2022-05-16 LAB
ALBUMIN SERPL-MCNC: 4.33 G/DL (ref 3.5–5.2)
ALBUMIN/GLOB SERPL: 1.8 G/DL
ALP SERPL-CCNC: 103 U/L (ref 39–117)
ALT SERPL W P-5'-P-CCNC: 25 U/L (ref 1–41)
ANION GAP SERPL CALCULATED.3IONS-SCNC: 10.7 MMOL/L (ref 5–15)
AST SERPL-CCNC: 28 U/L (ref 1–40)
BASOPHILS # BLD AUTO: 0.04 10*3/MM3 (ref 0–0.2)
BASOPHILS NFR BLD AUTO: 0.8 % (ref 0–1.5)
BILIRUB SERPL-MCNC: 0.5 MG/DL (ref 0–1.2)
BUN SERPL-MCNC: 19 MG/DL (ref 8–23)
BUN/CREAT SERPL: 13.4 (ref 7–25)
CALCIUM SPEC-SCNC: 9.5 MG/DL (ref 8.6–10.5)
CHLORIDE SERPL-SCNC: 109 MMOL/L (ref 98–107)
CO2 SERPL-SCNC: 21.3 MMOL/L (ref 22–29)
CREAT SERPL-MCNC: 1.42 MG/DL (ref 0.76–1.27)
DEPRECATED RDW RBC AUTO: 60.4 FL (ref 37–54)
EGFRCR SERPLBLD CKD-EPI 2021: 50.9 ML/MIN/1.73
EOSINOPHIL # BLD AUTO: 0.09 10*3/MM3 (ref 0–0.4)
EOSINOPHIL NFR BLD AUTO: 1.8 % (ref 0.3–6.2)
ERYTHROCYTE [DISTWIDTH] IN BLOOD BY AUTOMATED COUNT: 15.3 % (ref 12.3–15.4)
FERRITIN SERPL-MCNC: 128 NG/ML (ref 30–400)
GLOBULIN UR ELPH-MCNC: 2.5 GM/DL
GLUCOSE SERPL-MCNC: 102 MG/DL (ref 65–99)
HCT VFR BLD AUTO: 33.7 % (ref 37.5–51)
HGB BLD-MCNC: 10.9 G/DL (ref 13–17.7)
IMM GRANULOCYTES # BLD AUTO: 0.03 10*3/MM3 (ref 0–0.05)
IMM GRANULOCYTES NFR BLD AUTO: 0.6 % (ref 0–0.5)
IRON 24H UR-MRATE: 49 MCG/DL (ref 59–158)
IRON SATN MFR SERPL: 14 % (ref 20–50)
LYMPHOCYTES # BLD AUTO: 0.65 10*3/MM3 (ref 0.7–3.1)
LYMPHOCYTES NFR BLD AUTO: 12.7 % (ref 19.6–45.3)
MCH RBC QN AUTO: 34.6 PG (ref 26.6–33)
MCHC RBC AUTO-ENTMCNC: 32.3 G/DL (ref 31.5–35.7)
MCV RBC AUTO: 107 FL (ref 79–97)
MONOCYTES # BLD AUTO: 0.6 10*3/MM3 (ref 0.1–0.9)
MONOCYTES NFR BLD AUTO: 11.7 % (ref 5–12)
NEUTROPHILS NFR BLD AUTO: 3.72 10*3/MM3 (ref 1.7–7)
NEUTROPHILS NFR BLD AUTO: 72.4 % (ref 42.7–76)
PLATELET # BLD AUTO: 167 10*3/MM3 (ref 140–450)
PMV BLD AUTO: 9.6 FL (ref 6–12)
POTASSIUM SERPL-SCNC: 4.3 MMOL/L (ref 3.5–5.2)
PROT SERPL-MCNC: 6.8 G/DL (ref 6–8.5)
RBC # BLD AUTO: 3.15 10*6/MM3 (ref 4.14–5.8)
SODIUM SERPL-SCNC: 141 MMOL/L (ref 136–145)
TIBC SERPL-MCNC: 338 MCG/DL (ref 298–536)
TRANSFERRIN SERPL-MCNC: 227 MG/DL (ref 200–360)
TSH SERPL DL<=0.05 MIU/L-ACNC: 3.16 UIU/ML (ref 0.27–4.2)
WBC NRBC COR # BLD: 5.13 10*3/MM3 (ref 3.4–10.8)

## 2022-05-16 PROCEDURE — 85025 COMPLETE CBC W/AUTO DIFF WBC: CPT

## 2022-05-16 PROCEDURE — 82607 VITAMIN B-12: CPT

## 2022-05-16 PROCEDURE — G0463 HOSPITAL OUTPT CLINIC VISIT: HCPCS | Performed by: INTERNAL MEDICINE

## 2022-05-16 PROCEDURE — 82728 ASSAY OF FERRITIN: CPT

## 2022-05-16 PROCEDURE — 84443 ASSAY THYROID STIM HORMONE: CPT

## 2022-05-16 PROCEDURE — 84466 ASSAY OF TRANSFERRIN: CPT

## 2022-05-16 PROCEDURE — 83540 ASSAY OF IRON: CPT

## 2022-05-16 PROCEDURE — 99214 OFFICE O/P EST MOD 30 MIN: CPT | Performed by: INTERNAL MEDICINE

## 2022-05-16 PROCEDURE — 36415 COLL VENOUS BLD VENIPUNCTURE: CPT

## 2022-05-16 PROCEDURE — 80053 COMPREHEN METABOLIC PANEL: CPT

## 2022-05-16 PROCEDURE — 82746 ASSAY OF FOLIC ACID SERUM: CPT

## 2022-05-16 RX ORDER — AMIODARONE HYDROCHLORIDE 200 MG/1
1 TABLET ORAL 2 TIMES DAILY
COMMUNITY
Start: 2022-05-10 | End: 2022-08-16

## 2022-05-16 RX ORDER — PREDNISONE 10 MG/1
TABLET ORAL
COMMUNITY
Start: 2022-04-29 | End: 2022-11-15

## 2022-05-16 RX ORDER — MIRTAZAPINE 15 MG/1
1 TABLET, FILM COATED ORAL
COMMUNITY
End: 2022-11-15

## 2022-05-16 NOTE — PROGRESS NOTES
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  He developed blood clots in both upper legs and was on anti-coagulation for sometime after and then taken off.    - He has been on blood thinners for various reasons over the years such as atrial fibrillation and developed serious GI bleeding.    - past colonoscopies revealed significant internal hemorrhoids (per pt)   - Bilateral lower extremity venous duplex on 6/13/21: chronic DVT in the Right common femoral, proximal femoral and popliteal as well as left common femoral.   - Pt reports falling/stepping into a small ditch near his house and developing left leg swelling after.    -Left lower extremity Doppler ultrasound on 9/28/2021: Thrombus in the left common femoral, distal superficial femoral, and popliteal veins with some residual flow  -restared on Eliquis 5mg BID on 9/28/21.  -decreased to 2.5mg BID after significant hematochezia with the higher dose.           HPI  Patient comes in today for follow-up regarding his history of anemia.  He also has a history of DVT and is not on anticoagulation due to bleeding.  He says that he still has not had significant bleeding since stopping the medication.  I reviewed his CBC which shows his hemoglobin dropped to 10.9 but this is consistent with several previous values.  His MCV is elevated at 107.  His WBC count and platelet count remain normal.    Review of Systems   Constitutional: Positive for fatigue. Negative for appetite change, diaphoresis, fever, unexpected weight gain and unexpected weight loss.   HENT: Negative for hearing loss, sore throat and voice change.    Eyes: Negative for blurred vision, double vision, pain, redness and visual  disturbance.   Respiratory: Negative for cough, shortness of breath and wheezing.    Cardiovascular: Negative for chest pain, palpitations and leg swelling.   Endocrine: Negative for cold intolerance, heat intolerance, polydipsia and polyuria.   Genitourinary: Negative for decreased urine volume, difficulty urinating, frequency and urinary incontinence.   Musculoskeletal: Negative for arthralgias, back pain, joint swelling and myalgias.   Skin: Negative for color change, rash, skin lesions and wound.   Neurological: Negative for dizziness, seizures, numbness and headache.   Hematological: Negative for adenopathy. Does not bruise/bleed easily.   Psychiatric/Behavioral: Negative for depressed mood. The patient is not nervous/anxious.    All other systems reviewed and are negative.      Past Medical History:   Diagnosis Date   • Anxiety    • Atherosclerosis of coronary artery 12/26/2020    Alejandra Wallace at [address]   • Atrial fibrillation and flutter ([facility]) 12/26/2020   • BPH (benign prostatic hyperplasia) 12/26/2020   • Confused    • Depression    • Diabetes mellitus ([facility])    • Gastroesophageal reflux disease 12/26/2020   • Heart disease    • Hemorrhage     rectal.    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   • Traumatic brain injury ([facility]) 2010     Past Surgical History:   Procedure Laterality Date   • CARDIAC SURGERY  2006    triple bypass   • CARDIAC SURGERY     • CARDIOVERSION  11/2020   • KNEE SURGERY     • TOTAL HIP ARTHROPLASTY Bilateral    • TOTAL HIP ARTHROPLASTY REVISION Right 12/29/2020    Procedure: right posterior TOTAL HIP ARTHROPLASTY REVISION, pegboard lateral;  Surgeon: Damian Quijano II, MD;   Location: [facility] MAIN OR;  Service: Orthopedics;  Laterality: Right;   • TOTAL KNEE ARTHROPLASTY Right 5/14/2021    Procedure: TOTAL KNEE ARTHROPLASTY;  Surgeon: Damian Quijano II, MD;  Location: [facility] MAIN OR;  Service: Orthopedics;  Laterality: Right;     Social History     Socioeconomic History   • Marital status:    Tobacco Use   • Smoking status: Never Smoker   • Smokeless tobacco: Never Used   Vaping Use   • Vaping Use: Never used   Substance and Sexual Activity   • Alcohol use: No   • Drug use: No   • Sexual activity: Defer     Family History   Problem Relation Age of Onset   • Stroke Mother    • Diabetes Father        Objective   Physical Exam  General: Alert, cooperative, no acute distress  Eyes: Anicteric sclera, PERRLA  Respiratory: normal respiratory effort  Cardiovascular: no lower extremity edema  Skin: Normal tone, no rash, no lesions  Psychiatric: Appropriate affect, intact judgment  Neurologic: No focal sensory or motor deficits, normal cognition   Musculoskeletal: reduced muscle strength and tone but ambulatory   Extremities: No clubbing, cyanosis, or deformities    Vitals:    05/16/22 1331   BP: 123/44   Pulse: 62   Resp: 18   Temp: 97.3 °F (36.3 °C)   SpO2: 98%   Weight: 96.1 kg (211 lb 13.8 oz)   PainSc: 0-No pain     ECOG score: 0         PHQ-9 Total Score:         Result Review :   The following data was reviewed by: Beverley Diego MD PhD on 05/16/2022:  Lab Results   Component Value Date    HGB 10.9 (L) 05/16/2022    HCT 33.7 (L) 05/16/2022    .0 (H) 05/16/2022     05/16/2022    WBC 5.13 05/16/2022    NEUTROABS 3.72 05/16/2022    LYMPHSABS 0.65 (L) 05/16/2022    MONOSABS 0.60 05/16/2022    EOSABS 0.09 05/16/2022    BASOSABS 0.04 05/16/2022     Lab Results   Component Value Date    GLUCOSE 102 (H) 05/16/2022    BUN 19 05/16/2022    CREATININE 1.42 (H) 05/16/2022     05/16/2022    K 4.3 05/16/2022     (H) 05/16/2022    CO2 21.3 (L) 05/16/2022     CALCIUM 9.5 05/16/2022    PROTEINTOT 6.8 05/16/2022    ALBUMIN 4.33 05/16/2022    BILITOT 0.5 05/16/2022    ALKPHOS 103 05/16/2022    AST 28 05/16/2022    ALT 25 05/16/2022          Assessment and Plan    Diagnoses and all orders for this visit:    1. Iron deficiency anemia due to chronic blood loss (Primary)  -     CBC & Differential; Future  -     Ferritin; Future  -     Iron Profile; Future    2. Recurrent acute deep vein thrombosis (DVT) of both lower extremities (HCC)      Iron Deficiency Anemia:  The pts hgb has dropped to 10.9 with an increased MCV.  His iron studies show a low iron saturation with normal ferritin. I will discuss iron supplementation with him. He is on folic acid and his B12 level is pending.      DVT and atrial fibrillation:  The pt was unable to tolerate anti-coagulation due to GI bleeds.  His is not longer on a blood thinner    Patient was given instructions and counseling regarding his condition or for health maintenance advice. Please see specific information pulled into the AVS if appropriate.

## 2022-05-17 LAB
FOLATE SERPL-MCNC: >20 NG/ML (ref 4.78–24.2)
VIT B12 BLD-MCNC: 282 PG/ML (ref 211–946)

## 2022-07-13 DIAGNOSIS — D64.9 ANEMIA, UNSPECIFIED TYPE: ICD-10-CM

## 2022-07-17 RX ORDER — MIRTAZAPINE 15 MG/1
TABLET, FILM COATED ORAL
Qty: 90 TABLET | Refills: 3 | Status: SHIPPED | OUTPATIENT
Start: 2022-07-17 | End: 2022-11-15

## 2022-08-16 ENCOUNTER — LAB (OUTPATIENT)
Dept: ONCOLOGY | Facility: HOSPITAL | Age: 77
End: 2022-08-16

## 2022-08-16 ENCOUNTER — OFFICE VISIT (OUTPATIENT)
Dept: ONCOLOGY | Facility: HOSPITAL | Age: 77
End: 2022-08-16

## 2022-08-16 VITALS
OXYGEN SATURATION: 99 % | TEMPERATURE: 97.5 F | WEIGHT: 210.98 LBS | HEART RATE: 59 BPM | RESPIRATION RATE: 18 BRPM | DIASTOLIC BLOOD PRESSURE: 67 MMHG | BODY MASS INDEX: 27.09 KG/M2 | SYSTOLIC BLOOD PRESSURE: 152 MMHG

## 2022-08-16 DIAGNOSIS — I25.10 ATHEROSCLEROSIS OF NATIVE CORONARY ARTERY OF NATIVE HEART WITHOUT ANGINA PECTORIS: ICD-10-CM

## 2022-08-16 DIAGNOSIS — D64.9 ANEMIA, UNSPECIFIED TYPE: ICD-10-CM

## 2022-08-16 DIAGNOSIS — D50.0 IRON DEFICIENCY ANEMIA DUE TO CHRONIC BLOOD LOSS: Primary | ICD-10-CM

## 2022-08-16 DIAGNOSIS — D50.0 IRON DEFICIENCY ANEMIA DUE TO CHRONIC BLOOD LOSS: ICD-10-CM

## 2022-08-16 PROBLEM — I50.32 CHRONIC DIASTOLIC HEART FAILURE: Status: ACTIVE | Noted: 2022-05-25

## 2022-08-16 PROBLEM — M06.9 RHEUMATOID ARTHRITIS: Status: ACTIVE | Noted: 2022-08-16

## 2022-08-16 PROBLEM — I74.5: Status: ACTIVE | Noted: 2022-08-16

## 2022-08-16 LAB
BASOPHILS # BLD AUTO: 0.02 10*3/MM3 (ref 0–0.2)
BASOPHILS NFR BLD AUTO: 0.3 % (ref 0–1.5)
DEPRECATED RDW RBC AUTO: 60.9 FL (ref 37–54)
EOSINOPHIL # BLD AUTO: 0.06 10*3/MM3 (ref 0–0.4)
EOSINOPHIL NFR BLD AUTO: 0.9 % (ref 0.3–6.2)
ERYTHROCYTE [DISTWIDTH] IN BLOOD BY AUTOMATED COUNT: 16.1 % (ref 12.3–15.4)
FERRITIN SERPL-MCNC: 143.1 NG/ML (ref 30–400)
HCT VFR BLD AUTO: 34.6 % (ref 37.5–51)
HGB BLD-MCNC: 11.3 G/DL (ref 13–17.7)
IMM GRANULOCYTES # BLD AUTO: 0.02 10*3/MM3 (ref 0–0.05)
IMM GRANULOCYTES NFR BLD AUTO: 0.3 % (ref 0–0.5)
IRON 24H UR-MRATE: 177 MCG/DL (ref 59–158)
IRON SATN MFR SERPL: 52 % (ref 20–50)
LYMPHOCYTES # BLD AUTO: 0.79 10*3/MM3 (ref 0.7–3.1)
LYMPHOCYTES NFR BLD AUTO: 12.3 % (ref 19.6–45.3)
MCH RBC QN AUTO: 33.9 PG (ref 26.6–33)
MCHC RBC AUTO-ENTMCNC: 32.7 G/DL (ref 31.5–35.7)
MCV RBC AUTO: 103.9 FL (ref 79–97)
MONOCYTES # BLD AUTO: 0.48 10*3/MM3 (ref 0.1–0.9)
MONOCYTES NFR BLD AUTO: 7.5 % (ref 5–12)
NEUTROPHILS NFR BLD AUTO: 5.03 10*3/MM3 (ref 1.7–7)
NEUTROPHILS NFR BLD AUTO: 78.7 % (ref 42.7–76)
PLATELET # BLD AUTO: 166 10*3/MM3 (ref 140–450)
PMV BLD AUTO: 9.4 FL (ref 6–12)
RBC # BLD AUTO: 3.33 10*6/MM3 (ref 4.14–5.8)
TIBC SERPL-MCNC: 343 MCG/DL (ref 298–536)
TRANSFERRIN SERPL-MCNC: 230 MG/DL (ref 200–360)
WBC NRBC COR # BLD: 6.4 10*3/MM3 (ref 3.4–10.8)

## 2022-08-16 PROCEDURE — 85025 COMPLETE CBC W/AUTO DIFF WBC: CPT

## 2022-08-16 PROCEDURE — 84466 ASSAY OF TRANSFERRIN: CPT

## 2022-08-16 PROCEDURE — 83540 ASSAY OF IRON: CPT

## 2022-08-16 PROCEDURE — 82728 ASSAY OF FERRITIN: CPT

## 2022-08-16 PROCEDURE — 36415 COLL VENOUS BLD VENIPUNCTURE: CPT

## 2022-08-16 PROCEDURE — 99214 OFFICE O/P EST MOD 30 MIN: CPT | Performed by: INTERNAL MEDICINE

## 2022-08-16 PROCEDURE — G0463 HOSPITAL OUTPT CLINIC VISIT: HCPCS | Performed by: INTERNAL MEDICINE

## 2022-08-16 RX ORDER — BUMETANIDE 1 MG/1
TABLET ORAL
COMMUNITY
Start: 2022-08-15

## 2022-08-16 RX ORDER — NYSTATIN 100000 [USP'U]/G
1 POWDER TOPICAL 2 TIMES DAILY
COMMUNITY
Start: 2022-06-22 | End: 2022-11-15

## 2022-08-16 RX ORDER — BENZONATATE 200 MG/1
CAPSULE ORAL
COMMUNITY
Start: 2022-07-12 | End: 2022-11-15

## 2022-08-16 RX ORDER — EZETIMIBE 10 MG/1
10 TABLET ORAL DAILY
COMMUNITY
Start: 2022-06-10 | End: 2022-11-15

## 2022-08-16 RX ORDER — CARVEDILOL 3.12 MG/1
TABLET ORAL
COMMUNITY
Start: 2022-05-25 | End: 2022-11-15

## 2022-08-16 NOTE — PROGRESS NOTES
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  He developed blood clots in both upper legs and was on anti-coagulation for sometime after and then taken off.    - He has been on blood thinners for various reasons over the years such as atrial fibrillation and developed serious GI bleeding.    - past colonoscopies revealed significant internal hemorrhoids (per pt)   - Bilateral lower extremity venous duplex on 6/13/21: chronic DVT in the Right common femoral, proximal femoral and popliteal as well as left common femoral.   - Pt reports falling/stepping into a small ditch near his house and developing left leg swelling after.    -Left lower extremity Doppler ultrasound on 9/28/2021: Thrombus in the left common femoral, distal superficial femoral, and popliteal veins with some residual flow  -restared on Eliquis 5mg BID on 9/28/21.  -decreased to 2.5mg BID after significant hematochezia with the higher dose.           HPI  Patient comes in today to follow-up regarding his history of anemia and DVT.  He has no new complaints or concerns.  He has not had any new leg swelling and has not had any recent GI bleed.    Review of Systems   Constitutional: Positive for fatigue. Negative for appetite change, diaphoresis, fever, unexpected weight gain and unexpected weight loss.   HENT: Negative for hearing loss, sore throat and voice change.    Eyes: Negative for blurred vision, double vision, pain, redness and visual disturbance.   Respiratory: Negative for cough, shortness of breath and wheezing.    Cardiovascular: Negative for chest pain, palpitations and leg swelling.   Endocrine: Negative for cold intolerance, heat intolerance, polydipsia and  polyuria.   Genitourinary: Negative for decreased urine volume, difficulty urinating, frequency and urinary incontinence.   Musculoskeletal: Negative for arthralgias, back pain, joint swelling and myalgias.   Skin: Negative for color change, rash, skin lesions and wound.   Neurological: Negative for dizziness, seizures, numbness and headache.   Hematological: Negative for adenopathy. Does not bruise/bleed easily.   Psychiatric/Behavioral: Negative for depressed mood. The patient is not nervous/anxious.        Past Medical History:   Diagnosis Date   • Anxiety    • Atherosclerosis of coronary artery 12/26/2020    Alejandra Wallace at [address]   • Atrial fibrillation and flutter ([facility]) 12/26/2020   • BPH (benign prostatic hyperplasia) 12/26/2020   • Confused    • Depression    • Diabetes mellitus ([facility])    • Gastroesophageal reflux disease 12/26/2020   • Heart disease    • Hemorrhage     rectal.    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   • Traumatic brain injury ([facility]) 2010     Past Surgical History:   Procedure Laterality Date   • CARDIAC SURGERY  2006    triple bypass   • CARDIAC SURGERY     • CARDIOVERSION  11/2020   • KNEE SURGERY     • TOTAL HIP ARTHROPLASTY Bilateral    • TOTAL HIP ARTHROPLASTY REVISION Right 12/29/2020    Procedure: right posterior TOTAL HIP ARTHROPLASTY REVISION, pegboard lateral;  Surgeon: Damian Quijano II, MD;  Location: [facility] MAIN OR;  Service: Orthopedics;  Laterality: Right;   • TOTAL KNEE ARTHROPLASTY Right 5/14/2021    Procedure: TOTAL KNEE ARTHROPLASTY;  Surgeon: Damian Quijano II, MD;  Location: [facility] MAIN OR;  Service: Orthopedics;  Laterality: Right;     Social  History     Socioeconomic History   • Marital status:    Tobacco Use   • Smoking status: Never Smoker   • Smokeless tobacco: Never Used   Vaping Use   • Vaping Use: Never used   Substance and Sexual Activity   • Alcohol use: No   • Drug use: No   • Sexual activity: Defer     Family History   Problem Relation Age of Onset   • Stroke Mother    • Diabetes Father        Objective   Physical Exam  General: Alert, cooperative, no acute distress, elderly appearing  Eyes: Anicteric sclera, PERRLA  Respiratory: normal respiratory effort  Cardiovascular: 1+ lower extremity edema  Skin: Normal tone, no rash, no lesions  Psychiatric: Appropriate affect, intact judgment  Neurologic: No focal sensory or motor deficits, normal cognition   Musculoskeletal: Reduced muscle strength and tone  Extremities: No clubbing, cyanosis, or deformities    Vitals:    08/16/22 1427   BP: 152/67   Pulse: 59   Resp: 18   Temp: 97.5 °F (36.4 °C)   SpO2: 99%   Weight: 95.7 kg (210 lb 15.7 oz)   PainSc: 0-No pain     ECOG score: 0         PHQ-9 Total Score:         Result Review :   The following data was reviewed by: Beverley Diego MD PhD on 08/16/2022:  Lab Results   Component Value Date    HGB 11.3 (L) 08/16/2022    HCT 34.6 (L) 08/16/2022    .9 (H) 08/16/2022     08/16/2022    WBC 6.40 08/16/2022    NEUTROABS 5.03 08/16/2022    LYMPHSABS 0.79 08/16/2022    MONOSABS 0.48 08/16/2022    EOSABS 0.06 08/16/2022    BASOSABS 0.02 08/16/2022     Lab Results   Component Value Date    GLUCOSE 102 (H) 05/16/2022    BUN 19 05/16/2022    CREATININE 1.42 (H) 05/16/2022     05/16/2022    K 4.3 05/16/2022     (H) 05/16/2022    CO2 21.3 (L) 05/16/2022    CALCIUM 9.5 05/16/2022    PROTEINTOT 6.8 05/16/2022    ALBUMIN 4.33 05/16/2022    BILITOT 0.5 05/16/2022    ALKPHOS 103 05/16/2022    AST 28 05/16/2022    ALT 25 05/16/2022          Assessment and Plan    Diagnoses and all orders for this visit:    1. Iron deficiency anemia due  to chronic blood loss (Primary)  -     CBC & Differential; Future  -     Iron Profile; Future  -     Ferritin; Future  -     Folate; Future  -     Vitamin B12; Future  -     Methylmalonic Acid, Serum; Future  -     Homocysteine, plasma; Future    2. Anemia, unspecified type    3. Atherosclerosis of native coronary artery of native heart without angina pectoris   -     Homocysteine, plasma; Future      Anemia: Today's labs show the patient's hemoglobin has improved significantly to 11.3 since he no longer has rectal bleeding.  Iron studies show his iron saturation is 52%.  He is off oral iron supplementation.  His MCV is slightly elevated at 104 so I will check B12/folate, and homocystine and MMA.  Patient will follow up with me in 3 months.    History of DVT: Patient is no longer taking anticoagulation due to significant GI bleeding while on this medication.    Patient was given instructions and counseling regarding his condition or for health maintenance advice. Please see specific information pulled into the AVS if appropriate.

## 2022-11-15 ENCOUNTER — LAB (OUTPATIENT)
Dept: ONCOLOGY | Facility: HOSPITAL | Age: 77
End: 2022-11-15

## 2022-11-15 ENCOUNTER — OFFICE VISIT (OUTPATIENT)
Dept: ONCOLOGY | Facility: HOSPITAL | Age: 77
End: 2022-11-15

## 2022-11-15 VITALS
HEART RATE: 60 BPM | OXYGEN SATURATION: 100 % | DIASTOLIC BLOOD PRESSURE: 56 MMHG | RESPIRATION RATE: 18 BRPM | SYSTOLIC BLOOD PRESSURE: 125 MMHG | WEIGHT: 208.11 LBS | TEMPERATURE: 97.3 F | BODY MASS INDEX: 26.72 KG/M2

## 2022-11-15 DIAGNOSIS — I25.10 ATHEROSCLEROSIS OF NATIVE CORONARY ARTERY OF NATIVE HEART WITHOUT ANGINA PECTORIS: ICD-10-CM

## 2022-11-15 DIAGNOSIS — D50.9 IRON DEFICIENCY ANEMIA, UNSPECIFIED IRON DEFICIENCY ANEMIA TYPE: Primary | ICD-10-CM

## 2022-11-15 DIAGNOSIS — D50.0 IRON DEFICIENCY ANEMIA DUE TO CHRONIC BLOOD LOSS: ICD-10-CM

## 2022-11-15 PROBLEM — S06.9XAA TRAUMATIC BRAIN INJURY, CLOSED (HCC): Status: ACTIVE | Noted: 2022-11-15

## 2022-11-15 PROBLEM — Z86.79 H/O: HTN (HYPERTENSION): Status: ACTIVE | Noted: 2022-11-15

## 2022-11-15 PROBLEM — N40.0 ENLARGED PROSTATE: Status: ACTIVE | Noted: 2022-11-15

## 2022-11-15 LAB
BASOPHILS # BLD AUTO: 0.03 10*3/MM3 (ref 0–0.2)
BASOPHILS NFR BLD AUTO: 0.5 % (ref 0–1.5)
DEPRECATED RDW RBC AUTO: 52.4 FL (ref 37–54)
EOSINOPHIL # BLD AUTO: 0.08 10*3/MM3 (ref 0–0.4)
EOSINOPHIL NFR BLD AUTO: 1.4 % (ref 0.3–6.2)
ERYTHROCYTE [DISTWIDTH] IN BLOOD BY AUTOMATED COUNT: 14.2 % (ref 12.3–15.4)
FERRITIN SERPL-MCNC: 128.9 NG/ML (ref 30–400)
FOLATE SERPL-MCNC: >20 NG/ML (ref 4.78–24.2)
HCT VFR BLD AUTO: 38.8 % (ref 37.5–51)
HCYS SERPL-MCNC: 26.4 UMOL/L (ref 0–15)
HGB BLD-MCNC: 12.9 G/DL (ref 13–17.7)
IMM GRANULOCYTES # BLD AUTO: 0.04 10*3/MM3 (ref 0–0.05)
IMM GRANULOCYTES NFR BLD AUTO: 0.7 % (ref 0–0.5)
IRON 24H UR-MRATE: 197 MCG/DL (ref 59–158)
IRON SATN MFR SERPL: 51 % (ref 20–50)
LYMPHOCYTES # BLD AUTO: 0.97 10*3/MM3 (ref 0.7–3.1)
LYMPHOCYTES NFR BLD AUTO: 16.6 % (ref 19.6–45.3)
MCH RBC QN AUTO: 33.8 PG (ref 26.6–33)
MCHC RBC AUTO-ENTMCNC: 33.2 G/DL (ref 31.5–35.7)
MCV RBC AUTO: 101.6 FL (ref 79–97)
MONOCYTES # BLD AUTO: 0.46 10*3/MM3 (ref 0.1–0.9)
MONOCYTES NFR BLD AUTO: 7.9 % (ref 5–12)
NEUTROPHILS NFR BLD AUTO: 4.27 10*3/MM3 (ref 1.7–7)
NEUTROPHILS NFR BLD AUTO: 72.9 % (ref 42.7–76)
PLATELET # BLD AUTO: 211 10*3/MM3 (ref 140–450)
PMV BLD AUTO: 9.8 FL (ref 6–12)
RBC # BLD AUTO: 3.82 10*6/MM3 (ref 4.14–5.8)
TIBC SERPL-MCNC: 387 MCG/DL (ref 298–536)
TRANSFERRIN SERPL-MCNC: 260 MG/DL (ref 200–360)
VIT B12 BLD-MCNC: 362 PG/ML (ref 211–946)
WBC NRBC COR # BLD: 5.85 10*3/MM3 (ref 3.4–10.8)

## 2022-11-15 PROCEDURE — 82607 VITAMIN B-12: CPT

## 2022-11-15 PROCEDURE — 85025 COMPLETE CBC W/AUTO DIFF WBC: CPT

## 2022-11-15 PROCEDURE — 99213 OFFICE O/P EST LOW 20 MIN: CPT | Performed by: NURSE PRACTITIONER

## 2022-11-15 PROCEDURE — 36415 COLL VENOUS BLD VENIPUNCTURE: CPT

## 2022-11-15 PROCEDURE — 82728 ASSAY OF FERRITIN: CPT

## 2022-11-15 PROCEDURE — 83540 ASSAY OF IRON: CPT

## 2022-11-15 PROCEDURE — G0463 HOSPITAL OUTPT CLINIC VISIT: HCPCS | Performed by: NURSE PRACTITIONER

## 2022-11-15 PROCEDURE — 83921 ORGANIC ACID SINGLE QUANT: CPT

## 2022-11-15 PROCEDURE — 83090 ASSAY OF HOMOCYSTEINE: CPT

## 2022-11-15 PROCEDURE — 82746 ASSAY OF FOLIC ACID SERUM: CPT

## 2022-11-15 PROCEDURE — 84466 ASSAY OF TRANSFERRIN: CPT

## 2022-11-15 RX ORDER — HYDROXYCHLOROQUINE SULFATE 200 MG/1
TABLET, FILM COATED ORAL
COMMUNITY
Start: 2022-11-03 | End: 2023-02-15

## 2022-11-15 RX ORDER — AMIODARONE HYDROCHLORIDE 100 MG/1
TABLET ORAL
COMMUNITY
Start: 2022-11-07

## 2022-11-15 RX ORDER — POTASSIUM CHLORIDE 750 MG/1
CAPSULE, EXTENDED RELEASE ORAL
COMMUNITY
Start: 2022-07-21

## 2022-11-15 RX ORDER — LOSARTAN POTASSIUM 50 MG/1
50 TABLET ORAL
COMMUNITY
Start: 2022-06-13

## 2022-11-15 NOTE — PROGRESS NOTES
Chief Complaint  Iron deficiency anemia due to chronic blood loss    Ravi Guevara MD Honaker, Bryan M, MD Subjective          Jason Arceo presents to [facility] for anemia.     History of Present Illness   Mr. Jason Arceo presents with his wife for follow up anemia. His wife reports he was hospitalized about last week for GI blood. Reports she changed his Eliquis dosing to 2.5 mg. Reports was told he had bilateral DVTs present on US.  He has IVC filter in place. He has ongoing fatigue. Labs were done today and his hemoglobin is quite stable. Hemoglobin is 11.9 on today's labs. He was seen 3 months ago in August with Dr. Diego. Other labs are pending and not resulted yet.      He has follow up with cardiologist at the end of the month. Reports his amodiarone was decreased to 100 mg from 200 mg. Wife reports his HR was in the 30's in the hospital. Vitals are stable today. HR of 60. BP is quite stable.       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  He developed blood clots in both upper legs and was on anti-coagulation for sometime after and then taken off.    - He has been on blood thinners for various reasons over the years such as atrial fibrillation and developed serious GI bleeding.    - past colonoscopies revealed significant internal hemorrhoids (per pt)   - Bilateral lower extremity venous duplex on 6/13/21: chronic DVT in the Right common femoral, proximal femoral and popliteal as well as left common femoral.   - Pt reports falling/stepping into a small ditch near his house and developing left leg swelling after.    -Left lower extremity Doppler ultrasound on 9/28/2021: Thrombus in the left common femoral, distal superficial femoral, and popliteal veins with some residual flow  -restared on Eliquis 5mg BID on 9/28/21.  -decreased to 2.5mg BID after significant hematochezia with the  higher dose.           Review of Systems   Constitutional: Positive for fatigue (6/10). Negative for appetite change, diaphoresis, fever, unexpected weight gain and unexpected weight loss.   HENT: Negative for hearing loss, mouth sores, sore throat, swollen glands, trouble swallowing and voice change.    Eyes: Negative for blurred vision.   Respiratory: Negative for cough, shortness of breath and wheezing.    Cardiovascular: Negative for chest pain and palpitations.   Gastrointestinal: Negative for abdominal pain, blood in stool, constipation, diarrhea, nausea and vomiting.   Endocrine: Negative for cold intolerance and heat intolerance.   Genitourinary: Negative for difficulty urinating, dysuria, frequency, hematuria and urinary incontinence.   Musculoskeletal: Negative for arthralgias, back pain and myalgias.   Skin: Negative for rash, skin lesions and wound.   Neurological: Negative for dizziness, seizures, weakness, numbness and headache.   Hematological: Does not bruise/bleed easily.   Psychiatric/Behavioral: Negative for depressed mood. The patient is not nervous/anxious.    All other systems reviewed and are negative.      Current Outpatient Medications on File Prior to Visit   Medication Sig Dispense Refill   • amiodarone (PACERONE) 100 MG tablet      • apixaban (ELIQUIS) 2.5 MG tablet tablet      • bumetanide (BUMEX) 1 MG tablet      • finasteride (PROSCAR) 5 MG tablet      • folic acid (FOLVITE) 1 MG tablet Take 1,000 mcg by mouth Daily.  11   • hydroxychloroquine (PLAQUENIL) 200 MG tablet   0 Refill(s)     • losartan (COZAAR) 50 MG tablet Take 50 mg by mouth.     • Multiple Vitamins-Minerals (ICAPS AREDS 2) capsule Take  by mouth. Stop 5-7 for surgery     • multivitamin (THERAGRAN) tablet tablet      • NON FORMULARY Take 2 tablets by mouth Daily. Focus factor     • pantoprazole (PROTONIX) 40 MG EC tablet      • potassium chloride (K-DUR,KLOR-CON) 10 MEQ CR tablet Take 10 mEq by mouth Every Morning. Take  dos     • potassium chloride (MICRO-K) 10 MEQ CR capsule   0 Refill(s)     • sucralfate (CARAFATE) 1 g tablet Take 1 tablet by mouth 3 (Three) Times a Day. 90 tablet 0   • tamsulosin (FLOMAX) 0.4 MG capsule 24 hr capsule      • topiramate (TOPAMAX) 25 MG tablet      • [DISCONTINUED] amiodarone (PACERONE) 200 MG tablet Take 200 mg by mouth 2 (Two) Times a Day.     • [DISCONTINUED] amiodarone (PACERONE) 400 MG tablet      • [DISCONTINUED] benzonatate (TESSALON) 200 MG capsule      • [DISCONTINUED] carvedilol (COREG) 3.125 MG tablet Take 1 tablet (3.125 mg total) by mouth 2 (two) times a day with meals.     • [DISCONTINUED] cefdinir (OMNICEF) 300 MG capsule      • [DISCONTINUED] doxycycline (VIBRAMYCIN) 100 MG capsule Take 100 mg by mouth 2 (Two) Times a Day.     • [DISCONTINUED] ezetimibe (ZETIA) 10 MG tablet Take 10 mg by mouth Daily.     • [DISCONTINUED] FeroSul 325 (65 Fe) MG tablet Take 1 tablet by mouth 2 (Two) Times a Day.     • [DISCONTINUED] ferrous sulfate 220 (44 Fe) MG/5ML solution TAKE 1 TABLET BY MOUTH TWICE DAILY     • [DISCONTINUED] ferrous sulfate 324 (65 Fe) MG tablet delayed-release EC tablet Take 324 mg by mouth 2 (Two) Times a Day With Meals.     • [DISCONTINUED] guaiFENesin-codeine (ROMILAR-AC) 100-10 MG/5ML syrup TAKE 1 TEASPOON BY MOUTH EVERY 4 HOURS AS NEEDED FOR COUGH     • [DISCONTINUED] HYDROcodone-acetaminophen (NORCO)  MG per tablet Take 1 tablet by mouth Every 6 (Six) Hours As Needed for Moderate Pain .     • [DISCONTINUED] losartan (COZAAR) 50 MG tablet      • [DISCONTINUED] metFORMIN (GLUCOPHAGE) 500 MG tablet      • [DISCONTINUED] metFORMIN (GLUCOPHAGE) 500 MG tablet Take 1 tablet by mouth Daily.     • [DISCONTINUED] Methotrexate 10 MG/0.4ML solution prefilled syringe      • [DISCONTINUED] methotrexate 2.5 MG tablet      • [DISCONTINUED] mirtazapine (REMERON) 15 MG tablet Take 1 tablet by mouth.     • [DISCONTINUED] mirtazapine (REMERON) 15 MG tablet TAKE 1 TABLET EVERY NIGHT 90  "tablet 3   • [DISCONTINUED] Multiple Vitamin (MULTI VITAMIN DAILY PO) Take 1 tablet by mouth Daily. Stop 5-7     • [DISCONTINUED] nystatin 050647 UNIT/GM topical powder Apply 1 application topically to the appropriate area as directed 2 (Two) Times a Day. APPLY TO AFFECTED AREA     • [DISCONTINUED] Paxlovid 20 x 150 MG & 10 x 100MG tablet therapy pack tablet      • [DISCONTINUED] polyethylene glycol (MIRALAX) 17 GM/SCOOP powder Take 17 g by mouth.     • [DISCONTINUED] predniSONE (DELTASONE) 10 MG tablet TAKE 1 TABLET BY MOUTH EVERY DAY FOR 7 DAYS     • [DISCONTINUED] topiramate (TOPAMAX) 100 MG tablet      • [DISCONTINUED] topiramate (TOPAMAX) 50 MG tablet Take 1 tablet by mouth Daily.       No current facility-administered medications on file prior to visit.       Allergies   Allergen Reactions   • Demerol [Meperidine] Hallucinations   • Hydromorphone Hallucinations   • Lisinopril Unknown - Low Severity and Anaphylaxis     RESPIRATORY ARREST     • Morphine Hallucinations   • Diphenhydramine Other (See Comments) and Unknown - Low Severity     Cause pt to be hard to waken   • Diphenhydramine Hcl Unknown - Low Severity   • Lacosamide Unknown - Low Severity and Rash     HIVES   • Levetiracetam Unknown - Low Severity, Hives and Rash   • Oxycodone Unknown - Low Severity and Hallucinations     Other reaction(s): Agitation  Patient gets \"really mean\"  OK with hydrocodone   • Oxycodone Hcl Unknown - Low Severity and Unknown - High Severity   • Promethazine Hcl Other (See Comments)     Due to Brain injury   • Sotalol Hcl Unknown - Low Severity   • Sulfa Antibiotics Other (See Comments), Provider Review Needed and Unknown - Low Severity     Was instructed not to mix with methotrexate   • Trazodone Other (See Comments)     PAINFUL ERECTION   • Triazolam Itching     Past Medical History:   Diagnosis Date   • Anxiety    • Atherosclerosis of coronary artery 12/26/2020    Alejandra Wallace at [address]   • Atrial fibrillation " and flutter ([facility]) 12/26/2020   • BPH (benign prostatic hyperplasia) 12/26/2020   • Confused    • Depression    • Diabetes mellitus ([facility])    • Gastroesophageal reflux disease 12/26/2020   • Heart disease    • Hemorrhage     rectal.    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   • Traumatic brain injury 2010     Past Surgical History:   Procedure Laterality Date   • CARDIAC SURGERY  2006    triple bypass   • CARDIAC SURGERY     • CARDIOVERSION  11/2020   • KNEE SURGERY     • TOTAL HIP ARTHROPLASTY Bilateral    • TOTAL HIP ARTHROPLASTY REVISION Right 12/29/2020    Procedure: right posterior TOTAL HIP ARTHROPLASTY REVISION, pegboard lateral;  Surgeon: Damian Quijano II, MD;  Location: [facility];  Service: Orthopedics;  Laterality: Right;   • TOTAL KNEE ARTHROPLASTY Right 5/14/2021    Procedure: TOTAL KNEE ARTHROPLASTY;  Surgeon: Damian Quijano II, MD;  Location: [facility] OR;  Service: Orthopedics;  Laterality: Right;     Social History     Socioeconomic History   • Marital status:    Tobacco Use   • Smoking status: Never   • Smokeless tobacco: Never   Vaping Use   • Vaping Use: Never used   Substance and Sexual Activity   • Alcohol use: No   • Drug use: No   • Sexual activity: Defer     Family History   Problem Relation Age of Onset   • Stroke Mother    • Diabetes Father      Immunization History   Administered Date(s) Administered   • COVID-19 (MODERNA) 1st, 2nd, 3rd Dose Only 02/13/2021, 03/13/2021   • Fluad Quad 65+ 11/07/2022   • Tdap 02/21/2020       Objective   Physical Exam  Vitals and nursing note reviewed.   Constitutional:       Appearance: Normal appearance. He is normal weight.   HENT:       Head: Normocephalic.      Nose: Nose normal.      Mouth/Throat:      Mouth: Mucous membranes are moist.   Eyes:      Pupils: Pupils are equal, round, and reactive to light.   Cardiovascular:      Rate and Rhythm: Normal rate and regular rhythm.      Pulses: Normal pulses.      Heart sounds: Normal heart sounds.   Pulmonary:      Effort: Pulmonary effort is normal.      Breath sounds: Normal breath sounds.   Abdominal:      General: Bowel sounds are normal. There is no distension.      Palpations: Abdomen is soft.   Musculoskeletal:         General: Normal range of motion.      Cervical back: Normal range of motion and neck supple.   Skin:     General: Skin is warm.      Capillary Refill: Capillary refill takes less than 2 seconds.   Neurological:      General: No focal deficit present.      Mental Status: He is alert and oriented to person, place, and time.   Psychiatric:         Mood and Affect: Mood normal.         Behavior: Behavior normal.         Thought Content: Thought content normal.         Judgment: Judgment normal.         Vitals:    11/15/22 1310   BP: 125/56   Pulse: 60   Resp: 18   Temp: 97.3 °F (36.3 °C)   SpO2: 100%   Weight: 94.4 kg (208 lb 1.8 oz)   PainSc: 0-No pain     ECOG score: 0         ECOG: (0) Fully Active - Able to Carry On All Pre-disease Performance Without Restriction  Fall Risk Assessment was completed, and patient is at low risk for falls.  PHQ-9 Total Score: 0       The patient is  experiencing fatigue. Fatigue score: 6    PT/OT Functional Screening: PT fx screen: No needs identified  Speech Functional Screening: Speech fx screen: No needs identified  Rehab to be ordered: Rehab to be ordered: No needs identified        Result Review :Iron    The following data was reviewed by: SAMAN Johns on 11/15/2022:  Lab Results   Component Value Date    HGB 12.9 (L) 11/15/2022    HCT 38.8 11/15/2022    .6 (H) 11/15/2022     11/15/2022    WBC 5.85 11/15/2022     NEUTROABS 4.27 11/15/2022    LYMPHSABS 0.97 11/15/2022    MONOSABS 0.46 11/15/2022    EOSABS 0.08 11/15/2022    BASOSABS 0.03 11/15/2022     Lab Results   Component Value Date    GLUCOSE 102 (H) 05/16/2022    BUN 19 05/16/2022    CREATININE 1.42 (H) 05/16/2022     05/16/2022    K 4.3 05/16/2022     (H) 05/16/2022    CO2 21.3 (L) 05/16/2022    CALCIUM 9.5 05/16/2022    PROTEINTOT 6.8 05/16/2022    ALBUMIN 4.33 05/16/2022    BILITOT 0.5 05/16/2022    ALKPHOS 103 05/16/2022    AST 28 05/16/2022    ALT 25 05/16/2022          Assessment and Plan    Diagnoses and all orders for this visit:    1. Iron deficiency anemia, unspecified iron deficiency anemia type (Primary)  -     CBC & Differential; Future    Iron deficiency anemia and is currently on hold with the ferrous sulfate. Labs today show hemoglobin is quite stable and actually improved from previous labs in August. Hemoglobin is in the 11.9 range.     The rest of his labs are pending and will call with results once they have resulted.     Reviewed consult notes from hospital visit at [facility].   Follow up with cardiology visit at the end of the month. He is on Eliquis 2.5 mg QD now.           Patient Follow Up: 3 months with Dr. Diego with labs prior.     Patient was given instructions and counseling regarding his condition or for health maintenance advice. Please see specific information pulled into the AVS if appropriate.     Erendira Russo, APRN    11/15/2022

## 2022-11-18 LAB — METHYLMALONATE SERPL-SCNC: 428 NMOL/L (ref 0–378)

## 2023-02-15 ENCOUNTER — OFFICE VISIT (OUTPATIENT)
Dept: ONCOLOGY | Facility: HOSPITAL | Age: 78
End: 2023-02-15
Payer: MEDICARE

## 2023-02-15 ENCOUNTER — LAB (OUTPATIENT)
Dept: ONCOLOGY | Facility: HOSPITAL | Age: 78
End: 2023-02-15
Payer: MEDICARE

## 2023-02-15 VITALS
DIASTOLIC BLOOD PRESSURE: 67 MMHG | WEIGHT: 213.19 LBS | RESPIRATION RATE: 18 BRPM | TEMPERATURE: 97.3 F | SYSTOLIC BLOOD PRESSURE: 127 MMHG | HEART RATE: 61 BPM | BODY MASS INDEX: 27.37 KG/M2 | OXYGEN SATURATION: 99 %

## 2023-02-15 DIAGNOSIS — M06.9 RHEUMATOID ARTHRITIS, INVOLVING UNSPECIFIED SITE, UNSPECIFIED WHETHER RHEUMATOID FACTOR PRESENT: ICD-10-CM

## 2023-02-15 DIAGNOSIS — I82.403 RECURRENT ACUTE DEEP VEIN THROMBOSIS (DVT) OF BOTH LOWER EXTREMITIES: Primary | ICD-10-CM

## 2023-02-15 DIAGNOSIS — D64.9 ANEMIA, UNSPECIFIED TYPE: ICD-10-CM

## 2023-02-15 DIAGNOSIS — D50.9 IRON DEFICIENCY ANEMIA, UNSPECIFIED IRON DEFICIENCY ANEMIA TYPE: ICD-10-CM

## 2023-02-15 DIAGNOSIS — I73.9 PERIPHERAL VASCULAR DISEASE: ICD-10-CM

## 2023-02-15 PROBLEM — I25.10 CAD (CORONARY ARTERY DISEASE): Status: ACTIVE | Noted: 2023-02-15

## 2023-02-15 PROBLEM — R07.9 CHEST PAIN: Status: ACTIVE | Noted: 2023-02-15

## 2023-02-15 PROBLEM — K92.2 GI BLEEDING: Status: ACTIVE | Noted: 2023-02-15

## 2023-02-15 PROBLEM — Z87.39 H/O: OSTEOARTHRITIS: Status: ACTIVE | Noted: 2023-02-15

## 2023-02-15 LAB
BASOPHILS # BLD AUTO: 0.02 10*3/MM3 (ref 0–0.2)
BASOPHILS NFR BLD AUTO: 0.4 % (ref 0–1.5)
DEPRECATED RDW RBC AUTO: 51.9 FL (ref 37–54)
EOSINOPHIL # BLD AUTO: 0.06 10*3/MM3 (ref 0–0.4)
EOSINOPHIL NFR BLD AUTO: 1.3 % (ref 0.3–6.2)
ERYTHROCYTE [DISTWIDTH] IN BLOOD BY AUTOMATED COUNT: 14.3 % (ref 12.3–15.4)
HCT VFR BLD AUTO: 38.4 % (ref 37.5–51)
HGB BLD-MCNC: 12.8 G/DL (ref 13–17.7)
IMM GRANULOCYTES # BLD AUTO: 0.01 10*3/MM3 (ref 0–0.05)
IMM GRANULOCYTES NFR BLD AUTO: 0.2 % (ref 0–0.5)
LYMPHOCYTES # BLD AUTO: 1.05 10*3/MM3 (ref 0.7–3.1)
LYMPHOCYTES NFR BLD AUTO: 22.6 % (ref 19.6–45.3)
MCH RBC QN AUTO: 33.5 PG (ref 26.6–33)
MCHC RBC AUTO-ENTMCNC: 33.3 G/DL (ref 31.5–35.7)
MCV RBC AUTO: 100.5 FL (ref 79–97)
MONOCYTES # BLD AUTO: 0.26 10*3/MM3 (ref 0.1–0.9)
MONOCYTES NFR BLD AUTO: 5.6 % (ref 5–12)
NEUTROPHILS NFR BLD AUTO: 3.24 10*3/MM3 (ref 1.7–7)
NEUTROPHILS NFR BLD AUTO: 69.9 % (ref 42.7–76)
PLATELET # BLD AUTO: 188 10*3/MM3 (ref 140–450)
PMV BLD AUTO: 9.7 FL (ref 6–12)
RBC # BLD AUTO: 3.82 10*6/MM3 (ref 4.14–5.8)
WBC NRBC COR # BLD: 4.64 10*3/MM3 (ref 3.4–10.8)

## 2023-02-15 PROCEDURE — 36415 COLL VENOUS BLD VENIPUNCTURE: CPT

## 2023-02-15 PROCEDURE — 99214 OFFICE O/P EST MOD 30 MIN: CPT | Performed by: INTERNAL MEDICINE

## 2023-02-15 PROCEDURE — G0463 HOSPITAL OUTPT CLINIC VISIT: HCPCS | Performed by: INTERNAL MEDICINE

## 2023-02-15 PROCEDURE — 85025 COMPLETE CBC W/AUTO DIFF WBC: CPT

## 2023-02-15 NOTE — PROGRESS NOTES
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  He developed blood clots in both upper legs and was on anti-coagulation for sometime after and then taken off.    - He has been on blood thinners for various reasons over the years such as atrial fibrillation and developed serious GI bleeding.    - past colonoscopies revealed significant internal hemorrhoids (per pt)   - Bilateral lower extremity venous duplex on 6/13/21: chronic DVT in the Right common femoral, proximal femoral and popliteal as well as left common femoral.   - Pt reports falling/stepping into a small ditch near his house and developing left leg swelling after.    -Left lower extremity Doppler ultrasound on 9/28/2021: Thrombus in the left common femoral, distal superficial femoral, and popliteal veins with some residual flow  -restared on Eliquis 5mg BID on 9/28/21.  -decreased to 2.5mg BID after significant hematochezia with the higher dose.           HPI  Patient comes in today with his wife to discuss the plan for his anemia and history of DVTs.  His wife reports that he recently had a new diagnosis of lower extremity DVTs.  He restarted back on Eliquis at 5 mg twice a day.  Soon after that he began to have significant GI bleeding again.  She talked with his primary care provider who agreed to decrease the dose to 2.5 mg twice a day.  The bleeding has since abated.  His hemoglobin today is stable at 12.8.  I reviewed his previous labs which show that his iron levels were normal and B12/folate levels were also adequate.        Review of Systems   Constitutional: Negative for appetite change, diaphoresis, fatigue, fever, unexpected weight gain and unexpected weight  loss.   HENT: Negative for hearing loss, mouth sores, sore throat, swollen glands, trouble swallowing and voice change.    Eyes: Negative for blurred vision.   Respiratory: Negative for cough, shortness of breath and wheezing.    Cardiovascular: Negative for chest pain and palpitations.   Gastrointestinal: Negative for abdominal pain, blood in stool, constipation, diarrhea, nausea and vomiting.   Endocrine: Negative for cold intolerance and heat intolerance.   Genitourinary: Negative for difficulty urinating, dysuria, frequency, hematuria and urinary incontinence.   Musculoskeletal: Negative for arthralgias, back pain and myalgias.   Skin: Negative for rash, skin lesions and wound.   Neurological: Negative for dizziness, seizures, weakness, numbness and headache.   Hematological: Does not bruise/bleed easily.   Psychiatric/Behavioral: Negative for depressed mood. The patient is not nervous/anxious.    All other systems reviewed and are negative.      Past Medical History:   Diagnosis Date   • Anxiety    • Atherosclerosis of coronary artery 12/26/2020    Alejandra Wallace at [address]   • Atrial fibrillation and flutter ([facility]) 12/26/2020   • BPH (benign prostatic hyperplasia) 12/26/2020   • Confused    • Depression    • Diabetes mellitus ([facility])    • Gastroesophageal reflux disease 12/26/2020   • Heart disease    • Hemorrhage     rectal.    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   • Traumatic brain injury 2010     Past Surgical History:   Procedure Laterality Date   • CARDIAC SURGERY  2006    triple bypass   • CARDIAC SURGERY     • CARDIOVERSION  11/2020   • KNEE SURGERY     • TOTAL HIP ARTHROPLASTY Bilateral    • TOTAL HIP  ARTHROPLASTY REVISION Right 12/29/2020    Procedure: right posterior TOTAL HIP ARTHROPLASTY REVISION, pegboard lateral;  Surgeon: Damian Quijano II, MD;  Location: [facility] MAIN OR;  Service: Orthopedics;  Laterality: Right;   • TOTAL KNEE ARTHROPLASTY Right 5/14/2021    Procedure: TOTAL KNEE ARTHROPLASTY;  Surgeon: Damian Quijano II, MD;  Location: [facility] MAIN OR;  Service: Orthopedics;  Laterality: Right;     Social History     Socioeconomic History   • Marital status:    Tobacco Use   • Smoking status: Never   • Smokeless tobacco: Never   Vaping Use   • Vaping Use: Never used   Substance and Sexual Activity   • Alcohol use: No   • Drug use: No   • Sexual activity: Defer     Family History   Problem Relation Age of Onset   • Stroke Mother    • Diabetes Father        Objective   Physical Exam  General: Alert, cooperative, no acute distress  Eyes: Anicteric sclera, PERRLA  Respiratory: normal respiratory effort  Cardiovascular: no lower extremity edema  Skin: Normal tone, no rash, no lesions  Psychiatric: Appropriate affect, intact judgment  Neurologic: No focal sensory or motor deficits, normal cognition   Musculoskeletal: Normal muscle strength and tone  Extremities: No clubbing, cyanosis, or deformities    Vitals:    02/15/23 1307   BP: 127/67   Pulse: 61   Resp: 18   Temp: 97.3 °F (36.3 °C)   SpO2: 99%   Weight: 96.7 kg (213 lb 3 oz)   PainSc: 0-No pain     ECOG score: 0         PHQ-9 Total Score:         Result Review :   The following data was reviewed by: Beverley Diego MD PhD on 02/15/2023:  Lab Results   Component Value Date    HGB 12.8 (L) 02/15/2023    HCT 38.4 02/15/2023    .5 (H) 02/15/2023     02/15/2023    WBC 4.64 02/15/2023    NEUTROABS 3.24 02/15/2023    LYMPHSABS 1.05 02/15/2023    MONOSABS 0.26 02/15/2023    EOSABS 0.06 02/15/2023    BASOSABS 0.02 02/15/2023     Lab Results   Component Value Date    GLUCOSE 102 (H) 05/16/2022    BUN 19 05/16/2022     CREATININE 1.42 (H) 05/16/2022     05/16/2022    K 4.3 05/16/2022     (H) 05/16/2022    CO2 21.3 (L) 05/16/2022    CALCIUM 9.5 05/16/2022    PROTEINTOT 6.8 05/16/2022    ALBUMIN 4.33 05/16/2022    BILITOT 0.5 05/16/2022    ALKPHOS 103 05/16/2022    AST 28 05/16/2022    ALT 25 05/16/2022          Assessment and Plan    Diagnoses and all orders for this visit:    1. 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 Anemia, unspecified type    3. Peripheral vascular disease (HCC)    4. Rheumatoid arthritis, involving unspecified site, unspecified whether rheumatoid factor present (HCC)        Recurrent LE DVTs: He is back on 2.5mg of Eliquis BID and does not have significant GI bleeding at this time.  He has a complicated balance between his history of clotting and bleeding. He may only be able to tolerate 3 months of anti-coagulation before the risk of bleeding outweighs the benefit of anti-coagulation again.  If he did not have bleeding complications, I would recommend life long anti-coagulation but he has failed this treatment repeatedly so it is expected again.  I believe the reason for this DVTs is multifactorial having to do with age, PVD and underlying RA which is pro-inflammatory.  I offered to do a hypercoag work-up but it would not change his management and is unlikely to yield any fruitful knowledge.  I will follow up with him again in 3 months with repeat CBC and iron studies.      Anemia: Hgb is stable.  The patient should have repeat CBC and iron studies if his bleeding reoccurs.         Patient was given instructions and counseling regarding his condition or for health maintenance advice. Please see specific information pulled into the AVS if appropriate.

## 2023-02-17 ENCOUNTER — HOSPITAL ENCOUNTER (OUTPATIENT)
Dept: GENERAL RADIOLOGY | Facility: HOSPITAL | Age: 78
Discharge: HOME | End: 2023-02-17
Payer: MEDICARE

## 2023-02-17 ENCOUNTER — LAB (OUTPATIENT)
Dept: LAB | Facility: HOSPITAL | Age: 78
End: 2023-02-17
Payer: MEDICARE

## 2023-02-17 ENCOUNTER — TRANSCRIBE ORDERS (OUTPATIENT)
Dept: ADMINISTRATIVE | Facility: HOSPITAL | Age: 78
End: 2023-02-17
Payer: MEDICARE

## 2023-02-17 DIAGNOSIS — E11.9 DIABETES MELLITUS WITHOUT COMPLICATION: ICD-10-CM

## 2023-02-17 DIAGNOSIS — E78.5 HYPERLIPIDEMIA, UNSPECIFIED HYPERLIPIDEMIA TYPE: ICD-10-CM

## 2023-02-17 DIAGNOSIS — I48.19 PERSISTENT ATRIAL FIBRILLATION: ICD-10-CM

## 2023-02-17 DIAGNOSIS — I10 ESSENTIAL HYPERTENSION, MALIGNANT: ICD-10-CM

## 2023-02-17 DIAGNOSIS — I50.32 CHRONIC DIASTOLIC HEART FAILURE: ICD-10-CM

## 2023-02-17 DIAGNOSIS — D64.9 ANEMIA, UNSPECIFIED TYPE: ICD-10-CM

## 2023-02-17 DIAGNOSIS — I25.10 DISEASE OF CARDIOVASCULAR SYSTEM: ICD-10-CM

## 2023-02-17 DIAGNOSIS — I48.19 PERSISTENT ATRIAL FIBRILLATION: Primary | ICD-10-CM

## 2023-02-17 LAB
ALBUMIN SERPL-MCNC: 4.7 G/DL (ref 3.5–5.2)
ALBUMIN UR-MCNC: <1.2 MG/DL
ALP SERPL-CCNC: 92 U/L (ref 39–117)
ALT SERPL W P-5'-P-CCNC: 19 U/L (ref 1–41)
ANION GAP SERPL CALCULATED.3IONS-SCNC: 6.7 MMOL/L (ref 5–15)
AST SERPL-CCNC: 26 U/L (ref 1–40)
BASOPHILS # BLD AUTO: 0.03 10*3/MM3 (ref 0–0.2)
BASOPHILS NFR BLD AUTO: 0.5 % (ref 0–1.5)
BILIRUB CONJ SERPL-MCNC: <0.2 MG/DL (ref 0–0.3)
BILIRUB INDIRECT SERPL-MCNC: NORMAL MG/DL
BILIRUB SERPL-MCNC: 0.6 MG/DL (ref 0–1.2)
BUN SERPL-MCNC: 18 MG/DL (ref 8–23)
BUN/CREAT SERPL: 12.3 (ref 7–25)
CALCIUM SPEC-SCNC: 9.6 MG/DL (ref 8.6–10.5)
CHLORIDE SERPL-SCNC: 104 MMOL/L (ref 98–107)
CHOLEST SERPL-MCNC: 195 MG/DL (ref 0–200)
CO2 SERPL-SCNC: 25.3 MMOL/L (ref 22–29)
CREAT SERPL-MCNC: 1.46 MG/DL (ref 0.76–1.27)
CREAT UR-MCNC: 85.4 MG/DL
CRP SERPL-MCNC: 0.09 MG/DL (ref 0.01–0.5)
DEPRECATED RDW RBC AUTO: 51.9 FL (ref 37–54)
EGFRCR SERPLBLD CKD-EPI 2021: 49.2 ML/MIN/1.73
EOSINOPHIL # BLD AUTO: 0.08 10*3/MM3 (ref 0–0.4)
EOSINOPHIL NFR BLD AUTO: 1.2 % (ref 0.3–6.2)
ERYTHROCYTE [DISTWIDTH] IN BLOOD BY AUTOMATED COUNT: 14.4 % (ref 12.3–15.4)
FERRITIN SERPL-MCNC: 98.89 NG/ML (ref 30–400)
GLUCOSE SERPL-MCNC: 103 MG/DL (ref 65–99)
HBA1C MFR BLD: 5.9 % (ref 4.8–5.6)
HCT VFR BLD AUTO: 40.1 % (ref 37.5–51)
HDLC SERPL-MCNC: 57 MG/DL (ref 40–60)
HGB BLD-MCNC: 13.3 G/DL (ref 13–17.7)
IMM GRANULOCYTES # BLD AUTO: 0.02 10*3/MM3 (ref 0–0.05)
IMM GRANULOCYTES NFR BLD AUTO: 0.3 % (ref 0–0.5)
IRON 24H UR-MRATE: 49 MCG/DL (ref 59–158)
IRON SATN MFR SERPL: 12 % (ref 20–50)
LDLC SERPL CALC-MCNC: 124 MG/DL (ref 0–100)
LDLC/HDLC SERPL: 2.16 {RATIO}
LYMPHOCYTES # BLD AUTO: 1.38 10*3/MM3 (ref 0.7–3.1)
LYMPHOCYTES NFR BLD AUTO: 21.4 % (ref 19.6–45.3)
MCH RBC QN AUTO: 33.5 PG (ref 26.6–33)
MCHC RBC AUTO-ENTMCNC: 33.2 G/DL (ref 31.5–35.7)
MCV RBC AUTO: 101 FL (ref 79–97)
MICROALBUMIN/CREAT UR: NORMAL MG/G{CREAT}
MONOCYTES # BLD AUTO: 0.38 10*3/MM3 (ref 0.1–0.9)
MONOCYTES NFR BLD AUTO: 5.9 % (ref 5–12)
NEUTROPHILS NFR BLD AUTO: 4.55 10*3/MM3 (ref 1.7–7)
NEUTROPHILS NFR BLD AUTO: 70.7 % (ref 42.7–76)
NRBC BLD AUTO-RTO: 0 /100 WBC (ref 0–0.2)
NT-PROBNP SERPL-MCNC: 593 PG/ML (ref 0–1800)
PLATELET # BLD AUTO: 211 10*3/MM3 (ref 140–450)
PMV BLD AUTO: 10.4 FL (ref 6–12)
POTASSIUM SERPL-SCNC: 4.2 MMOL/L (ref 3.5–5.2)
PROT SERPL-MCNC: 7.4 G/DL (ref 6–8.5)
RBC # BLD AUTO: 3.97 10*6/MM3 (ref 4.14–5.8)
SODIUM SERPL-SCNC: 136 MMOL/L (ref 136–145)
TIBC SERPL-MCNC: 407 MCG/DL (ref 298–536)
TRANSFERRIN SERPL-MCNC: 273 MG/DL (ref 200–360)
TRIGL SERPL-MCNC: 75 MG/DL (ref 0–150)
TSH SERPL DL<=0.05 MIU/L-ACNC: 3.68 UIU/ML (ref 0.27–4.2)
VLDLC SERPL-MCNC: 14 MG/DL (ref 5–40)
WBC NRBC COR # BLD: 6.44 10*3/MM3 (ref 3.4–10.8)

## 2023-02-17 PROCEDURE — 84443 ASSAY THYROID STIM HORMONE: CPT

## 2023-02-17 PROCEDURE — 80076 HEPATIC FUNCTION PANEL: CPT

## 2023-02-17 PROCEDURE — 36415 COLL VENOUS BLD VENIPUNCTURE: CPT

## 2023-02-17 PROCEDURE — 86141 C-REACTIVE PROTEIN HS: CPT

## 2023-02-17 PROCEDURE — 83540 ASSAY OF IRON: CPT

## 2023-02-17 PROCEDURE — 82728 ASSAY OF FERRITIN: CPT

## 2023-02-17 PROCEDURE — 80061 LIPID PANEL: CPT

## 2023-02-17 PROCEDURE — 82043 UR ALBUMIN QUANTITATIVE: CPT

## 2023-02-17 PROCEDURE — 84466 ASSAY OF TRANSFERRIN: CPT

## 2023-02-17 PROCEDURE — 82570 ASSAY OF URINE CREATININE: CPT

## 2023-02-17 PROCEDURE — 71046 X-RAY EXAM CHEST 2 VIEWS: CPT

## 2023-02-17 PROCEDURE — 85025 COMPLETE CBC W/AUTO DIFF WBC: CPT

## 2023-02-17 PROCEDURE — 83880 ASSAY OF NATRIURETIC PEPTIDE: CPT

## 2023-02-17 PROCEDURE — 80048 BASIC METABOLIC PNL TOTAL CA: CPT

## 2023-02-17 PROCEDURE — 83036 HEMOGLOBIN GLYCOSYLATED A1C: CPT

## 2023-05-16 ENCOUNTER — OFFICE VISIT (OUTPATIENT)
Dept: NEUROLOGY | Facility: CLINIC | Age: 78
End: 2023-05-16
Payer: MEDICARE

## 2023-05-16 VITALS
BODY MASS INDEX: 28.11 KG/M2 | HEIGHT: 74 IN | WEIGHT: 219 LBS | SYSTOLIC BLOOD PRESSURE: 126 MMHG | DIASTOLIC BLOOD PRESSURE: 66 MMHG

## 2023-05-16 DIAGNOSIS — R51.9 NONINTRACTABLE HEADACHE, UNSPECIFIED CHRONICITY PATTERN, UNSPECIFIED HEADACHE TYPE: Primary | ICD-10-CM

## 2023-05-16 PROCEDURE — 1160F RVW MEDS BY RX/DR IN RCRD: CPT | Performed by: STUDENT IN AN ORGANIZED HEALTH CARE EDUCATION/TRAINING PROGRAM

## 2023-05-16 PROCEDURE — 1159F MED LIST DOCD IN RCRD: CPT | Performed by: STUDENT IN AN ORGANIZED HEALTH CARE EDUCATION/TRAINING PROGRAM

## 2023-05-16 PROCEDURE — 99205 OFFICE O/P NEW HI 60 MIN: CPT | Performed by: STUDENT IN AN ORGANIZED HEALTH CARE EDUCATION/TRAINING PROGRAM

## 2023-05-16 PROCEDURE — 3078F DIAST BP <80 MM HG: CPT | Performed by: STUDENT IN AN ORGANIZED HEALTH CARE EDUCATION/TRAINING PROGRAM

## 2023-05-16 PROCEDURE — 3074F SYST BP LT 130 MM HG: CPT | Performed by: STUDENT IN AN ORGANIZED HEALTH CARE EDUCATION/TRAINING PROGRAM

## 2023-05-16 RX ORDER — TOPIRAMATE 50 MG/1
50 TABLET, FILM COATED ORAL 2 TIMES DAILY
Qty: 180 TABLET | Refills: 3 | Status: SHIPPED | OUTPATIENT
Start: 2023-05-16

## 2023-05-16 NOTE — LETTER
May 16, 2023     SAMAN Arevalo  [address] [place] [address]    Patient: Jason Arceo   YOB: 1945   Date of Visit: 5/16/2023       Dear SAMAN Garner:    Thank you for referring Jason Arceo to me for evaluation. Below are the relevant portions of my assessment and plan of care.    If you have questions, please do not hesitate to call me. I look forward to following Jason along with you.         Sincerely,        Stephen Shetty MD        CC: Stephen Alvarez MD  05/16/23 1731  Signed  Chief Complaint   Patient presents with   • Headache       Patient ID: Jason Arceo is a 78 y.o. male.    HPI:    The following portions of the patient's history were reviewed and updated as appropriate: allergies, current medications, past family history, past medical history, past social history, past surgical history and problem list.    Review of Systems   Constitutional: Negative for activity change, appetite change, chills, diaphoresis, fatigue, fever and unexpected weight change.   HENT: Negative for congestion, dental problem, drooling, ear discharge, ear pain, facial swelling, hearing loss, mouth sores, nosebleeds, postnasal drip, rhinorrhea, sinus pressure, sinus pain, sneezing, sore throat, tinnitus, trouble swallowing and voice change.    Eyes: Negative for photophobia, pain, discharge, redness, itching and visual disturbance.   Respiratory: Negative for apnea, cough, choking, chest tightness, shortness of breath, wheezing and stridor.    Cardiovascular: Negative for chest pain, palpitations and leg swelling.   Gastrointestinal: Negative for abdominal distention, abdominal pain, anal bleeding, blood in stool, constipation, diarrhea, nausea, rectal pain and vomiting.   Endocrine: Negative for cold intolerance, heat intolerance, polydipsia, polyphagia and polyuria.   Genitourinary: Negative for decreased urine volume, difficulty urinating, dysuria, enuresis,  flank pain, frequency, genital sores, hematuria, penile discharge, penile pain, penile swelling, scrotal swelling, testicular pain and urgency.   Musculoskeletal: Negative for arthralgias, back pain, gait problem, joint swelling, myalgias, neck pain and neck stiffness.   Skin: Negative for color change, pallor, rash and wound.   Allergic/Immunologic: Negative for environmental allergies, food allergies and immunocompromised state.   Neurological: Positive for headaches. Negative for dizziness, tremors, seizures, syncope, facial asymmetry, speech difficulty, weakness, light-headedness and numbness.   Hematological: Negative for adenopathy. Does not bruise/bleed easily.   Psychiatric/Behavioral: Positive for agitation and confusion. Negative for behavioral problems, decreased concentration, dysphoric mood, hallucinations, self-injury, sleep disturbance and suicidal ideas. The patient is nervous/anxious. The patient is not hyperactive.         Mr. Arceo is a 78-year-old male with history of traumatic brain injury, diabetes, neuropathy, single sz in 2010 on TPM 25 BID,  Atrial fibrillation on Eliquis (recently stopped/paused due to GI bleed) and amiodarone who presents to neurology clinic for evaluation of headache referred by Kalli Hammond. He has a bitemporal headache which can extend to his frontal headaches. 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 Seizure free since October 2010.   Thinks it may be due to sinus issues.    Headaches  Onset 6 months  Location bitemporal and can be bifrontal  Frequency every few days  Duration 2-3 hours  Aura none  ASSOCIATED SYMPTOMS: no nausea, Light/sound sensitive. Physical activity does not make it worse.  Alleviating factors include sleep.  Exacerbating factors include none.  Pain character - pressure  CARDIAC HISTORY: Has ischemic cardiomyopathy. Has had bypass, CHF. Has had atrial fibrillation. Has right sided chest pain  occasionally. Two heart attacks, no strokes. Does not smoke.  Prior medications none  Sometimes takes a few Tylenol and it helps.  Days per month, how many days are severe 16 or more/0   Working in the sun and heat/cold can be a trigger.    Different headaches in teenage years which resolved. Head really sensitive after accident. Never had craniotomy. Can get confused and forgets names sometimes.  Continues to work.            Vitals:    23 1551   BP: 126/66       Neurologic Exam     Mental Status   Speech: speech is normal   Level of consciousness: alert    Cranial Nerves     CN II   Visual fields full to confrontation.     CN III, IV, VI   Pupils are equal, round, and reactive to light.  Extraocular motions are normal.     CN V   Facial sensation intact.     CN VII   Facial expression full, symmetric.     CN VIII   Hearing: intact    CN IX, X   Palate: symmetric    CN XI   Right trapezius strength: normal  Left trapezius strength: normal    CN XII   Tongue: not atrophic  Fasciculations: absent  Tongue deviation: none  No visual extinction     Motor Exam   Muscle bulk: normal    Strength   Right deltoid: 5/5  Left deltoid: 5/5  Right biceps: 5/5  Left biceps: 5/5  Right triceps: 5/5  Left triceps: 5/5  Right iliopsoas: 5/5  Left iliopsoas: 5/5  Right quadriceps: 5/5  Left quadriceps: 5/5  Right hamstrin/5  Left hamstrin/5  Right anterior tibial: 5/5  Left anterior tibial: 5/5  Right gastroc: 5/5  Left gastroc: 5/5Grip 5 out of 5 bilaterally     Sensory Exam   Right arm light touch: normal  Left arm light touch: normal  Right leg light touch: normal  Left leg light touch: normal    Gait, Coordination, and Reflexes     Coordination   Finger to nose coordination: normal  Heel to shin coordination: normal    Reflexes   Right brachioradialis: 2+  Left brachioradialis: 2+  Right patellar: 2+  Left patellar: 2+  Right achilles: 2+  Left achilles: 2+Difficulty relaxing biceps       Physical Exam  Eyes:       Extraocular Movements: EOM normal.      Pupils: Pupils are equal, round, and reactive to light.   Neurological:      Coordination: Finger-Nose-Finger Test and Heel to Shin Test normal.      Deep Tendon Reflexes:      Reflex Scores:       Brachioradialis reflexes are 2+ on the right side and 2+ on the left side.       Patellar reflexes are 2+ on the right side and 2+ on the left side.       Achilles reflexes are 2+ on the right side and 2+ on the left side.  Psychiatric:         Speech: Speech normal.         Procedures    Assessment/Plan:    Assessment and plan  The patient has headaches which appear nonmigrainous in nature.  This could potentially be a late complication due to his traumatic brain injury, versus new etiology of headache after age 65.  Sinus headaches are a possibility though the location is not completely typical for sinus headache as he primarily notes they are bitemporal.    We discussed aspects of headache hygiene including consistent bedtimes and sleeping at least 7 hours a night, regular physical exercise of 20 minutes a day or more, and drinking at least 8 glasses of water a day unless he is told that he is on fluid restriction by cardiology and then otherwise would maintain fluid restriction per cardiology.  The patient and his wife state that they are not on fluid restriction at this time.  We discussed the idea of medication rebound headache.  He takes Tylenol perhaps once a month so this is not of high concern.  To try to decrease the frequency of his headaches we are increasing topiramate from 25 mg twice daily to 50 mg twice daily.  He will need to continue to be on topiramate at least 25 mg twice daily even if it is not effective for his headache prevention as it seems to prevent seizures.  Agree with him seeing ENT to evaluate for sinus headache causes.  I am ordering an MRI brain with and without contrast to look for intracranial causes of headache that would  such as  tumor, I also requested the radiologist to comment on appearance of the sinuses if they are captured in the MRI.  If topiramate 50 mg twice daily is not effective we may start riboflavin 400 mg daily in the future.  He is not a good candidate for triptan medications given his history of heart attacks, and the headaches do not appear migrainous at this time.  He may continue taking Tylenol for these headaches.  From a neurology standpoint my role will be to rule out dangerous causes of headache via MRI.  I can also refill his topiramate in the future as needed at either the seizure prevention dose or headache prevention dose.  I spent at least 60 minutes caring for this patient on this date of service. This time includes time spent by me in the following activities as necessary: preparing for the visit, reviewing tests, medical records and previous visits, obtaining and/or reviewing a separately obtained history, performing a medically appropriate exam and/or evaluation, counseling and educating the patient, and/or communicating with other healthcare professionals, documenting information in the medical record, independently interpreting results and communicating that information with the patient, and developing a medically appropriate treatment plan with consideration of other conditions, medications, and treatments.'    Diagnoses and all orders for this visit:    1. Nonintractable headache, unspecified chronicity pattern, unspecified headache type (Primary)  -     MRI Brain With & Without Contrast; Future    Other orders  -     topiramate (TOPAMAX) 50 MG tablet; Take 1 tablet by mouth 2 (Two) Times a Day. For seizure and headache prevention  Dispense: 180 tablet; Refill: 3          Stephen Shetty MD

## 2023-05-16 NOTE — PROGRESS NOTES
Chief Complaint   Patient presents with   • Headache       Patient ID: Jason Arceo is a 78 y.o. male.    HPI:    The following portions of the patient's history were reviewed and updated as appropriate: allergies, current medications, past family history, past medical history, past social history, past surgical history and problem list.    Review of Systems   Constitutional: Negative for activity change, appetite change, chills, diaphoresis, fatigue, fever and unexpected weight change.   HENT: Negative for congestion, dental problem, drooling, ear discharge, ear pain, facial swelling, hearing loss, mouth sores, nosebleeds, postnasal drip, rhinorrhea, sinus pressure, sinus pain, sneezing, sore throat, tinnitus, trouble swallowing and voice change.    Eyes: Negative for photophobia, pain, discharge, redness, itching and visual disturbance.   Respiratory: Negative for apnea, cough, choking, chest tightness, shortness of breath, wheezing and stridor.    Cardiovascular: Negative for chest pain, palpitations and leg swelling.   Gastrointestinal: Negative for abdominal distention, abdominal pain, anal bleeding, blood in stool, constipation, diarrhea, nausea, rectal pain and vomiting.   Endocrine: Negative for cold intolerance, heat intolerance, polydipsia, polyphagia and polyuria.   Genitourinary: Negative for decreased urine volume, difficulty urinating, dysuria, enuresis, flank pain, frequency, genital sores, hematuria, penile discharge, penile pain, penile swelling, scrotal swelling, testicular pain and urgency.   Musculoskeletal: Negative for arthralgias, back pain, gait problem, joint swelling, myalgias, neck pain and neck stiffness.   Skin: Negative for color change, pallor, rash and wound.   Allergic/Immunologic: Negative for environmental allergies, food allergies and immunocompromised state.   Neurological: Positive for headaches. Negative for dizziness, tremors, seizures, syncope, facial asymmetry, speech  difficulty, weakness, light-headedness and numbness.   Hematological: Negative for adenopathy. Does not bruise/bleed easily.   Psychiatric/Behavioral: Positive for agitation and confusion. Negative for behavioral problems, decreased concentration, dysphoric mood, hallucinations, self-injury, sleep disturbance and suicidal ideas. The patient is nervous/anxious. The patient is not hyperactive.         Mr. Arceo is a 78-year-old male with history of traumatic brain injury, diabetes, neuropathy, single sz in 2010 on TPM 25 BID,  Atrial fibrillation on Eliquis (recently stopped/paused due to GI bleed) and amiodarone who presents to neurology clinic for evaluation of headache referred by Kalli Hammond. He has a bitemporal headache which can extend to his frontal headaches. 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 Seizure free since October 2010.   Thinks it may be due to sinus issues.    Headaches  Onset 6 months  Location bitemporal and can be bifrontal  Frequency every few days  Duration 2-3 hours  Aura none  ASSOCIATED SYMPTOMS: no nausea, Light/sound sensitive. Physical activity does not make it worse.  Alleviating factors include sleep.  Exacerbating factors include none.  Pain character - pressure  CARDIAC HISTORY: Has ischemic cardiomyopathy. Has had bypass, CHF. Has had atrial fibrillation. Has right sided chest pain occasionally. Two heart attacks, no strokes. Does not smoke.  Prior medications none  Sometimes takes a few Tylenol and it helps.  Days per month, how many days are severe 16 or more/0   Working in the sun and heat/cold can be a trigger.    Different headaches in teenage years which resolved. Head really sensitive after accident. Never had craniotomy. Can get confused and forgets names sometimes.  Continues to work.            Vitals:    05/16/23 1551   BP: 126/66       Neurologic Exam     Mental Status   Speech: speech is normal   Level of consciousness:  alert    Cranial Nerves     CN II   Visual fields full to confrontation.     CN III, IV, VI   Pupils are equal, round, and reactive to light.  Extraocular motions are normal.     CN V   Facial sensation intact.     CN VII   Facial expression full, symmetric.     CN VIII   Hearing: intact    CN IX, X   Palate: symmetric    CN XI   Right trapezius strength: normal  Left trapezius strength: normal    CN XII   Tongue: not atrophic  Fasciculations: absent  Tongue deviation: none  No visual extinction     Motor Exam   Muscle bulk: normal    Strength   Right deltoid: 5/5  Left deltoid: 5/5  Right biceps: 5/5  Left biceps: 5/5  Right triceps: 5/5  Left triceps: 5/5  Right iliopsoas: 5/5  Left iliopsoas: 5/5  Right quadriceps: 5/5  Left quadriceps: 5/5  Right hamstrin/5  Left hamstrin/5  Right anterior tibial: 5/5  Left anterior tibial: 5/5  Right gastroc: 5/5  Left gastroc: 5/5Grip 5 out of 5 bilaterally     Sensory Exam   Right arm light touch: normal  Left arm light touch: normal  Right leg light touch: normal  Left leg light touch: normal    Gait, Coordination, and Reflexes     Coordination   Finger to nose coordination: normal  Heel to shin coordination: normal    Reflexes   Right brachioradialis: 2+  Left brachioradialis: 2+  Right patellar: 2+  Left patellar: 2+  Right achilles: 2+  Left achilles: 2+Difficulty relaxing biceps       Physical Exam  Eyes:      Extraocular Movements: EOM normal.      Pupils: Pupils are equal, round, and reactive to light.   Neurological:      Coordination: Finger-Nose-Finger Test and Heel to Shin Test normal.      Deep Tendon Reflexes:      Reflex Scores:       Brachioradialis reflexes are 2+ on the right side and 2+ on the left side.       Patellar reflexes are 2+ on the right side and 2+ on the left side.       Achilles reflexes are 2+ on the right side and 2+ on the left side.  Psychiatric:         Speech: Speech normal.         Procedures    Assessment/Plan:    Assessment and  plan  The patient has headaches which appear nonmigrainous in nature.  This could potentially be a late complication due to his traumatic brain injury, versus new etiology of headache after age 65.  Sinus headaches are a possibility though the location is not completely typical for sinus headache as he primarily notes they are bitemporal.    We discussed aspects of headache hygiene including consistent bedtimes and sleeping at least 7 hours a night, regular physical exercise of 20 minutes a day or more, and drinking at least 8 glasses of water a day unless he is told that he is on fluid restriction by cardiology and then otherwise would maintain fluid restriction per cardiology.  The patient and his wife state that they are not on fluid restriction at this time.  We discussed the idea of medication rebound headache.  He takes Tylenol perhaps once a month so this is not of high concern.  To try to decrease the frequency of his headaches we are increasing topiramate from 25 mg twice daily to 50 mg twice daily.  He will need to continue to be on topiramate at least 25 mg twice daily even if it is not effective for his headache prevention as it seems to prevent seizures.  Agree with him seeing ENT to evaluate for sinus headache causes.  I am ordering an MRI brain with and without contrast to look for intracranial causes of headache that would  such as tumor, I also requested the radiologist to comment on appearance of the sinuses if they are captured in the MRI.  If topiramate 50 mg twice daily is not effective we may start riboflavin 400 mg daily in the future.  He is not a good candidate for triptan medications given his history of heart attacks, and the headaches do not appear migrainous at this time.  He may continue taking Tylenol for these headaches.  From a neurology standpoint my role will be to rule out dangerous causes of headache via MRI.  I can also refill his topiramate in the future as  needed at either the seizure prevention dose or headache prevention dose.  I spent at least 60 minutes caring for this patient on this date of service. This time includes time spent by me in the following activities as necessary: preparing for the visit, reviewing tests, medical records and previous visits, obtaining and/or reviewing a separately obtained history, performing a medically appropriate exam and/or evaluation, counseling and educating the patient, and/or communicating with other healthcare professionals, documenting information in the medical record, independently interpreting results and communicating that information with the patient, and developing a medically appropriate treatment plan with consideration of other conditions, medications, and treatments.'     Diagnoses and all orders for this visit:    1. Nonintractable headache, unspecified chronicity pattern, unspecified headache type (Primary)  -     MRI Brain With & Without Contrast; Future    Other orders  -     topiramate (TOPAMAX) 50 MG tablet; Take 1 tablet by mouth 2 (Two) Times a Day. For seizure and headache prevention  Dispense: 180 tablet; Refill: 3           Stephen Shetty MD

## 2023-05-17 ENCOUNTER — TELEPHONE (OUTPATIENT)
Dept: NEUROLOGY | Facility: CLINIC | Age: 78
End: 2023-05-17
Payer: MEDICARE

## 2023-05-17 ENCOUNTER — OFFICE VISIT (OUTPATIENT)
Dept: ONCOLOGY | Facility: HOSPITAL | Age: 78
End: 2023-05-17
Payer: MEDICARE

## 2023-05-17 ENCOUNTER — LAB (OUTPATIENT)
Dept: ONCOLOGY | Facility: HOSPITAL | Age: 78
End: 2023-05-17
Payer: MEDICARE

## 2023-05-17 VITALS
SYSTOLIC BLOOD PRESSURE: 120 MMHG | HEART RATE: 57 BPM | TEMPERATURE: 97.3 F | RESPIRATION RATE: 18 BRPM | BODY MASS INDEX: 28.04 KG/M2 | WEIGHT: 218.48 LBS | OXYGEN SATURATION: 99 % | HEIGHT: 74 IN | DIASTOLIC BLOOD PRESSURE: 57 MMHG

## 2023-05-17 DIAGNOSIS — D50.9 IRON DEFICIENCY ANEMIA, UNSPECIFIED IRON DEFICIENCY ANEMIA TYPE: ICD-10-CM

## 2023-05-17 DIAGNOSIS — K62.5 GASTROINTESTINAL HEMORRHAGE ASSOCIATED WITH ANORECTAL SOURCE: ICD-10-CM

## 2023-05-17 DIAGNOSIS — I82.513 CHRONIC DEEP VEIN THROMBOSIS (DVT) OF FEMORAL VEIN OF BOTH LOWER EXTREMITIES: ICD-10-CM

## 2023-05-17 DIAGNOSIS — D50.9 IRON DEFICIENCY ANEMIA, UNSPECIFIED IRON DEFICIENCY ANEMIA TYPE: Primary | ICD-10-CM

## 2023-05-17 LAB
BASOPHILS # BLD AUTO: 0.04 10*3/MM3 (ref 0–0.2)
BASOPHILS NFR BLD AUTO: 0.7 % (ref 0–1.5)
DEPRECATED RDW RBC AUTO: 54 FL (ref 37–54)
EOSINOPHIL # BLD AUTO: 0.06 10*3/MM3 (ref 0–0.4)
EOSINOPHIL NFR BLD AUTO: 1.1 % (ref 0.3–6.2)
ERYTHROCYTE [DISTWIDTH] IN BLOOD BY AUTOMATED COUNT: 14.9 % (ref 12.3–15.4)
FERRITIN SERPL-MCNC: 106.6 NG/ML (ref 30–400)
HCT VFR BLD AUTO: 36.5 % (ref 37.5–51)
HGB BLD-MCNC: 12.4 G/DL (ref 13–17.7)
IMM GRANULOCYTES # BLD AUTO: 0.01 10*3/MM3 (ref 0–0.05)
IMM GRANULOCYTES NFR BLD AUTO: 0.2 % (ref 0–0.5)
IRON 24H UR-MRATE: 89 MCG/DL (ref 59–158)
IRON SATN MFR SERPL: 23 % (ref 20–50)
LYMPHOCYTES # BLD AUTO: 1.04 10*3/MM3 (ref 0.7–3.1)
LYMPHOCYTES NFR BLD AUTO: 18.5 % (ref 19.6–45.3)
MCH RBC QN AUTO: 33.8 PG (ref 26.6–33)
MCHC RBC AUTO-ENTMCNC: 34 G/DL (ref 31.5–35.7)
MCV RBC AUTO: 99.5 FL (ref 79–97)
MONOCYTES # BLD AUTO: 0.23 10*3/MM3 (ref 0.1–0.9)
MONOCYTES NFR BLD AUTO: 4.1 % (ref 5–12)
NEUTROPHILS NFR BLD AUTO: 4.23 10*3/MM3 (ref 1.7–7)
NEUTROPHILS NFR BLD AUTO: 75.4 % (ref 42.7–76)
PLATELET # BLD AUTO: 167 10*3/MM3 (ref 140–450)
PMV BLD AUTO: 9.9 FL (ref 6–12)
RBC # BLD AUTO: 3.67 10*6/MM3 (ref 4.14–5.8)
TIBC SERPL-MCNC: 380 MCG/DL (ref 298–536)
TRANSFERRIN SERPL-MCNC: 255 MG/DL (ref 200–360)
WBC NRBC COR # BLD: 5.61 10*3/MM3 (ref 3.4–10.8)

## 2023-05-17 PROCEDURE — 82728 ASSAY OF FERRITIN: CPT

## 2023-05-17 PROCEDURE — 84466 ASSAY OF TRANSFERRIN: CPT

## 2023-05-17 PROCEDURE — 3078F DIAST BP <80 MM HG: CPT | Performed by: INTERNAL MEDICINE

## 2023-05-17 PROCEDURE — 3074F SYST BP LT 130 MM HG: CPT | Performed by: INTERNAL MEDICINE

## 2023-05-17 PROCEDURE — 85025 COMPLETE CBC W/AUTO DIFF WBC: CPT

## 2023-05-17 PROCEDURE — G0463 HOSPITAL OUTPT CLINIC VISIT: HCPCS | Performed by: INTERNAL MEDICINE

## 2023-05-17 PROCEDURE — 83540 ASSAY OF IRON: CPT

## 2023-05-17 PROCEDURE — 1126F AMNT PAIN NOTED NONE PRSNT: CPT | Performed by: INTERNAL MEDICINE

## 2023-05-17 PROCEDURE — 99214 OFFICE O/P EST MOD 30 MIN: CPT | Performed by: INTERNAL MEDICINE

## 2023-05-17 PROCEDURE — 36415 COLL VENOUS BLD VENIPUNCTURE: CPT

## 2023-05-17 NOTE — PROGRESS NOTES
Patient  Jason Arceo    Location  [facility] HEMATOLOGY & ONCOLOGY    Chief Complaint  Anemia    Referring Provider: Ravi Guevara MD  PCP: Ravi Guevara MD    Subjective     {Problem List  Visit Diagnosis   Encounters  Notes  Medications  Labs  Result Review Imaging  Media :23}     Oncology/Hematology History Overview Note     LE DVT:  - Pt had a TBI in 2010 and was in a coma for two weeks.  He developed blood clots in both upper legs and was on anti-coagulation for sometime after and then taken off.    - He has been on blood thinners for various reasons over the years such as atrial fibrillation and developed serious GI bleeding.    - past colonoscopies revealed significant internal hemorrhoids (per pt)   - Bilateral lower extremity venous duplex on 6/13/21: chronic DVT in the Right common femoral, proximal femoral and popliteal as well as left common femoral.   - Pt reports falling/stepping into a small ditch near his house and developing left leg swelling after.    -Left lower extremity Doppler ultrasound on 9/28/2021: Thrombus in the left common femoral, distal superficial femoral, and popliteal veins with some residual flow  -restared on Eliquis 5mg BID on 9/28/21.  -decreased to 2.5mg BID after significant hematochezia with the higher dose.           History of Present Illness  ***    Review of Systems   Constitutional: Positive for fatigue. Negative for appetite change, diaphoresis, fever, unexpected weight gain and unexpected weight loss.   HENT: Negative for hearing loss, mouth sores, sore throat, swollen glands, trouble swallowing and voice change.    Eyes: Negative for blurred vision.   Respiratory: Negative for cough, shortness of breath and wheezing.    Cardiovascular: Negative for chest pain and palpitations.   Gastrointestinal: Positive for anal bleeding. Negative for abdominal pain, blood in stool, constipation, diarrhea, nausea and vomiting.   Endocrine: Negative  for cold intolerance and heat intolerance.   Genitourinary: Negative for difficulty urinating, dysuria, frequency, hematuria and urinary incontinence.   Musculoskeletal: Negative for arthralgias, back pain and myalgias.   Skin: Negative for rash, skin lesions and wound.   Neurological: Negative for dizziness, seizures, weakness, numbness and headache.   Hematological: Does not bruise/bleed easily.   Psychiatric/Behavioral: Negative for depressed mood. The patient is not nervous/anxious.    All other systems reviewed and are negative.      Past Medical History:   Diagnosis Date   • Anxiety    • Atherosclerosis of coronary artery 12/26/2020    [facility] Wallace at [address]   • Atrial fibrillation and flutter 12/26/2020   • BPH (benign prostatic hyperplasia) 12/26/2020   • Confused    • CTS (carpal tunnel syndrome) ?   • Depression    • Diabetes mellitus    • Difficulty walking Years ago    Due to bad hips and knee.   • Gastroesophageal reflux disease 12/26/2020   • Headache, tension-type 6 months ago.   • Heart disease    • Hemorrhage     rectal.    • History of cardioversion 11/17/2020   • HL (hearing loss) Years   • Hyperlipidemia ? Nia 4-5 years ago.   • Hypertension 08/20/2014   • Memory loss After head njury.   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   • CORONARY ARTERY BYPASS GRAFT  On list   • KNEE SURGERY     • TOTAL HIP ARTHROPLASTY Bilateral    • TOTAL HIP ARTHROPLASTY REVISION Right 12/29/2020    Procedure: right posterior TOTAL HIP ARTHROPLASTY REVISION,  pegboard lateral;  Surgeon: Damian Quijano II, MD;  Location: [facility] MAIN OR;  Service: Orthopedics;  Laterality: Right;   • TOTAL KNEE ARTHROPLASTY Right 05/14/2021    Procedure: TOTAL KNEE ARTHROPLASTY;  Surgeon: Damian Quijano II, MD;  Location: [facility] MA[facility] OR;  Service: Orthopedics;  Laterality: Right;     Social History     Socioeconomic History   • Marital status:    Tobacco Use   • Smoking status: Never   • Smokeless tobacco: Never   Vaping Use   • Vaping Use: Never used   Substance and Sexual Activity   • Alcohol use: Never   • Drug use: Never   • Sexual activity: Not Currently     Partners: Female     Family History   Problem Relation Age of Onset   • Stroke Mother    • Diabetes Father        Objective   Physical Exam  ***    There were no vitals filed for this visit.            PHQ-9 Total Score:         Result Review :{Labs  Result Review  Imaging  Med Tab  Media  Procedures :23}   The following data was reviewed by: Katharine Nieves MA on 05/17/2023:  Lab Results   Component Value Date    HGB 13.3 02/17/2023    HCT 40.1 02/17/2023    .0 (H) 02/17/2023     02/17/2023    WBC 6.44 02/17/2023    NEUTROABS 4.55 02/17/2023    LYMPHSABS 1.38 02/17/2023    MONOSABS 0.38 02/17/2023    EOSABS 0.08 02/17/2023    BASOSABS 0.03 02/17/2023     Lab Results   Component Value Date    GLUCOSE 103 (H) 02/17/2023    BUN 18 02/17/2023    CREATININE 1.46 (H) 02/17/2023     02/17/2023    K 4.2 02/17/2023     02/17/2023    CO2 25.3 02/17/2023    CALCIUM 9.6 02/17/2023    PROTEINTOT 7.4 02/17/2023    ALBUMIN 4.7 02/17/2023    BILITOT 0.6 02/17/2023    ALKPHOS 92 02/17/2023    AST 26 02/17/2023    ALT 19 02/17/2023     Lab Results   Component Value Date    IRON 49 (L) 02/17/2023    LABIRON 12 (L) 02/17/2023    TRANSFERRIN 273 02/17/2023    TIBC 407 02/17/2023     Lab Results   Component Value Date    FERRITIN 98.89 02/17/2023    BKHTNTLC00 362 11/15/2022    FOLATE >20.00  11/15/2022     No results found for: PSA, CEA, AFP, ,        Assessment and Plan {CC Problem List  Visit Diagnosis   ROS  Review (Popup)  Health Maintenance  Quality  BestPractice  Medications  SmartSets  SnapShot Encounters  Media :23}   Diagnoses and all orders for this visit:    1. Iron deficiency anemia, unspecified iron deficiency anemia type (Primary)  -     CBC & Differential; Future  -     Ferritin; Future  -     Iron Profile; Future          Patient was given instructions and counseling regarding his condition or for health maintenance advice. Please see specific information pulled into the AVS if appropriate.     Katharine Nieves MA    5/17/2023       PhD    6/19/2023

## 2023-05-25 ENCOUNTER — PATIENT ROUNDING (BHMG ONLY) (OUTPATIENT)
Dept: NEUROLOGY | Facility: CLINIC | Age: 78
End: 2023-05-25
Payer: MEDICARE

## 2023-05-30 ENCOUNTER — TRANSCRIBE ORDERS (OUTPATIENT)
Dept: ADMINISTRATIVE | Facility: HOSPITAL | Age: 78
End: 2023-05-30

## 2023-05-30 DIAGNOSIS — J32.2 CHRONIC ETHMOIDAL SINUSITIS: Primary | ICD-10-CM

## 2023-06-07 ENCOUNTER — HOSPITAL ENCOUNTER (OUTPATIENT)
Dept: CT IMAGING | Facility: HOSPITAL | Age: 78
Discharge: HOME | End: 2023-06-07
Admitting: OTOLARYNGOLOGY
Payer: MEDICARE

## 2023-06-07 DIAGNOSIS — J32.2 CHRONIC ETHMOIDAL SINUSITIS: ICD-10-CM

## 2023-06-07 PROCEDURE — 70486 CT MAXILLOFACIAL W/O DYE: CPT

## 2023-07-10 ENCOUNTER — TELEPHONE (OUTPATIENT)
Dept: ONCOLOGY | Facility: HOSPITAL | Age: 78
End: 2023-07-10

## 2023-07-10 NOTE — TELEPHONE ENCOUNTER
Caller: Celia Arceo    Relationship to patient: Emergency Contact    Best call back number: [phone]    Type of visit: F/U 2 (COULD NOT SCHEDULE WITHIN TIMEFRAME)    Requested date: CALL TO R/S     If rescheduling, when is the original appointment: 7/19/2023

## 2023-08-31 ENCOUNTER — HOSPITAL ENCOUNTER (OUTPATIENT)
Dept: MRI IMAGING | Facility: HOSPITAL | Age: 78
Discharge: HOME | End: 2023-08-31
Admitting: STUDENT IN AN ORGANIZED HEALTH CARE EDUCATION/TRAINING PROGRAM
Payer: MEDICARE

## 2023-08-31 DIAGNOSIS — R51.9 NONINTRACTABLE HEADACHE, UNSPECIFIED CHRONICITY PATTERN, UNSPECIFIED HEADACHE TYPE: ICD-10-CM

## 2023-08-31 LAB
CREAT BLDA-MCNC: 1.4 MG/DL
EGFRCR SERPLBLD CKD-EPI 2021: 51.4 ML/MIN/1.73

## 2023-08-31 PROCEDURE — A9577 INJ MULTIHANCE: HCPCS | Performed by: STUDENT IN AN ORGANIZED HEALTH CARE EDUCATION/TRAINING PROGRAM

## 2023-08-31 PROCEDURE — 70553 MRI BRAIN STEM W/O & W/DYE: CPT

## 2023-08-31 PROCEDURE — 0 GADOBENATE DIMEGLUMINE 529 MG/ML SOLUTION: Performed by: STUDENT IN AN ORGANIZED HEALTH CARE EDUCATION/TRAINING PROGRAM

## 2023-08-31 PROCEDURE — 82565 ASSAY OF CREATININE: CPT

## 2023-08-31 RX ADMIN — GADOBENATE DIMEGLUMINE 20 ML: 529 INJECTION, SOLUTION INTRAVENOUS at 11:01

## 2023-09-01 ENCOUNTER — TELEPHONE (OUTPATIENT)
Dept: ONCOLOGY | Facility: HOSPITAL | Age: 78
End: 2023-09-01
Payer: MEDICARE

## 2023-09-01 ENCOUNTER — OFFICE VISIT (OUTPATIENT)
Dept: ONCOLOGY | Facility: HOSPITAL | Age: 78
End: 2023-09-01
Payer: MEDICARE

## 2023-09-01 VITALS
SYSTOLIC BLOOD PRESSURE: 127 MMHG | RESPIRATION RATE: 18 BRPM | OXYGEN SATURATION: 97 % | DIASTOLIC BLOOD PRESSURE: 65 MMHG | HEIGHT: 74 IN | BODY MASS INDEX: 28.35 KG/M2 | WEIGHT: 220.9 LBS | TEMPERATURE: 97.1 F | HEART RATE: 39 BPM

## 2023-09-01 DIAGNOSIS — D50.9 IRON DEFICIENCY ANEMIA, UNSPECIFIED IRON DEFICIENCY ANEMIA TYPE: Primary | ICD-10-CM

## 2023-09-01 PROCEDURE — G0463 HOSPITAL OUTPT CLINIC VISIT: HCPCS | Performed by: INTERNAL MEDICINE

## 2023-09-01 RX ORDER — CEPHALEXIN 500 MG/1
CAPSULE ORAL
COMMUNITY
Start: 2023-09-01

## 2023-09-01 RX ORDER — POTASSIUM CHLORIDE 750 MG/1
TABLET, EXTENDED RELEASE ORAL
COMMUNITY
Start: 2023-08-23

## 2023-09-01 NOTE — TELEPHONE ENCOUNTER
At checkout patient and his daughter stated that they will call us back to make an appt for a 3 month follow up as the mother is currently sick and they want to wait and see what happens.

## 2023-09-20 ENCOUNTER — TRANSCRIBE ORDERS (OUTPATIENT)
Dept: ADMINISTRATIVE | Facility: HOSPITAL | Age: 78
End: 2023-09-20
Payer: MEDICARE

## 2023-09-20 DIAGNOSIS — R19.09 INGUINAL BULGE: Primary | ICD-10-CM

## 2023-09-26 ENCOUNTER — HOSPITAL ENCOUNTER (OUTPATIENT)
Dept: ULTRASOUND IMAGING | Facility: HOSPITAL | Age: 78
Discharge: HOME | End: 2023-09-26
Admitting: NURSE PRACTITIONER
Payer: MEDICARE

## 2023-09-26 DIAGNOSIS — R19.09 INGUINAL BULGE: ICD-10-CM

## 2023-09-26 PROCEDURE — 76705 ECHO EXAM OF ABDOMEN: CPT

## 2023-12-01 ENCOUNTER — TELEPHONE (OUTPATIENT)
Dept: ONCOLOGY | Facility: HOSPITAL | Age: 78
End: 2023-12-01
Payer: MEDICARE

## (undated) DEVICE — GLV SURG SENSICARE PI PF LF 7 GRN STRL

## (undated) DEVICE — SOL IRR NACL 0.9PCT 3000ML

## (undated) DEVICE — 3M™ IOBAN™ 2 ANTIMICROBIAL INCISE DRAPE 6650EZ: Brand: IOBAN™ 2

## (undated) DEVICE — ZIP 24 SURGICAL SKIN CLOSURE DEVICE, PSA: Brand: ZIP 24 SURGICAL SKIN CLOSURE DEVICE

## (undated) DEVICE — PK TOTL KN 50

## (undated) DEVICE — DUAL CUT SAGITTAL BLADE

## (undated) DEVICE — DRSNG BARR INSUL KN POLAR/CARE S/ADHS LG

## (undated) DEVICE — 450 ML BOTTLE OF 0.05% CHLORHEXIDINE GLUCONATE IN 99.95% STERILE WATER FOR IRRIGATION, USP AND APPLICATOR.: Brand: IRRISEPT ANTIMICROBIAL WOUND LAVAGE

## (undated) DEVICE — CEMENT MIXING SYSTEM WITH FEMORAL BREAKWAY NOZZLE: Brand: REVOLUTION

## (undated) DEVICE — SOL NACL 0.9PCT 1000ML

## (undated) DEVICE — Device

## (undated) DEVICE — PK TOTL HIP 50

## (undated) DEVICE — ZIPPERED TOGA, 2X LARGE: Brand: FLYTE

## (undated) DEVICE — GLV SURG SENSICARE PI ORTHO PF SZ7 LF STRL

## (undated) DEVICE — PENCL EVAC ULTRAVAC SMOKE W/BLD

## (undated) DEVICE — KT SURG TURNOVER 050

## (undated) DEVICE — CONTAINER,SPECIMEN,OR STERILE,4OZ: Brand: MEDLINE

## (undated) DEVICE — IRRIGATOR BULB ASEPTO 60CC STRL

## (undated) DEVICE — HANDPIECE SET WITH COAXIAL MULTI-ORIFICE TIP AND SUCTION TUBE: Brand: INTERPULSE

## (undated) DEVICE — GOWN,REINFRCE,POLY,SIRUS,BREATH SLV,XXLG: Brand: MEDLINE

## (undated) DEVICE — SYR LUERLOK 20CC BX/50

## (undated) DEVICE — ACCORD 2.0MM COBALT CHROME CABLE                                    WITH CLAMP
Type: IMPLANTABLE DEVICE | Site: HIP | Status: NON-FUNCTIONAL
Brand: ACCORD
Removed: 2020-12-29

## (undated) DEVICE — PAD COLD/THRP KN POLAR/CARE WRAP/ON XL

## (undated) DEVICE — STERILE PATIENT PROTECTIVE PAD FOR IMP® KNEE POSITIONERS & COHESIVE WRAP (10 / CASE): Brand: DE MAYO KNEE POSITIONER®

## (undated) DEVICE — SOL ISO/ALC RUB 70PCT 4OZ

## (undated) DEVICE — DRAPE,U/ SHT,SPLIT,PLAS,STERIL: Brand: MEDLINE

## (undated) DEVICE — APPL CHLORAPREP HI/LITE 26ML ORNG

## (undated) DEVICE — DRSNG BURN ACTICOAT FLEX 7 1X24IN

## (undated) DEVICE — GLV SURG DERMASSURE GRN LF PF 8.5

## (undated) DEVICE — NEEDLE, QUINCKE, 18GX3.5": Brand: MEDLINE

## (undated) DEVICE — HIP PILLOW, ABDUCTION: Brand: DEROYAL

## (undated) DEVICE — SUT VIC 0 CT1 36IN J946H

## (undated) DEVICE — PICO 7 10CM X 30CM: Brand: PICO™ 7

## (undated) DEVICE — UNDERGLV SURG BIOGEL INDICAT PI SZ8.5 BLU

## (undated) DEVICE — GLV SURG SENSICARE PI ORTHO SZ8.5 LF STRL

## (undated) DEVICE — CUFF TOURNI 1BLADDER 1PRT 30IN STRL

## (undated) DEVICE — NEEDLE, QUINCKE, 20GX3.5": Brand: MEDLINE

## (undated) DEVICE — TBG IRRI TUR Y/TYP NONVENT 98IN LF

## (undated) DEVICE — SYS COLD/THRP POLAR/CARE/CUBE